# Patient Record
Sex: FEMALE | Race: WHITE | NOT HISPANIC OR LATINO | Employment: OTHER | URBAN - NONMETROPOLITAN AREA
[De-identification: names, ages, dates, MRNs, and addresses within clinical notes are randomized per-mention and may not be internally consistent; named-entity substitution may affect disease eponyms.]

---

## 2017-03-21 ENCOUNTER — OFFICE VISIT (OUTPATIENT)
Dept: CARDIOLOGY | Facility: CLINIC | Age: 62
End: 2017-03-21

## 2017-03-21 VITALS
BODY MASS INDEX: 33.97 KG/M2 | HEART RATE: 94 BPM | OXYGEN SATURATION: 97 % | HEIGHT: 62 IN | SYSTOLIC BLOOD PRESSURE: 142 MMHG | WEIGHT: 184.6 LBS | DIASTOLIC BLOOD PRESSURE: 91 MMHG

## 2017-03-21 DIAGNOSIS — R09.89 BILATERAL CAROTID BRUITS: ICD-10-CM

## 2017-03-21 DIAGNOSIS — I10 ESSENTIAL HYPERTENSION: ICD-10-CM

## 2017-03-21 DIAGNOSIS — R00.2 PALPITATIONS: ICD-10-CM

## 2017-03-21 DIAGNOSIS — I25.10 CAD IN NATIVE ARTERY: ICD-10-CM

## 2017-03-21 DIAGNOSIS — I95.1 ORTHOSTATIC HYPOTENSION: ICD-10-CM

## 2017-03-21 DIAGNOSIS — J44.9 CHRONIC OBSTRUCTIVE PULMONARY DISEASE, UNSPECIFIED COPD TYPE (HCC): ICD-10-CM

## 2017-03-21 DIAGNOSIS — I73.9 PERIPHERAL VASCULAR DISEASE (HCC): ICD-10-CM

## 2017-03-21 DIAGNOSIS — E78.5 DYSLIPIDEMIA: Primary | ICD-10-CM

## 2017-03-21 DIAGNOSIS — E78.00 HYPERCHOLESTEROLEMIA: ICD-10-CM

## 2017-03-21 DIAGNOSIS — Z00.00 HEALTHCARE MAINTENANCE: ICD-10-CM

## 2017-03-21 DIAGNOSIS — I73.9 CLAUDICATION (HCC): ICD-10-CM

## 2017-03-21 DIAGNOSIS — K21.9 GASTROESOPHAGEAL REFLUX DISEASE, ESOPHAGITIS PRESENCE NOT SPECIFIED: ICD-10-CM

## 2017-03-21 PROCEDURE — 99213 OFFICE O/P EST LOW 20 MIN: CPT | Performed by: NURSE PRACTITIONER

## 2017-03-21 RX ORDER — ROSUVASTATIN CALCIUM 40 MG/1
40 TABLET, COATED ORAL DAILY
Qty: 90 TABLET | Refills: 3 | Status: SHIPPED | OUTPATIENT
Start: 2017-03-21 | End: 2017-06-19 | Stop reason: SDUPTHER

## 2017-03-21 NOTE — PATIENT INSTRUCTIONS
Sinus Tachycardia  Sinus tachycardia is a kind of fast heartbeat. In sinus tachycardia, the heart beats more than 100 times a minute. Sinus tachycardia starts in a part of the heart called the sinus node.  Sinus tachycardia may be harmless, or it may be a sign of a serious condition.  CAUSES  This condition may be caused by:  · Exercise or exertion.  · A fever.  · Pain.  · An injury.  · Loss of body fluids (dehydration).  · Severe bleeding (hemorrhage).  · Anxiety and stress.  · Certain substances, including:    Alcohol.    Caffeine.    Tobacco products.    Diet pills.    Illegal drugs.  · Medical conditions including:    Heart disease.    An infection.    An overactive thyroid (hyperthyroidism).    A lack of red blood cells (anemia).  SYMPTOMS  Symptoms of this condition include:  · A feeling that the heart is beating quickly (palpitation).  · Suddenly noticing your heartbeat (cardiac awareness).  · Dizziness.  · Tiredness (fatigue).  · Shortness of breath.  · Chest pain.  · Nausea.  · Fainting.  DIAGNOSIS  This condition is diagnosed with a physical exam and tests, such as:  · Blood tests.  · An electrocardiogram (ECG). This test measures the electrical activity of the heart.  · Holter monitoring. For this test, you wear a device that records your heartbeat for one or more days.  You may be referred to a heart specialist (cardiologist).  TREATMENT  Treatment for this condition depends on the cause or underlying condition. Treatment may involve:  · Treating the underlying condition.  · Taking new medicines or changing your current medicines as told by your health care provider.  · Making changes to your diet or lifestyle.  · Practicing relaxation methods.  HOME CARE INSTRUCTIONS  · Rest.  · Drink enough fluids to keep your urine clear or pale yellow.  · Do not use any tobacco products, such as cigarettes, chewing tobacco, and e-cigarettes. If you need help quitting, ask your health care provider.  · Do not use  illegal drugs, such as cocaine.  · Avoid:    Caffeine.    Alcohol.    Stimulants such as over-the-counter diet pills or pills that help you to stay awake.    Situations that cause anxiety or stress.  · Take over-the-counter and prescription medicines only as told by your health care provider.  · Keep all follow-up visits as told by your health care provider. This is important.  · Learn relaxation methods, like deep breathing, to help you when you get stressed or anxious.  SEEK MEDICAL CARE IF:  · You have a fever.  · You have vomiting or diarrhea that keeps happening (is persistent).  SEEK IMMEDIATE MEDICAL CARE IF:  · You have pain in your chest, upper arms, jaw, or neck.  · You become weak or dizzy.  · You feel faint.  · You have palpitations that do not go away.     This information is not intended to replace advice given to you by your health care provider. Make sure you discuss any questions you have with your health care provider.     Document Released: 01/25/2006 Document Revised: 01/13/2017 Document Reviewed: 07/01/2016  [employer] Interactive Patient Education ©2016 [employer]  Peripheral Vascular Disease  Peripheral vascular disease (PVD) is a disease of the blood vessels that are not part of your heart and brain. A simple term for PVD is poor circulation. In most cases, PVD narrows the blood vessels that carry blood from your heart to the rest of your body. This can result in a decreased supply of blood to your arms, legs, and internal organs, like your stomach or kidneys. However, it most often affects a person's lower legs and feet.  There are two types of PVD.  · Organic PVD. This is the more common type. It is caused by damage to the structure of blood vessels.  · Functional PVD. This is caused by conditions that make blood vessels contract and tighten (spasm).  Without treatment, PVD tends to get worse over time.  PVD can also lead to acute ischemic limb. This is when an arm or limb suddenly has  trouble getting enough blood. This is a medical emergency.  CAUSES  Each type of PVD has many different causes. The most common cause of PVD is buildup of a fatty material (plaque) inside of your arteries (atherosclerosis). Small amounts of plaque can break off from the walls of the blood vessels and become lodged in a smaller artery. This blocks blood flow and can cause acute ischemic limb.  Other common causes of PVD include:  · Blood clots that form inside of blood vessels.  · Injuries to blood vessels.  · Diseases that cause inflammation of blood vessels or cause blood vessel spasms.  · Health behaviors and health history that increase your risk of developing PVD.  RISK FACTORS   You may have a greater risk of PVD if you:  · Have a family history of PVD.  · Have certain medical conditions, including:    High cholesterol.    Diabetes.    High blood pressure (hypertension).    Coronary heart disease.    Past problems with blood clots.    Past injury, such as burns or a broken bone. These may have damaged blood vessels in your limbs.    Buerger disease. This is caused by inflamed blood vessels in your hands and feet.    Some forms of arthritis.    Rare birth defects that affect the arteries in your legs.  · Use tobacco.  · Do not get enough exercise.  · Are obese.  · Are age 50 or older.  SIGNS AND SYMPTOMS   PVD may cause many different symptoms. Your symptoms depend on what part of your body is not getting enough blood. Some common signs and symptoms include:  · Cramps in your lower legs. This may be a symptom of poor leg circulation (claudication).  · Pain and weakness in your legs while you are physically active that goes away when you rest (intermittent claudication).  · Leg pain when at rest.  · Leg numbness, tingling, or weakness.  · Coldness in a leg or foot, especially when compared with the other leg.  · Skin or hair changes. These can include:    Hair loss.    Shiny skin.    Pale or bluish skin.    Thick  toenails.  · Inability to get or maintain an erection (erectile dysfunction).  People with PVD are more prone to developing ulcers and sores on their toes, feet, or legs. These may take longer than normal to heal.  DIAGNOSIS  Your health care provider may diagnose PVD from your signs and symptoms. The health care provider will also do a physical exam. You may have tests to find out what is causing your PVD and determine its severity. Tests may include:  · Blood pressure recordings from your arms and legs and measurements of the strength of your pulses (pulse volume recordings).  · Imaging studies using sound waves to take pictures of the blood flow through your blood vessels (Doppler ultrasound).  · Injecting a dye into your blood vessels before having imaging studies using:    X-rays (angiogram or arteriogram).    Computer-generated X-rays (CT angiogram).    A powerful electromagnetic field and a computer (magnetic resonance angiogram or MRA).  TREATMENT  Treatment for PVD depends on the cause of your condition and the severity of your symptoms. It also depends on your age. Underlying causes need to be treated and controlled. These include long-lasting (chronic) conditions, such as diabetes, high cholesterol, and high blood pressure. You may need to first try making lifestyle changes and taking medicines. Surgery may be needed if these do not work.  Lifestyle changes may include:  · Quitting smoking.  · Exercising regularly.  · Following a low-fat, low-cholesterol diet.  Medicines may include:  · Blood thinners to prevent blood clots.  · Medicines to improve blood flow.  · Medicines to improve your blood cholesterol levels.  Surgical procedures may include:  · A procedure that uses an inflated balloon to open a blocked artery and improve blood flow (angioplasty).  · A procedure to put in a tube (stent) to keep a blocked artery open (stent implant).  · Surgery to reroute blood flow around a blocked artery  (peripheral bypass surgery).  · Surgery to remove dead tissue from an infected wound on the affected limb.  · Amputation. This is surgical removal of the affected limb. This may be necessary in cases of acute ischemic limb that are not improved through medical or surgical treatments.  HOME CARE INSTRUCTIONS  · Take medicines only as directed by your health care provider.  · Do not use any tobacco products, including cigarettes, chewing tobacco, or electronic cigarettes.  If you need help quitting, ask your health care provider.  · Lose weight if you are overweight, and maintain a healthy weight as directed by your health care provider.  · Eat a diet that is low in fat and cholesterol. If you need help, ask your health care provider.  · Exercise regularly. Ask your health care provider to suggest some good activities for you.  · Use compression stockings or other mechanical devices as directed by your health care provider.  · Take good care of your feet.    Wear comfortable shoes that fit well.    Check your feet often for any cuts or sores.  SEEK MEDICAL CARE IF:  · You have cramps in your legs while walking.  · You have leg pain when you are at rest.  · You have coldness in a leg or foot.  · Your skin changes.  · You have erectile dysfunction.  · You have cuts or sores on your feet that are not healing.  SEEK IMMEDIATE MEDICAL CARE IF:  · Your arm or leg turns cold and blue.  · Your arms or legs become red, warm, swollen, painful, or numb.  · You have chest pain or trouble breathing.  · You suddenly have weakness in your face, arm, or leg.  · You become very confused or lose the ability to speak.  · You suddenly have a very bad headache or lose your vision.     This information is not intended to replace advice given to you by your health care provider. Make sure you discuss any questions you have with your health care provider.     Document Released: 01/25/2006 Document Revised: 01/08/2016 Document Reviewed:  05/28/2015  [employer] Interactive Patient Education ©2016 [employer]

## 2017-03-21 NOTE — PROGRESS NOTES
Subjective   Barby Sue is a 61 y.o. female     Chief Complaint   Patient presents with   • Follow-up     presents as a follow up       HPI    Problem List:  1. Coronary artery disease  1.1 Nonobstructive per catheterization, March 2012.  1.2 Follow up stress August 2015 demonstrated chest wall attenuation but no sign of ischemia, preserved ER  1.3 Stress Test 12/14/16 - no ischemia; low risk  2. PVD with history of aortobifemoral bypass 2014  2.1 BLE Arterial U/S 12/14/16 - < or equal to 50% right common femoral artery; high-grade stenosis left posterior tibial artery 50-75% stenosis  3. Chronic hypotension   4. Hyperlipidemia   5. Carotid Artery Disease   5.1 Carotid Artery U/S 12/14/16 - 50-75% right external carotid; 16-49% KENNEDY and LICA; antegrade flow both vertebral arteries   6. Echo 12/14/16 - mild LVH; EF > 65%; DD I; trace MR; physiological TR; mild AK    Patient is a 61-year-old female who presents today for a follow-up.  She denies any chest pain or pressure.  She notices her heart racing and palpitations a lot. She had some dizziness when she had a sinus infection that went into bronchitis.  She denies any presyncope, syncope, orthopnea or PND.  She will get edema in the evening as she will notice sock marks.  She says she has pain in her legs when she is on them.  She says after having her bypass it got better, but then it has come back.  She quit smoking at that time and has not picked it back up.  She use to smoke 1 1/2 - 2 PPD for 40 yrs. She has to stop and rest with any activity that she does.  She is currently using a nebulizer due to her recent bronchitis.     We went over stress, echo, carotid and BLE arterial U/S.     Current Outpatient Prescriptions   Medication Sig Dispense Refill   • aspirin 81 MG EC tablet Take 1 tablet by mouth daily.     • Biotin 1000 MCG tablet Take 1 tablet by mouth daily.     • BLACK COHOSH PO Take 1,000 mg by mouth daily.     • budesonide-formoterol (SYMBICORT)  160-4.5 MCG/ACT inhaler Inhale 2 puffs 2 (two) times a day. Rinse mouth after use.     • CloNIDine (CATAPRES) 0.1 MG tablet Take 1 tablet by mouth 2 (Two) Times a Day. 180 tablet 3   • cyclobenzaprine (FLEXERIL) 10 MG tablet Take 1 tablet by mouth 3 (three) times a day.     • dextromethorphan-guaifenesin (ROBITUSSIN-DM)  MG/5ML syrup Take 5 mL by mouth. Take every 4-6 hours as needed.     • fludrocortisone 0.1 MG tablet Take 1 tablet by mouth 3 (Three) Times a Week. Take on Monday, Wednesday, and Friday. 90 tablet 3   • furosemide (LASIX) 20 MG tablet Take 1 tablet by mouth Daily. 90 tablet 3   • gabapentin (NEURONTIN) 600 MG tablet Take 300 mg by mouth 3 (three) times a day.     • isosorbide mononitrate (IMDUR) 30 MG 24 hr tablet Take 1 tablet by mouth Daily. 90 tablet 3   • lisinopril (PRINIVIL,ZESTRIL) 40 MG tablet Take 1 tablet by mouth Daily. 90 tablet 3   • metoprolol tartrate (LOPRESSOR) 25 MG tablet Take 0.5 tablets by mouth 2 (Two) Times a Day. 90 tablet 3   • midodrine (PROAMATINE) 2.5 MG tablet Take 1 tablet by mouth 3 (Three) Times a Day. 270 tablet 3   • nitroglycerin (NITROSTAT) 0.4 MG SL tablet Place 1 tablet under the tongue Every 5 (Five) Minutes As Needed for chest pain. Only take up to 3 tablets. Call 911 if pain persists. 25 tablet 5   • oxyCODONE-acetaminophen (PERCOCET)  MG per tablet Take 1 tablet by mouth every 8 (eight) hours as needed for moderate pain (4-6).     • pantoprazole (PROTONIX) 40 MG EC tablet Take 40 mg by mouth daily.     • potassium chloride (MICRO-K) 10 MEQ CR capsule Take 1 capsule by mouth Daily. 90 capsule 3   • rosuvastatin (CRESTOR) 40 MG tablet Take 1 tablet by mouth Daily. 90 tablet 3     No current facility-administered medications for this visit.        ALLERGIES    Adhesive tape; Ibuprofen; and Latex    Past Medical History   Diagnosis Date   • Anxiety    • Back pain    • Carotid bruit    • Chest pain    • COPD (chronic obstructive pulmonary disease)     • Dyslipidemia    • Fainting    • Functional murmur    • GERD (gastroesophageal reflux disease)    • Hypercholesterolemia    • Hyperlipidemia    • Hypertension    • Hypokalemia    • Orthostatic hypotension    • Orthostatic hypotension    • Peripheral vascular disease    • Peripheral vascular disease      PVD with history of aortobifemoral bypass   • Shortness of breath    • Sleep apnea        Social History     Social History   • Marital status:      Spouse name: N/A   • Number of children: N/A   • Years of education: N/A     Occupational History   • Not on file.     Social History Main Topics   • Smoking status: Former Smoker   • Smokeless tobacco: Never Used   • Alcohol use No   • Drug use: No   • Sexual activity: Not on file     Other Topics Concern   • Not on file     Social History Narrative       Family History   Problem Relation Age of Onset   • Heart failure Mother      Congestive   • Hypertension Mother    • Heart failure Sister      Congestive   • Coronary artery disease Sister    • Heart attack Sister      Acute   • Hypertension Sister    • Sudden death Sister    • Coronary artery disease Brother    • Seizures Brother    • Hypertension Brother    • Heart failure Maternal Grandmother      Congestive       Review of Systems   Constitutional: Positive for fatigue. Negative for diaphoresis.   HENT: Positive for congestion, ear pain, postnasal drip and sinus pressure.         Sinus infection then bronchitis; PCP wanted to admit her but patient didn't want to go, put her on nebs instead    Eyes: Positive for visual disturbance (wears glasses PRN ).   Respiratory: Positive for shortness of breath (she has to stop and rest with activity, she is wore out has to rest ). Negative for chest tightness.    Cardiovascular: Positive for palpitations (notices it a lot) and leg swelling (every day when she takes shoes and socks off ). Negative for chest pain.   Gastrointestinal: Positive for constipation,  "diarrhea, nausea (since she has been sick ) and vomiting (since she has been sick ).   Endocrine: Negative.    Genitourinary: Positive for difficulty urinating (hard started to g o) and frequency.   Musculoskeletal: Positive for arthralgias, back pain and neck pain.   Skin: Negative.    Allergic/Immunologic: Positive for environmental allergies.   Neurological: Positive for dizziness (some dizziness while she has been sick ). Negative for syncope and light-headedness.   Hematological: Negative.    Psychiatric/Behavioral: The patient is nervous/anxious.        Objective   Visit Vitals   • /91 (BP Location: Left arm, Patient Position: Sitting)   • Pulse 94   • Ht 61.6\" (156.5 cm)   • Wt 184 lb 9.6 oz (83.7 kg)   • SpO2 97%   • BMI 34.2 kg/m2     Lab Results (most recent)     None        Physical Exam   Constitutional: She is oriented to person, place, and time. Vital signs are normal. She appears well-developed and well-nourished. She is active and cooperative.   HENT:   Head: Normocephalic.   Mouth/Throat: She has dentures (upper and lower ).   Eyes: Lids are normal.   Glasses PRN    Neck: Normal carotid pulses, no hepatojugular reflux and no JVD present. Carotid bruit is present (right ).   Cardiovascular: Normal rate, regular rhythm and normal heart sounds.    Pulses:       Radial pulses are 2+ on the right side, and 2+ on the left side.        Dorsalis pedis pulses are 2+ on the right side, and 2+ on the left side.        Posterior tibial pulses are 2+ on the right side, and 2+ on the left side.   1+ edema in RLE; no edema LLE.    Pulmonary/Chest: Effort normal and breath sounds normal.   Abdominal: Normal appearance.   Neurological: She is alert and oriented to person, place, and time.   Skin: Skin is warm, dry and intact. Bruising noted.   Psychiatric: She has a normal mood and affect. Her speech is normal and behavior is normal. Judgment and thought content normal. Cognition and memory are normal. "       Procedure   Procedures         Assessment/Plan      Diagnosis Plan   1. Dyslipidemia  rosuvastatin (CRESTOR) 40 MG tablet    Comprehensive Metabolic Panel    Comprehensive Metabolic Panel   2. Peripheral vascular disease  Lipid Panel    CT angio abdominal aorta bilat iliofem runoff w wo contrast    Lipid Panel    CT angio abdominal aorta bilat iliofem runoff w wo contrast   3. Orthostatic hypotension     4. Hypercholesterolemia  Lipid Panel    Lipid Panel   5. Bilateral carotid bruits     6. CAD in native artery  Comprehensive Metabolic Panel    Lipid Panel    Comprehensive Metabolic Panel    Lipid Panel   7. Essential hypertension     8. Chronic obstructive pulmonary disease, unspecified COPD type     9. Gastroesophageal reflux disease, esophagitis presence not specified     10. Healthcare maintenance  Comprehensive Metabolic Panel    TSH    Lipid Panel    Comprehensive Metabolic Panel    TSH    Lipid Panel   11. Palpitations  TSH    Cardiac Event Monitor    TSH    Cardiac Event Monitor   12. Claudication  CT angio abdominal aorta bilat iliofem runoff w wo contrast    CT angio abdominal aorta bilat iliofem runoff w wo contrast       Return in about 6 weeks (around 5/2/2017).    Patient will get a CTA with run off.  She will wear an event monitor for 2 weeks.  She will get lipids, CMP and TSH.  She will continue her medication regimen.  She will follow-up in 6 weeks or sooner if any changes.

## 2017-04-11 ENCOUNTER — OUTSIDE FACILITY SERVICE (OUTPATIENT)
Dept: CARDIOLOGY | Facility: CLINIC | Age: 62
End: 2017-04-11

## 2017-04-11 PROCEDURE — 93228 REMOTE 30 DAY ECG REV/REPORT: CPT | Performed by: INTERNAL MEDICINE

## 2017-05-25 ENCOUNTER — TELEPHONE (OUTPATIENT)
Dept: CARDIOLOGY | Facility: CLINIC | Age: 62
End: 2017-05-25

## 2017-06-19 ENCOUNTER — OFFICE VISIT (OUTPATIENT)
Dept: CARDIOLOGY | Facility: CLINIC | Age: 62
End: 2017-06-19

## 2017-06-19 VITALS
WEIGHT: 182.2 LBS | HEIGHT: 61 IN | HEART RATE: 98 BPM | OXYGEN SATURATION: 95 % | BODY MASS INDEX: 34.4 KG/M2 | SYSTOLIC BLOOD PRESSURE: 161 MMHG | DIASTOLIC BLOOD PRESSURE: 97 MMHG

## 2017-06-19 DIAGNOSIS — E78.5 DYSLIPIDEMIA: ICD-10-CM

## 2017-06-19 DIAGNOSIS — I25.10 CAD IN NATIVE ARTERY: ICD-10-CM

## 2017-06-19 DIAGNOSIS — I95.1 ORTHOSTATIC HYPOTENSION: ICD-10-CM

## 2017-06-19 DIAGNOSIS — E78.00 ELEVATED CHOLESTEROL: ICD-10-CM

## 2017-06-19 DIAGNOSIS — R06.02 SOB (SHORTNESS OF BREATH) ON EXERTION: ICD-10-CM

## 2017-06-19 DIAGNOSIS — R00.2 PALPITATIONS: Primary | ICD-10-CM

## 2017-06-19 DIAGNOSIS — I73.9 PERIPHERAL VASCULAR DISEASE (HCC): ICD-10-CM

## 2017-06-19 DIAGNOSIS — I10 ESSENTIAL HYPERTENSION: ICD-10-CM

## 2017-06-19 PROCEDURE — 93000 ELECTROCARDIOGRAM COMPLETE: CPT | Performed by: NURSE PRACTITIONER

## 2017-06-19 PROCEDURE — 99214 OFFICE O/P EST MOD 30 MIN: CPT | Performed by: NURSE PRACTITIONER

## 2017-06-19 RX ORDER — ROSUVASTATIN CALCIUM 40 MG/1
40 TABLET, COATED ORAL DAILY
Qty: 90 TABLET | Refills: 3 | Status: SHIPPED | OUTPATIENT
Start: 2017-06-19 | End: 2017-12-19 | Stop reason: SDUPTHER

## 2017-06-19 RX ORDER — ERGOCALCIFEROL 1.25 MG/1
CAPSULE ORAL WEEKLY
COMMUNITY
Start: 2017-06-12 | End: 2022-06-13

## 2017-06-19 NOTE — PATIENT INSTRUCTIONS
Palpitations  A palpitation is the feeling that your heartbeat is irregular or is faster than normal. It may feel like your heart is fluttering or skipping a beat. Palpitations are usually not a serious problem. They may be caused by many things, including smoking, caffeine, alcohol, stress, and certain medicines. Although most causes of palpitations are not serious, palpitations can be a sign of a serious medical problem. In some cases, you may need further medical evaluation.  HOME CARE INSTRUCTIONS  Pay attention to any changes in your symptoms. Take these actions to help with your condition:  · Avoid the following:    Caffeinated coffee, tea, soft drinks, diet pills, and energy drinks.    Chocolate.    Alcohol.  · Do not use any tobacco products, such as cigarettes, chewing tobacco, and e-cigarettes. If you need help quitting, ask your health care provider.  · Try to reduce your stress and anxiety. Things that can help you relax include:    Yoga.    Meditation.    Physical activity, such as swimming, jogging, or walking.    Biofeedback. This is a method that helps you learn to use your mind to control things in your body, such as your heartbeats.  · Get plenty of rest and sleep.  · Take over-the-counter and prescription medicines only as told by your health care provider.  · Keep all follow-up visits as told by your health care provider. This is important.  SEEK MEDICAL CARE IF:  · You continue to have a fast or irregular heartbeat after 24 hours.  · Your palpitations occur more often.  SEEK IMMEDIATE MEDICAL CARE IF:  · You have chest pain or shortness of breath.  · You have a severe headache.  · You feel dizzy or you faint.     This information is not intended to replace advice given to you by your health care provider. Make sure you discuss any questions you have with your health care provider.     Document Released: 12/15/2001 Document Revised: 04/10/2017 Document Reviewed: 09/01/2016  [employer]  Patient Education ©2017 [employer]  Coronary Artery Disease, Female  Coronary artery disease (CAD) is a process in which the blood vessels of the heart (coronary arteries) become narrow or blocked. The narrowing or blockage can lead to decreased blood flow to the heart muscle (angina). Symptoms known as angina can develop if the blood flow is reduced to the heart for a short period of time. Prolonged reduced blood flow can cause a heart attack (myocardial infarction, MI).  CAD is a leading cause of death for women. More women die from CAD than from cancer, lung disease, and accidents combined. It is important for women to understand the risks, symptoms, and treatment options for CAD.  CAUSES  Atherosclerosis is the cause of CAD. Atherosclerosis is the buildup of fat and cholesterol (plaque) on the inside of the arteries. Over time, the plaque may narrow or block the artery, and this will lessen blood flow to the heart. Plaque can also become weak and break off within a coronary artery to form a clot and cause a sudden blockage.  RISK FACTORS  Many risk factors increase your chances of getting CAD, including:  · High cholesterol levels.  · High blood pressure (hypertension).  · Tobacco use.  · Diabetes.  · Age. Women over age 55 are at a greater risk of CAD.  · Menopause.    All postmenopausal women are at greater risk of CAD.    Women who have experienced menopause between the ages of 40-45 (early menopause) are at a higher risk of CAD.    Women who have experienced menopause before age 40 (premature menopause) are at an extremely high risk of CAD.  · Family history of CAD.  · Obesity.  · Lack of exercise.  · A diet high in saturated fats.  SYMPTOMS   Many people do not experience any symptoms during the early stages of CAD. As the condition progresses, symptoms may include:  · Chest pain.    The pain can be described as crushing or squeezing, or a tightness, pressure, fullness, or heaviness in the chest.    The  pain can last more than a few minutes or can stop and recur.  · Pain in the arms, neck, jaw, or back.  · Unexplained heartburn or indigestion.  · Shortness of breath.  · Nausea.  · Sudden cold sweats.  · Sudden light-headedness.  Many women have chest discomfort and the other symptoms. However, women often have different (atypical) symptoms, such as:  · Fatigue.  · Unexplained feelings of nervousness or anxiety.  · Unexplained weakness.  · Dizziness or fainting.  Sometimes, women may not have any symptoms of CAD.  DIAGNOSIS   Tests to diagnose CAD may include:  · ECG (electrocardiogram).  · Exercise stress test. This looks for signs of blockage when the heart is being exercised.  · Pharmacologic stress test. This test looks for signs of blockage when the heart is being stressed with a medicine.  · Blood tests.  · Coronary angiogram. This is a procedure to look at the coronary arteries to see if there is any blockage.  TREATMENT  The treatment of CAD may include the following:  · Healthy behavioral changes to reduce or control risk factors.  · Medicine.  · Coronary stenting. A stent helps to keep an artery open.  · Coronary angioplasty. This procedure widens a narrowed or blocked artery.  · Coronary artery bypass surgery. This will allow your blood to pass the blockage (bypass) to reach your heart.  HOME CARE INSTRUCTIONS  · Take medicines only as directed by your health care provider.  · Do not take the following medicines unless your health care provider approves:    Nonsteroidal anti-inflammatory drugs (NSAIDs), such as ibuprofen, naproxen, or celecoxib.    Vitamin supplements that contain vitamin A, vitamin E, or both.    Hormone replacement therapy that contains estrogen with or without progestin.  · Manage other health conditions such as hypertension and diabetes as directed by your health care provider.  · Follow a heart-healthy diet. A dietitian can help to educate you about healthy food options and  changes.  · Use healthy cooking methods such as roasting, grilling, broiling, baking, poaching, steaming, or stir-frying. Talk to a dietitian to learn more about healthy cooking methods.  · Follow an exercise program approved by your health care provider.  · Maintain a healthy weight. Lose weight as approved by your health care provider.  · Plan rest periods when fatigued.  · Learn to manage stress.  · Do not use any tobacco products, including cigarettes, chewing tobacco, or electronic cigarettes. If you need help quitting, ask your health care provider.  · If you drink alcohol, and your health care provider approves, limit your alcohol intake to no more than 1 drink per day. One drink equals 12 ounces of beer, 5 ounces of wine, or 1½ ounces of hard liquor.  · Stop illegal drug use.  · Your health care provider may ask you to monitor your blood pressure. A blood pressure reading consists of a higher number over a lower number, such as 110 over 72, which is written as 110/72. Ideally, your blood pressure should be:    Below 140/90 if you have no other medical conditions.    Below 130/80 if you have diabetes or kidney disease.  · Keep all follow-up visits as directed by your health care provider. This is important.  SEEK IMMEDIATE MEDICAL CARE IF:  · You have pain in your chest, neck, arm, jaw, stomach, or back that lasts more than a few minutes, is recurring, or is unrelieved by taking medicine under your tongue (sublingual nitroglycerin).  · You have profuse sweating without cause.  · You have unexplained:    Heartburn or indigestion.    Shortness of breath or difficulty breathing.    Nausea or vomiting.    Fatigue.    Feelings of nervousness or anxiety.    Weakness.    Diarrhea.  · You have sudden light-headedness or dizziness.  · You faint.  These symptoms may represent a serious problem that is an emergency. Do not wait to see if the symptoms will go away. Get medical help right away. Call your local emergency  services (911 in the [place] Do not drive yourself to the hospital.     This information is not intended to replace advice given to you by your health care provider. Make sure you discuss any questions you have with your health care provider.     Document Released: 03/11/2013 Document Revised: 01/08/2016 Document Reviewed: 04/21/2015  [employer] Interactive Patient Education ©2017 [employer] Inc.

## 2017-06-19 NOTE — PROGRESS NOTES
Subjective   Barby Sue is a 62 y.o. female     Chief Complaint   Patient presents with   • Follow-up     patient appears in office today for follow up with CTA results       HPI    Problem List:  1. Coronary artery disease  1.1 Nonobstructive per catheterization, March 2012.  1.2 Follow up stress August 2015 demonstrated chest wall attenuation but no sign of ischemia, preserved ER  1.3 Stress Test 12/14/16 - no ischemia; low risk  2. PVD with history of aortobifemoral bypass 2014  2.1 BLE Arterial U/S 12/14/16 - < or equal to 50% right common femoral artery; high-grade stenosis left posterior tibial artery 50-75% stenosis  2.2 CTA Abd with runoff 5/16/17 - patent aorta bi-iliac graft, mild narrowing seen at prox aspect of the celiac trunk.  No high grade stenosis. Fatty infiltration of the liver.   3. Chronic hypotension   4. Hyperlipidemia   5. Carotid Artery Disease   5.1 Carotid Artery U/S 12/14/16 - 50-75% right external carotid; 16-49% KENNEDY and LICA; antegrade flow both vertebral arteries   6. Echo 12/14/16 - mild LVH; EF > 65%; DD I; trace MR; physiological TR; mild ID  7. COPD  8. Palpitations  8.1 Event Monitor 3/31-4/11/17 - [facility] - ST    Patient is a 62-year-old female who presents today for a follow-up on testing.  She denies any chest pain, pressure, palpitations, fluttering, dizziness, presyncope, syncope, orthopnea or PND.  She will get BLE edema.  She also will get short of breath with increased activity as she was recently treated for bronchitis.      Current Outpatient Prescriptions   Medication Sig Dispense Refill   • aspirin 81 MG EC tablet Take 1 tablet by mouth daily.     • Biotin 1000 MCG tablet Take 1 tablet by mouth daily.     • BLACK COHOSH PO Take 1,000 mg by mouth daily.     • budesonide-formoterol (SYMBICORT) 160-4.5 MCG/ACT inhaler Inhale 2 puffs 2 (two) times a day. Rinse mouth after use.     • CloNIDine (CATAPRES) 0.1 MG tablet Take 1 tablet by mouth 2 (Two) Times a Day. 180 tablet 3    • cyclobenzaprine (FLEXERIL) 10 MG tablet Take 1 tablet by mouth 3 (three) times a day.     • fludrocortisone 0.1 MG tablet Take 1 tablet by mouth 3 (Three) Times a Week. Take on Monday, Wednesday, and Friday. 90 tablet 3   • furosemide (LASIX) 20 MG tablet Take 1 tablet by mouth Daily. 90 tablet 3   • gabapentin (NEURONTIN) 600 MG tablet Take 300 mg by mouth 3 (three) times a day.     • isosorbide mononitrate (IMDUR) 30 MG 24 hr tablet Take 1 tablet by mouth Daily. 90 tablet 3   • lisinopril (PRINIVIL,ZESTRIL) 40 MG tablet Take 1 tablet by mouth Daily. 90 tablet 3   • metoprolol tartrate (LOPRESSOR) 25 MG tablet Take 0.5 tablets by mouth 2 (Two) Times a Day. 90 tablet 3   • midodrine (PROAMATINE) 2.5 MG tablet Take 1 tablet by mouth 3 (Three) Times a Day. 270 tablet 3   • nitroglycerin (NITROSTAT) 0.4 MG SL tablet Place 1 tablet under the tongue Every 5 (Five) Minutes As Needed for chest pain. Only take up to 3 tablets. Call 911 if pain persists. 25 tablet 5   • oxyCODONE-acetaminophen (PERCOCET)  MG per tablet Take 1 tablet by mouth every 8 (eight) hours as needed for moderate pain (4-6).     • pantoprazole (PROTONIX) 40 MG EC tablet Take 40 mg by mouth daily.     • potassium chloride (MICRO-K) 10 MEQ CR capsule Take 1 capsule by mouth Daily. 90 capsule 3   • rosuvastatin (CRESTOR) 40 MG tablet Take 1 tablet by mouth Daily. 90 tablet 3   • vitamin D (ERGOCALCIFEROL) 88514 UNITS capsule capsule 1 (One) Time Per Week.     • dextromethorphan-guaifenesin (ROBITUSSIN-DM)  MG/5ML syrup Take 5 mL by mouth. Take every 4-6 hours as needed.       No current facility-administered medications for this visit.        ALLERGIES    Adhesive tape; Ibuprofen; and Latex    Past Medical History:   Diagnosis Date   • Anxiety    • Back pain    • Carotid bruit    • Chest pain    • COPD (chronic obstructive pulmonary disease)    • Dyslipidemia    • Fainting    • Functional murmur    • GERD (gastroesophageal reflux disease)     • Hypercholesterolemia    • Hyperlipidemia    • Hypertension    • Hypokalemia    • Orthostatic hypotension    • Orthostatic hypotension    • Peripheral vascular disease    • Peripheral vascular disease     PVD with history of aortobifemoral bypass   • Shortness of breath    • Sleep apnea        Social History     Social History   • Marital status:      Spouse name: N/A   • Number of children: N/A   • Years of education: N/A     Occupational History   • Not on file.     Social History Main Topics   • Smoking status: Former Smoker   • Smokeless tobacco: Never Used   • Alcohol use No   • Drug use: No   • Sexual activity: Defer     Other Topics Concern   • Not on file     Social History Narrative       Family History   Problem Relation Age of Onset   • Heart failure Mother      Congestive   • Hypertension Mother    • Heart failure Sister      Congestive   • Coronary artery disease Sister    • Heart attack Sister      Acute   • Hypertension Sister    • Sudden death Sister    • Coronary artery disease Brother    • Seizures Brother    • Hypertension Brother    • Heart failure Maternal Grandmother      Congestive       Review of Systems   Constitutional: Positive for fatigue (increase in fatigue). Negative for diaphoresis.   HENT: Positive for congestion (head/nasal congestion), ear pain (B ear pain ) and sneezing (due to allergies). Negative for rhinorrhea.    Eyes: Positive for visual disturbance (glasses PRN ).   Respiratory: Positive for cough (pt recentyl dx with bronchitis), chest tightness (pt recentyl dx with bronchitis), shortness of breath ( pt recentyl dx with bronchitis) and wheezing ( pt recentyl dx with bronchitis).         Recent dx of bronchitis   Cardiovascular: Positive for leg swelling (BLE feet/legs swellings). Negative for chest pain and palpitations.   Gastrointestinal: Negative for abdominal pain and vomiting.   Endocrine: Negative for cold intolerance, heat intolerance and polyuria.  "  Genitourinary: Positive for difficulty urinating (feels frequency to urinate; but cannot void once she goes to bathroom ) and frequency (increase in urinary frequency). Negative for urgency.   Musculoskeletal: Positive for arthralgias (knees/hands/elbow/back) and back pain (due to arthritis). Negative for joint swelling, myalgias, neck pain and neck stiffness.   Skin: Negative for rash and wound.   Allergic/Immunologic: Positive for environmental allergies. Negative for food allergies.   Neurological: Negative for dizziness, weakness, light-headedness and headaches.   Hematological: Bruises/bleeds easily (pt states she bruises and bleeds easily).   Psychiatric/Behavioral: Positive for agitation (pt states she gets aggiatated easily). Negative for confusion and sleep disturbance. The patient is not nervous/anxious.        Objective   /97 (BP Location: Left arm, Patient Position: Sitting)  Pulse 98  Ht 61\" (154.9 cm)  Wt 182 lb 3.2 oz (82.6 kg)  SpO2 95%  BMI 34.43 kg/m2  Lab Results (most recent)     None        Physical Exam   Constitutional: She is oriented to person, place, and time. Vital signs are normal. She appears well-developed and well-nourished. She is active and cooperative.   HENT:   Head: Normocephalic.   Mouth/Throat: She has dentures (upper and lower ).   Eyes: Lids are normal.   Glasses PRN    Neck: Normal carotid pulses, no hepatojugular reflux and no JVD present. Carotid bruit is present (right ).   Cardiovascular: Normal rate, regular rhythm and normal heart sounds.    Pulses:       Radial pulses are 2+ on the right side, and 2+ on the left side.        Dorsalis pedis pulses are 2+ on the right side, and 2+ on the left side.        Posterior tibial pulses are 2+ on the right side, and 2+ on the left side.   Abdominal: Normal appearance and bowel sounds are normal.   Neurological: She is alert and oriented to person, place, and time.   Skin: Skin is warm, dry and intact. Bruising " noted.   Psychiatric: She has a normal mood and affect. Her speech is normal and behavior is normal. Judgment and thought content normal. Cognition and memory are normal.       Procedure     ECG 12 Lead  Date/Time: 6/19/2017 3:04 PM  Performed by: JOVANNY MOYA  Authorized by: JOVANNY MOYA   Rhythm: sinus rhythm  Rate: normal  BPM: 93  QRS axis: normal  Clinical impression: non-specific ECG  Comments: Palpitations  SOB                 Assessment/Plan      Diagnosis Plan   1. Palpitations  ECG 12 Lead   2. SOB (shortness of breath) on exertion  ECG 12 Lead   3. Peripheral vascular disease     4. Elevated cholesterol     5. CAD in native artery     6. Essential hypertension     7. Orthostatic hypotension     8. Dyslipidemia  rosuvastatin (CRESTOR) 40 MG tablet       Return in about 6 months (around 12/19/2017).    Palpitations - doing well.  Shortness of breath - recently treated for bronchitis.  PVD - doing well, on ASA and statin.  CAD - on ASA, Statin and beta.  Dyslipidemia - on statin.  Orthostatic Hypotension - on Florinef and midodrine, adjusts medication as she needs to based on BP.  Patient is doing very well from a cardiac standpoint.  She will continue her medication regimen.  She will follow-up in 6 mos or sooner if any changes.

## 2017-12-19 ENCOUNTER — OFFICE VISIT (OUTPATIENT)
Dept: CARDIOLOGY | Facility: CLINIC | Age: 62
End: 2017-12-19

## 2017-12-19 VITALS
DIASTOLIC BLOOD PRESSURE: 97 MMHG | SYSTOLIC BLOOD PRESSURE: 150 MMHG | OXYGEN SATURATION: 94 % | HEART RATE: 98 BPM | WEIGHT: 182.8 LBS | BODY MASS INDEX: 34.51 KG/M2 | HEIGHT: 61 IN

## 2017-12-19 DIAGNOSIS — E78.5 DYSLIPIDEMIA: ICD-10-CM

## 2017-12-19 DIAGNOSIS — I10 ESSENTIAL HYPERTENSION: Primary | ICD-10-CM

## 2017-12-19 DIAGNOSIS — J44.9 CHRONIC OBSTRUCTIVE PULMONARY DISEASE, UNSPECIFIED COPD TYPE (HCC): ICD-10-CM

## 2017-12-19 DIAGNOSIS — I25.10 CAD IN NATIVE ARTERY: ICD-10-CM

## 2017-12-19 DIAGNOSIS — E78.00 HYPERCHOLESTEROLEMIA: ICD-10-CM

## 2017-12-19 DIAGNOSIS — I73.9 PERIPHERAL VASCULAR DISEASE (HCC): ICD-10-CM

## 2017-12-19 DIAGNOSIS — R09.89 BILATERAL CAROTID BRUITS: ICD-10-CM

## 2017-12-19 DIAGNOSIS — R60.9 PERIPHERAL EDEMA: ICD-10-CM

## 2017-12-19 DIAGNOSIS — I95.1 HYPOTENSION, POSTURAL: ICD-10-CM

## 2017-12-19 PROCEDURE — 99213 OFFICE O/P EST LOW 20 MIN: CPT | Performed by: NURSE PRACTITIONER

## 2017-12-19 RX ORDER — MIDODRINE HYDROCHLORIDE 2.5 MG/1
2.5 TABLET ORAL 3 TIMES DAILY
Qty: 270 TABLET | Refills: 3 | Status: SHIPPED | OUTPATIENT
Start: 2017-12-19 | End: 2018-04-05 | Stop reason: SDUPTHER

## 2017-12-19 RX ORDER — POTASSIUM CHLORIDE 750 MG/1
10 CAPSULE, EXTENDED RELEASE ORAL DAILY
Qty: 90 CAPSULE | Refills: 3 | Status: SHIPPED | OUTPATIENT
Start: 2017-12-19 | End: 2019-04-29 | Stop reason: SDUPTHER

## 2017-12-19 RX ORDER — FLUDROCORTISONE ACETATE 0.1 MG/1
0.1 TABLET ORAL 3 TIMES WEEKLY
Qty: 90 TABLET | Refills: 3 | Status: SHIPPED | OUTPATIENT
Start: 2017-12-20 | End: 2019-04-29 | Stop reason: SDUPTHER

## 2017-12-19 RX ORDER — BUDESONIDE AND FORMOTEROL FUMARATE DIHYDRATE 160; 4.5 UG/1; UG/1
2 AEROSOL RESPIRATORY (INHALATION)
Qty: 10.2 G | Refills: 11 | Status: SHIPPED | OUTPATIENT
Start: 2017-12-19 | End: 2018-04-05 | Stop reason: SDUPTHER

## 2017-12-19 RX ORDER — CLONIDINE HYDROCHLORIDE 0.1 MG/1
0.1 TABLET ORAL EVERY 12 HOURS SCHEDULED
Qty: 180 TABLET | Refills: 3 | Status: SHIPPED | OUTPATIENT
Start: 2017-12-19 | End: 2018-04-05 | Stop reason: SDUPTHER

## 2017-12-19 RX ORDER — ROSUVASTATIN CALCIUM 40 MG/1
40 TABLET, COATED ORAL DAILY
Qty: 90 TABLET | Refills: 3 | Status: SHIPPED | OUTPATIENT
Start: 2017-12-19 | End: 2019-03-26 | Stop reason: SDUPTHER

## 2017-12-19 RX ORDER — LISINOPRIL 40 MG/1
40 TABLET ORAL DAILY
Qty: 90 TABLET | Refills: 3 | Status: SHIPPED | OUTPATIENT
Start: 2017-12-19 | End: 2018-04-16 | Stop reason: SDUPTHER

## 2017-12-19 RX ORDER — FUROSEMIDE 20 MG/1
20 TABLET ORAL DAILY
Qty: 90 TABLET | Refills: 3 | Status: SHIPPED | OUTPATIENT
Start: 2017-12-19 | End: 2019-04-29 | Stop reason: SDUPTHER

## 2017-12-19 RX ORDER — ISOSORBIDE MONONITRATE 30 MG/1
30 TABLET, EXTENDED RELEASE ORAL DAILY
Qty: 90 TABLET | Refills: 3 | Status: SHIPPED | OUTPATIENT
Start: 2017-12-19 | End: 2019-04-29 | Stop reason: SDUPTHER

## 2017-12-19 NOTE — PATIENT INSTRUCTIONS
Palpitations  A palpitation is the feeling that your heartbeat is irregular or is faster than normal. It may feel like your heart is fluttering or skipping a beat. Palpitations are usually not a serious problem. They may be caused by many things, including smoking, caffeine, alcohol, stress, and certain medicines. Although most causes of palpitations are not serious, palpitations can be a sign of a serious medical problem. In some cases, you may need further medical evaluation.  HOME CARE INSTRUCTIONS  Pay attention to any changes in your symptoms. Take these actions to help with your condition:  · Avoid the following:    Caffeinated coffee, tea, soft drinks, diet pills, and energy drinks.    Chocolate.    Alcohol.  · Do not use any tobacco products, such as cigarettes, chewing tobacco, and e-cigarettes. If you need help quitting, ask your health care provider.  · Try to reduce your stress and anxiety. Things that can help you relax include:    Yoga.    Meditation.    Physical activity, such as swimming, jogging, or walking.    Biofeedback. This is a method that helps you learn to use your mind to control things in your body, such as your heartbeats.  · Get plenty of rest and sleep.  · Take over-the-counter and prescription medicines only as told by your health care provider.  · Keep all follow-up visits as told by your health care provider. This is important.  SEEK MEDICAL CARE IF:  · You continue to have a fast or irregular heartbeat after 24 hours.  · Your palpitations occur more often.  SEEK IMMEDIATE MEDICAL CARE IF:  · You have chest pain or shortness of breath.  · You have a severe headache.  · You feel dizzy or you faint.     This information is not intended to replace advice given to you by your health care provider. Make sure you discuss any questions you have with your health care provider.     Document Released: 12/15/2001 Document Revised: 04/10/2017 Document Reviewed: 09/01/2016  [employer]  Patient Education ©2017 [employer].  Edema  Edema is an abnormal buildup of fluids in your body tissues. Edema is somewhat dependent on gravity to pull the fluid to the lowest place in your body. That makes the condition more common in the legs and thighs (lower extremities). Painless swelling of the feet and ankles is common and becomes more likely as you get older. It is also common in looser tissues, like around your eyes.   When the affected area is squeezed, the fluid may move out of that spot and leave a dent for a few moments. This dent is called pitting.   CAUSES   There are many possible causes of edema. Eating too much salt and being on your feet or sitting for a long time can cause edema in your legs and ankles. Hot weather may make edema worse. Common medical causes of edema include:  · Heart failure.  · Liver disease.  · Kidney disease.  · Weak blood vessels in your legs.  · Cancer.  · An injury.  · Pregnancy.  · Some medications.  · Obesity.   SYMPTOMS   Edema is usually painless. Your skin may look swollen or shiny.   DIAGNOSIS   Your health care provider may be able to diagnose edema by asking about your medical history and doing a physical exam. You may need to have tests such as X-rays, an electrocardiogram, or blood tests to check for medical conditions that may cause edema.   TREATMENT   Edema treatment depends on the cause. If you have heart, liver, or kidney disease, you need the treatment appropriate for these conditions. General treatment may include:  · Elevation of the affected body part above the level of your heart.  · Compression of the affected body part. Pressure from elastic bandages or support stockings squeezes the tissues and forces fluid back into the blood vessels. This keeps fluid from entering the tissues.  · Restriction of fluid and salt intake.  · Use of a water pill (diuretic). These medications are appropriate only for some types of edema. They pull fluid out of your body  and make you urinate more often. This gets rid of fluid and reduces swelling, but diuretics can have side effects. Only use diuretics as directed by your health care provider.  HOME CARE INSTRUCTIONS   · Keep the affected body part above the level of your heart when you are lying down.    · Do not sit still or stand for prolonged periods.    · Do not put anything directly under your knees when lying down.  · Do not wear constricting clothing or garters on your upper legs.    · Exercise your legs to work the fluid back into your blood vessels. This may help the swelling go down.    · Wear elastic bandages or support stockings to reduce ankle swelling as directed by your health care provider.    · Eat a low-salt diet to reduce fluid if your health care provider recommends it.    · Only take medicines as directed by your health care provider.   SEEK MEDICAL CARE IF:   · Your edema is not responding to treatment.  · You have heart, liver, or kidney disease and notice symptoms of edema.  · You have edema in your legs that does not improve after elevating them.    · You have sudden and unexplained weight gain.  SEEK IMMEDIATE MEDICAL CARE IF:   · You develop shortness of breath or chest pain.    · You cannot breathe when you lie down.  · You develop pain, redness, or warmth in the swollen areas.    · You have heart, liver, or kidney disease and suddenly get edema.  · You have a fever and your symptoms suddenly get worse.  MAKE SURE YOU:   · Understand these instructions.  · Will watch your condition.  · Will get help right away if you are not doing well or get worse.     This information is not intended to replace advice given to you by your health care provider. Make sure you discuss any questions you have with your health care provider.     Document Released: 12/18/2006 Document Revised: 04/10/2017 Document Reviewed: 10/10/2014  [employer] Interactive Patient Education ©2017 [employer]

## 2017-12-19 NOTE — PROGRESS NOTES
Subjective   Barby Sue is a 62 y.o. female     Chief Complaint   Patient presents with   • Coronary Artery Disease     Here for 6 mo. f/u   • Hypertension   • Palpitations   • Hyperlipidemia   • Peripheral Vascular Disease   • COPD   • Heartburn   • Sleep Apnea       HPI    Problem List:    1. Coronary artery disease  1.1 Nonobstructive per catheterization, 2012.  1.2 Follow up stress 2015 demonstrated chest wall attenuation but no sign of ischemia, preserved ER  1.3 Stress Test 16 - no ischemia; low risk  2. PVD with history of aortobifemoral bypass   2.1 BLE Arterial U/S 16 - < or equal to 50% right common femoral artery; high-grade stenosis left posterior tibial artery 50-75% stenosis  2.2 CTA Abd with runoff 17 - patent aorta bi-iliac graft, mild narrowing seen at prox aspect of the celiac trunk.  No high grade stenosis. Fatty infiltration of the liver.   3. Chronic hypotension   4. Hyperlipidemia   5. Carotid Artery Disease   5.1 Carotid Artery U/S 16 - 50-75% right external carotid; 16-49% KENNEDY and LICA; antegrade flow both vertebral arteries   6. Echo 16 - mild LVH; EF > 65%; DD I; trace MR; physiological TR; mild DE  7. COPD  8. Palpitations  8.1 Event Monitor 3/31-17 - NSR - ST    Patient is a 62-year-old female who presents today for a follow-up.  She denies any chest pain or pressure.  She only has palpitations when she uses her nebulizer.  She denies any dizziness, presyncope, syncope, orthopnea or PND.  She says she only notices shortness of breath when her HR goes up, which is when she uses her nebulizer.  PCP monitors her cholesterol.  Patient has been out of some of her meds for a couple of days.  She also says her baby brother  on Pancreatic CA in Sept, her grandfather also  from Pancreatic CA in the past.      Current Outpatient Prescriptions   Medication Sig Dispense Refill   • aspirin 81 MG EC tablet Take 1 tablet by mouth daily.     •  Biotin 1000 MCG tablet Take 1 tablet by mouth daily.     • BLACK COHOSH PO Take 1,000 mg by mouth daily.     • cyclobenzaprine (FLEXERIL) 10 MG tablet Take 1 tablet by mouth 3 (three) times a day.     • gabapentin (NEURONTIN) 600 MG tablet Take 300 mg by mouth 3 (three) times a day.     • oxyCODONE-acetaminophen (PERCOCET)  MG per tablet Take 1 tablet by mouth every 8 (eight) hours as needed for moderate pain (4-6).     • budesonide-formoterol (SYMBICORT) 160-4.5 MCG/ACT inhaler Inhale 2 puffs 2 (Two) Times a Day. Rinse mouth after use. 10.2 g 11   • CloNIDine (CATAPRES) 0.1 MG tablet Take 1 tablet by mouth Every 12 (Twelve) Hours. 180 tablet 3   • dextromethorphan-guaifenesin (ROBITUSSIN-DM)  MG/5ML syrup Take 5 mL by mouth. Take every 4-6 hours as needed.     • fludrocortisone 0.1 MG tablet Take 1 tablet by mouth 3 (Three) Times a Week. Take on Monday, Wednesday, and Friday. 90 tablet 3   • furosemide (LASIX) 20 MG tablet Take 1 tablet by mouth Daily. 90 tablet 3   • isosorbide mononitrate (IMDUR) 30 MG 24 hr tablet Take 1 tablet by mouth Daily. 90 tablet 3   • lisinopril (PRINIVIL,ZESTRIL) 40 MG tablet Take 1 tablet by mouth Daily. 90 tablet 3   • metoprolol tartrate (LOPRESSOR) 25 MG tablet Take 0.5 tablets by mouth Every 12 (Twelve) Hours. 90 tablet 3   • midodrine (PROAMATINE) 2.5 MG tablet Take 1 tablet by mouth 3 (Three) Times a Day. 270 tablet 3   • nitroglycerin (NITROSTAT) 0.4 MG SL tablet Place 1 tablet under the tongue Every 5 (Five) Minutes As Needed for chest pain. Only take up to 3 tablets. Call 911 if pain persists. 25 tablet 5   • pantoprazole (PROTONIX) 40 MG EC tablet Take 40 mg by mouth daily.     • potassium chloride (MICRO-K) 10 MEQ CR capsule Take 1 capsule by mouth Daily. 90 capsule 3   • rosuvastatin (CRESTOR) 40 MG tablet Take 1 tablet by mouth Daily. 90 tablet 3   • vitamin D (ERGOCALCIFEROL) 81664 UNITS capsule capsule 1 (One) Time Per Week.       No current  facility-administered medications for this visit.        ALLERGIES    Adhesive tape; Ibuprofen; and Latex    Past Medical History:   Diagnosis Date   • Anxiety    • Back pain    • Carotid bruit    • Chest pain    • COPD (chronic obstructive pulmonary disease)    • Dyslipidemia    • Fainting    • Functional murmur    • GERD (gastroesophageal reflux disease)    • Hypercholesterolemia    • Hyperlipidemia    • Hypertension    • Hypokalemia    • Orthostatic hypotension    • Orthostatic hypotension    • Peripheral vascular disease    • Peripheral vascular disease     PVD with history of aortobifemoral bypass   • Shortness of breath    • Sleep apnea        Social History     Social History   • Marital status:      Spouse name: N/A   • Number of children: N/A   • Years of education: N/A     Occupational History   • Not on file.     Social History Main Topics   • Smoking status: Former Smoker   • Smokeless tobacco: Never Used   • Alcohol use No   • Drug use: No   • Sexual activity: Defer     Other Topics Concern   • Not on file     Social History Narrative       Family History   Problem Relation Age of Onset   • Heart failure Mother      Congestive   • Hypertension Mother    • Heart failure Sister      Congestive   • Coronary artery disease Sister    • Heart attack Sister      Acute   • Hypertension Sister    • Sudden death Sister    • Coronary artery disease Brother    • Seizures Brother    • Hypertension Brother    • Heart failure Maternal Grandmother      Congestive       Review of Systems   Constitutional: Positive for fatigue. Negative for diaphoresis.   HENT: Positive for congestion, ear pain (rt.), rhinorrhea and sneezing. Negative for postnasal drip.    Eyes: Positive for visual disturbance (wears glasses).   Respiratory: Positive for cough (thinks she hsa bronchitis ) and shortness of breath (thinks when her HR goes up when she uses her neb treatment ). Negative for chest tightness.    Cardiovascular:  "Positive for palpitations (when she uses her nebulizer ) and leg swelling (had not had medication for 2 weeks ). Negative for chest pain.   Gastrointestinal: Negative for nausea and vomiting.   Endocrine: Negative.    Genitourinary: Positive for difficulty urinating (feels like she needs to go, but just does a few drops and then go back a few minutes later will do fine ).   Musculoskeletal: Positive for arthralgias, back pain, myalgias and neck pain.   Skin: Negative.    Allergic/Immunologic: Positive for environmental allergies.   Neurological: Negative for dizziness, syncope and light-headedness.   Hematological: Bruises/bleeds easily (bruise).   Psychiatric/Behavioral: Positive for sleep disturbance.       Objective   /97 (BP Location: Left arm, Patient Position: Sitting)  Pulse 98  Ht 154.9 cm (61\")  Wt 82.9 kg (182 lb 12.8 oz)  SpO2 94%  BMI 34.54 kg/m2  Vitals:    12/19/17 1531   BP: 150/97   BP Location: Left arm   Patient Position: Sitting   Pulse: 98   SpO2: 94%   Weight: 82.9 kg (182 lb 12.8 oz)   Height: 154.9 cm (61\")      Lab Results (most recent)     None        Physical Exam   Constitutional: She is oriented to person, place, and time. Vital signs are normal. She appears well-developed and well-nourished. She is active and cooperative.   HENT:   Head: Normocephalic.   Mouth/Throat: She has dentures (upper and lower ).   Eyes: Lids are normal.   Wears glasses    Neck: Normal carotid pulses, no hepatojugular reflux and no JVD present. Carotid bruit is present (right ).   Cardiovascular: Normal rate, regular rhythm and normal heart sounds.    Pulses:       Radial pulses are 2+ on the right side, and 2+ on the left side.        Dorsalis pedis pulses are 2+ on the right side, and 2+ on the left side.        Posterior tibial pulses are 2+ on the right side, and 2+ on the left side.   Trace edema RLE; 1+ edema LLE    Pulmonary/Chest: Effort normal and breath sounds normal.   Abdominal: Normal " appearance and bowel sounds are normal.   Neurological: She is alert and oriented to person, place, and time.   Skin: Skin is warm, dry and intact.   Psychiatric: She has a normal mood and affect. Her speech is normal and behavior is normal. Judgment and thought content normal. Cognition and memory are normal.       Procedure   Procedures         Assessment/Plan      Diagnosis Plan   1. Essential hypertension  CloNIDine (CATAPRES) 0.1 MG tablet    lisinopril (PRINIVIL,ZESTRIL) 40 MG tablet    metoprolol tartrate (LOPRESSOR) 25 MG tablet   2. CAD in native artery  isosorbide mononitrate (IMDUR) 30 MG 24 hr tablet    metoprolol tartrate (LOPRESSOR) 25 MG tablet   3. Bilateral carotid bruits     4. Hypercholesterolemia     5. Dyslipidemia  rosuvastatin (CRESTOR) 40 MG tablet   6. Hypotension, postural  fludrocortisone 0.1 MG tablet    midodrine (PROAMATINE) 2.5 MG tablet   7. Peripheral vascular disease     8. Peripheral edema  furosemide (LASIX) 20 MG tablet    potassium chloride (MICRO-K) 10 MEQ CR capsule   9. Chronic obstructive pulmonary disease, unspecified COPD type  budesonide-formoterol (SYMBICORT) 160-4.5 MCG/ACT inhaler       Return in about 6 months (around 6/19/2018).       HTN - doing well.  CAD - on ASA, beta and statin.  Carotid Artery Disease - on ASA and statin.  Dyslipidemia - on Crestor and monitored by PCP.  PVD - on ASA and statin.  Peripheral edema - uses lasix as needed.  COPD - refilled patient's inhaler.  She will continue her medication regimen.  She will follow-up in 6 mos or sooner if any changes.     Electronically signed by:

## 2018-04-05 DIAGNOSIS — I25.10 CAD IN NATIVE ARTERY: ICD-10-CM

## 2018-04-05 DIAGNOSIS — J44.9 CHRONIC OBSTRUCTIVE PULMONARY DISEASE, UNSPECIFIED COPD TYPE (HCC): ICD-10-CM

## 2018-04-05 DIAGNOSIS — R60.9 PERIPHERAL EDEMA: ICD-10-CM

## 2018-04-05 DIAGNOSIS — I10 ESSENTIAL HYPERTENSION: ICD-10-CM

## 2018-04-05 DIAGNOSIS — I95.1 HYPOTENSION, POSTURAL: ICD-10-CM

## 2018-04-05 RX ORDER — BUDESONIDE AND FORMOTEROL FUMARATE DIHYDRATE 160; 4.5 UG/1; UG/1
2 AEROSOL RESPIRATORY (INHALATION)
Qty: 10.2 G | Refills: 11 | Status: SHIPPED | OUTPATIENT
Start: 2018-04-05 | End: 2018-04-16 | Stop reason: SDUPTHER

## 2018-04-05 RX ORDER — MIDODRINE HYDROCHLORIDE 2.5 MG/1
2.5 TABLET ORAL 3 TIMES DAILY
Qty: 270 TABLET | Refills: 3 | Status: SHIPPED | OUTPATIENT
Start: 2018-04-05 | End: 2019-04-29 | Stop reason: SDUPTHER

## 2018-04-05 RX ORDER — CLONIDINE HYDROCHLORIDE 0.1 MG/1
0.1 TABLET ORAL EVERY 12 HOURS SCHEDULED
Qty: 180 TABLET | Refills: 3 | Status: SHIPPED | OUTPATIENT
Start: 2018-04-05 | End: 2022-12-13 | Stop reason: SDUPTHER

## 2018-04-16 DIAGNOSIS — J44.9 CHRONIC OBSTRUCTIVE PULMONARY DISEASE, UNSPECIFIED COPD TYPE (HCC): ICD-10-CM

## 2018-04-16 DIAGNOSIS — I10 ESSENTIAL HYPERTENSION: ICD-10-CM

## 2018-04-16 RX ORDER — LISINOPRIL 40 MG/1
40 TABLET ORAL DAILY
Qty: 90 TABLET | Refills: 3 | Status: SHIPPED | OUTPATIENT
Start: 2018-04-16 | End: 2018-06-19 | Stop reason: SINTOL

## 2018-04-16 RX ORDER — BUDESONIDE AND FORMOTEROL FUMARATE DIHYDRATE 160; 4.5 UG/1; UG/1
2 AEROSOL RESPIRATORY (INHALATION)
Qty: 10.2 G | Refills: 5 | Status: SHIPPED | OUTPATIENT
Start: 2018-04-16 | End: 2018-06-19

## 2018-06-19 ENCOUNTER — OFFICE VISIT (OUTPATIENT)
Dept: CARDIOLOGY | Facility: CLINIC | Age: 63
End: 2018-06-19

## 2018-06-19 VITALS
BODY MASS INDEX: 35.38 KG/M2 | SYSTOLIC BLOOD PRESSURE: 149 MMHG | HEIGHT: 61 IN | OXYGEN SATURATION: 94 % | WEIGHT: 187.4 LBS | HEART RATE: 88 BPM | DIASTOLIC BLOOD PRESSURE: 95 MMHG

## 2018-06-19 DIAGNOSIS — I25.10 CAD IN NATIVE ARTERY: Primary | ICD-10-CM

## 2018-06-19 DIAGNOSIS — E78.5 DYSLIPIDEMIA: ICD-10-CM

## 2018-06-19 DIAGNOSIS — I10 ESSENTIAL HYPERTENSION: ICD-10-CM

## 2018-06-19 DIAGNOSIS — K21.9 GASTROESOPHAGEAL REFLUX DISEASE, ESOPHAGITIS PRESENCE NOT SPECIFIED: ICD-10-CM

## 2018-06-19 DIAGNOSIS — J44.9 CHRONIC OBSTRUCTIVE PULMONARY DISEASE, UNSPECIFIED COPD TYPE (HCC): ICD-10-CM

## 2018-06-19 DIAGNOSIS — R06.02 SHORTNESS OF BREATH: ICD-10-CM

## 2018-06-19 DIAGNOSIS — E78.00 HYPERCHOLESTEROLEMIA: ICD-10-CM

## 2018-06-19 PROCEDURE — 99213 OFFICE O/P EST LOW 20 MIN: CPT | Performed by: NURSE PRACTITIONER

## 2018-06-19 PROCEDURE — 93000 ELECTROCARDIOGRAM COMPLETE: CPT | Performed by: NURSE PRACTITIONER

## 2018-06-19 RX ORDER — LOSARTAN POTASSIUM 100 MG/1
100 TABLET ORAL DAILY
Qty: 90 TABLET | Refills: 3 | Status: SHIPPED | OUTPATIENT
Start: 2018-06-19 | End: 2019-04-29 | Stop reason: SDUPTHER

## 2018-06-19 RX ORDER — PANTOPRAZOLE SODIUM 40 MG/1
40 TABLET, DELAYED RELEASE ORAL DAILY
Qty: 90 TABLET | Refills: 3 | Status: SHIPPED | OUTPATIENT
Start: 2018-06-19 | End: 2020-06-25 | Stop reason: SDUPTHER

## 2018-06-19 NOTE — PATIENT INSTRUCTIONS
MyPlate from [employer]  The general, healthful diet is based on the 2010 Dietary Guidelines for Americans. The amount of food you need to eat from each food group depends on your age, sex, and level of physical activity and can be individualized by a dietitian. Go to ChooseMyPlate.gov for more information.  What do I need to know about the MyPlate plan?  · Enjoy your food, but eat less.  · Avoid oversized portions.  ? ½ of your plate should include fruits and vegetables.  ? ¼ of your plate should be grains.  ? ¼ of your plate should be protein.  Grains  · Make at least half of your grains whole grains.  · For a 2,000 calorie daily food plan, eat 6 oz every day.  · 1 oz is about 1 slice bread, 1 cup cereal, or ½ cup cooked rice, cereal, or pasta.  Vegetables  · Make half your plate fruits and vegetables.  · For a 2,000 calorie daily food plan, eat 2½ cups every day.  · 1 cup is about 1 cup raw or cooked vegetables or vegetable juice or 2 cups raw leafy greens.  Fruits  · Make half your plate fruits and vegetables.  · For a 2,000 calorie daily food plan, eat 2 cups every day.  · 1 cup is about 1 cup fruit or 100% fruit juice or ½ cup dried fruit.  Protein  · For a 2,000 calorie daily food plan, eat 5½ oz every day.  · 1 oz is about 1 oz meat, poultry, or fish, ¼ cup cooked beans, 1 egg, 1 Tbsp peanut butter, or ½ oz nuts or seeds.  Dairy  · Switch to fat-free or low-fat (1%) milk.  · For a 2,000 calorie daily food plan, eat 3 cups every day.  · 1 cup is about 1 cup milk or yogurt or soy milk (soy beverage), 1½ oz natural cheese, or 2 oz processed cheese.  Fats, Oils, and Empty Calories  · Only small amounts of oils are recommended.  · Empty calories are calories from solid fats or added sugars.  · Compare sodium in foods like soup, bread, and frozen meals. Choose the foods with lower numbers.  · Drink water instead of sugary drinks.  What foods can I eat?  Grains  Whole grains such as whole wheat, quinoa, millet, and  bulgur. Bread, rolls, and pasta made from whole grains. Brown or wild rice. Hot or cold cereals made from whole grains and without added sugar.  Vegetables  All fresh vegetables, especially fresh red, dark green, or orange vegetables. Peas and beans. Low-sodium frozen or canned vegetables prepared without added salt. Low-sodium vegetable juices.  Fruits  All fresh, frozen, and dried fruits. Canned fruit packed in water or fruit juice without added sugar. Fruit juices without added sugar.  Meats and Other Protein Sources  Boiled, baked, or grilled lean meat trimmed of fat. Skinless poultry. Fresh seafood and shellfish. Canned seafood packed in water. Unsalted nuts and unsalted nut butters. Tofu. Dried beans and pea. Eggs.  Dairy  Low-fat or fat-free milk, yogurt, and cheeses.  Sweets and Desserts  Frozen desserts made from low-fat milk.  Fats and Oils  Olive, peanut, and canola oils and margarine. Salad dressing and mayonnaise made from these oils.  Other  Soups and casseroles made from allowed ingredients and without added fat or salt.  The items listed above may not be a complete list of recommended foods or beverages. Contact your dietitian for more options.  What foods are not recommended?  Grains  Sweetened, low-fiber cereals. Packaged baked goods. Snack crackers and chips. Cheese crackers, butter crackers, and biscuits. Frozen waffles, sweet breads, doughnuts, pastries, packaged baking mixes, pancakes, cakes, and cookies.  Vegetables  Regular canned or frozen vegetables or vegetables prepared with salt. Canned tomatoes. Canned tomato sauce. Fried vegetables. Vegetables in cream sauce or cheese sauce.  Fruits  Fruits packed in syrup or made with added sugar.  Meats and Other Protein Sources  Marbled or fatty meats such as ribs. Poultry with skin. Fried meats, poultry, eggs, or fish. Sausages, hot dogs, and deli meats such as pastrami, bologna, or salami.  Dairy  Whole milk, cream, cheeses made from whole milk,  sour cream. Ice cream or yogurt made from whole milk or with added sugar.  Beverages  For adults, no more than one alcoholic drink per day. Regular soft drinks or other sugary beverages. Juice drinks.  Sweets and Desserts  Sugary or fatty desserts, candy, and other sweets.  Fats and Oils  Solid shortening or partially hydrogenated oils. Solid margarine. Margarine that contains trans fats. Butter.  The items listed above may not be a complete list of foods and beverages to avoid. Contact your dietitian for more information.  This information is not intended to replace advice given to you by your health care provider. Make sure you discuss any questions you have with your health care provider.  Document Released: 01/06/2009 Document Revised: 05/25/2017 Document Reviewed: 11/26/2014  Elsevier Interactive Patient Education © 2018 [employer]

## 2018-06-19 NOTE — PROGRESS NOTES
Subjective   Barby Sue is a 63 y.o. female     Chief Complaint   Patient presents with   • Hypertension     presents as a follow up   • Palpitations       HPI    Problem List:    1. Coronary artery disease  1.1 Nonobstructive per catheterization, March 2012.  1.2 Follow up stress August 2015 demonstrated chest wall attenuation but no sign of ischemia, preserved ER  1.3 Stress Test 12/14/16 - no ischemia; low risk  2. PVD with history of aortobifemoral bypass 2014  2.1 BLE Arterial U/S 12/14/16 - < or equal to 50% right common femoral artery; high-grade stenosis left posterior tibial artery 50-75% stenosis  2.2 CTA Abd with runoff 5/16/17 - patent aorta bi-iliac graft, mild narrowing seen at prox aspect of the celiac trunk.  No high grade stenosis. Fatty infiltration of the liver.   3. Chronic hypotension   4. Hyperlipidemia   5. Carotid Artery Disease   5.1 Carotid Artery U/S 12/14/16 - 50-75% right external carotid; 16-49% KENNEDY and LICA; antegrade flow both vertebral arteries   6. Echo 12/14/16 - mild LVH; EF > 65%; DD I; trace MR; physiological TR; mild ND  7. COPD  8. Palpitations  8.1 Event Monitor 3/31-4/11/17 - NSR - [facility]    Patient is a 63-year-old female who presents today for follow-up.  She denies any chest pain, pressure, palpations, fluttering, dizziness, presyncope, syncope, orthopnea or PND.  She will get a little bit of swelling but it just really depends.  She says that she uses her Lasix to completely resolved.  She denies any shortness of breath with activity.  PCP monitors her cholesterol.  She complains of a cough with her lisinopril.    Current Outpatient Prescriptions   Medication Sig Dispense Refill   • aspirin 81 MG EC tablet Take 1 tablet by mouth daily.     • Biotin 1000 MCG tablet Take 1 tablet by mouth daily.     • BLACK COHOSH PO Take 1,000 mg by mouth daily.     • CloNIDine (CATAPRES) 0.1 MG tablet Take 1 tablet by mouth Every 12 (Twelve) Hours. 180 tablet 3   • cyclobenzaprine  (FLEXERIL) 10 MG tablet Take 1 tablet by mouth 3 (three) times a day.     • dextromethorphan-guaifenesin (ROBITUSSIN-DM)  MG/5ML syrup Take 5 mL by mouth. Take every 4-6 hours as needed.     • fludrocortisone 0.1 MG tablet Take 1 tablet by mouth 3 (Three) Times a Week. Take on Monday, Wednesday, and Friday. 90 tablet 3   • furosemide (LASIX) 20 MG tablet Take 1 tablet by mouth Daily. 90 tablet 3   • gabapentin (NEURONTIN) 600 MG tablet Take 300 mg by mouth 3 (three) times a day.     • isosorbide mononitrate (IMDUR) 30 MG 24 hr tablet Take 1 tablet by mouth Daily. 90 tablet 3   • metoprolol tartrate (LOPRESSOR) 25 MG tablet Take 0.5 tablets by mouth Every 12 (Twelve) Hours. 90 tablet 3   • midodrine (PROAMATINE) 2.5 MG tablet Take 1 tablet by mouth 3 (Three) Times a Day. 270 tablet 3   • nitroglycerin (NITROSTAT) 0.4 MG SL tablet Place 1 tablet under the tongue Every 5 (Five) Minutes As Needed for chest pain. Only take up to 3 tablets. Call 911 if pain persists. 25 tablet 5   • oxyCODONE-acetaminophen (PERCOCET)  MG per tablet Take 1 tablet by mouth every 8 (eight) hours as needed for moderate pain (4-6).     • pantoprazole (PROTONIX) 40 MG EC tablet Take 1 tablet by mouth Daily. 90 tablet 3   • potassium chloride (MICRO-K) 10 MEQ CR capsule Take 1 capsule by mouth Daily. 90 capsule 3   • rosuvastatin (CRESTOR) 40 MG tablet Take 1 tablet by mouth Daily. 90 tablet 3   • vitamin D (ERGOCALCIFEROL) 08918 UNITS capsule capsule 1 (One) Time Per Week.     • losartan (COZAAR) 100 MG tablet Take 1 tablet by mouth Daily. 90 tablet 3   • tiotropium (SPIRIVA) 18 MCG per inhalation capsule Place 1 capsule into inhaler and inhale Daily. 90 capsule 3     No current facility-administered medications for this visit.        ALLERGIES    Lisinopril; Adhesive tape; Ibuprofen; and Latex    Past Medical History:   Diagnosis Date   • Anxiety    • Back pain    • Carotid bruit    • Chest pain    • COPD (chronic obstructive  pulmonary disease)    • Dyslipidemia    • Fainting    • Functional murmur    • GERD (gastroesophageal reflux disease)    • Hypercholesterolemia    • Hyperlipidemia    • Hypertension    • Hypokalemia    • Orthostatic hypotension    • Orthostatic hypotension    • Peripheral vascular disease    • Peripheral vascular disease     PVD with history of aortobifemoral bypass   • Shortness of breath    • Sleep apnea        Social History     Social History   • Marital status:      Spouse name: N/A   • Number of children: N/A   • Years of education: N/A     Occupational History   • Not on file.     Social History Main Topics   • Smoking status: Former Smoker   • Smokeless tobacco: Never Used   • Alcohol use No   • Drug use: No   • Sexual activity: Defer     Other Topics Concern   • Not on file     Social History Narrative   • No narrative on file       Family History   Problem Relation Age of Onset   • Heart failure Mother         Congestive   • Hypertension Mother    • Heart failure Sister         Congestive   • Coronary artery disease Sister    • Heart attack Sister         Acute   • Hypertension Sister    • Sudden death Sister    • Coronary artery disease Brother    • Seizures Brother    • Hypertension Brother    • Heart failure Maternal Grandmother         Congestive       Review of Systems   Constitutional: Positive for fatigue. Negative for diaphoresis.   HENT: Positive for sneezing. Negative for rhinorrhea.    Eyes: Positive for visual disturbance ( wears glasses ).   Respiratory: Positive for cough (dry). Negative for apnea and shortness of breath.    Cardiovascular: Positive for leg swelling (just depends). Negative for chest pain and palpitations.   Gastrointestinal: Positive for constipation and diarrhea. Negative for nausea and vomiting.   Endocrine: Negative.    Genitourinary: Positive for frequency. Negative for difficulty urinating.   Musculoskeletal: Positive for arthralgias, back pain (fell off a foot  "stool 7-8 yrs ago) and myalgias ( feet pain). Negative for neck pain.   Skin: Negative.    Allergic/Immunologic: Negative for environmental allergies and food allergies.   Neurological: Negative for dizziness, syncope and headaches.   Hematological: Bruises/bleeds easily ( bruises easily).   Psychiatric/Behavioral: The patient is nervous/anxious.        Objective   /95 (BP Location: Left arm, Patient Position: Sitting)   Pulse 88   Ht 154.9 cm (61\")   Wt 85 kg (187 lb 6.4 oz)   SpO2 94%   BMI 35.41 kg/m²   Vitals:    06/19/18 1549   BP: 149/95   BP Location: Left arm   Patient Position: Sitting   Pulse: 88   SpO2: 94%   Weight: 85 kg (187 lb 6.4 oz)   Height: 154.9 cm (61\")      Lab Results (most recent)     None        Physical Exam   Constitutional: She is oriented to person, place, and time. Vital signs are normal. She appears well-developed and well-nourished. She is active and cooperative.   HENT:   Head: Normocephalic.   Mouth/Throat: She has dentures (upper and lower ).   Eyes: Lids are normal.   Wears glasses    Neck: Normal carotid pulses, no hepatojugular reflux and no JVD present. Carotid bruit is present (right bruit ).   Cardiovascular: Normal rate, regular rhythm and normal heart sounds.    Pulses:       Radial pulses are 2+ on the right side, and 2+ on the left side.        Dorsalis pedis pulses are 2+ on the right side, and 2+ on the left side.        Posterior tibial pulses are 2+ on the right side, and 2+ on the left side.   No edema BLE.   Pulmonary/Chest: Effort normal and breath sounds normal.   Abdominal: Normal appearance and bowel sounds are normal.   Neurological: She is alert and oriented to person, place, and time.   Skin: Skin is warm, dry and intact.   Psychiatric: She has a normal mood and affect. Her speech is normal and behavior is normal. Judgment and thought content normal. Cognition and memory are normal.       Procedure     ECG 12 Lead  Date/Time: 6/19/2018 4:15 " PM  Performed by: JOVANNY MOYA  Authorized by: JOVANNY MOYA   Comparison: compared with previous ECG from 6/19/2017  Similar to previous ECG  Rhythm: sinus rhythm  Rate: normal  BPM: 85  T wave flattening noted on lead: nonspecific changes   QRS axis: normal  Clinical impression: non-specific ECG                 Assessment/Plan      Diagnosis Plan   1. CAD in native artery     2. Essential hypertension  losartan (COZAAR) 100 MG tablet   3. Hypercholesterolemia     4. Gastroesophageal reflux disease, esophagitis presence not specified  pantoprazole (PROTONIX) 40 MG EC tablet   5. Chronic obstructive pulmonary disease, unspecified COPD type  tiotropium (SPIRIVA) 18 MCG per inhalation capsule   6. Shortness of breath  tiotropium (SPIRIVA) 18 MCG per inhalation capsule   7. Dyslipidemia         Return in about 8 weeks (around 8/14/2018).    CAD-patient is on aspirin, beta and statin.  Hypertension-patient doing well however we will stop lisinopril start losartan due to cough.  High cholesterol-patient is on Crestor and monitor by PCP.  Acid reflux-refill patient's protonix.  COPD-patient's insurance will not pay for her Symbicort so I sent in Spiriva.  Shortness of breath-resolved.  She will continue her medication regimen otherwise.  She will follow-up in 8 weeks or sooner if any changes.     Received fax from pharmacy patient has to fail kompany prior to Spiriva, sent in [employer].       Patient's Body mass index is 35.41 kg/m². BMI is above normal parameters. Recommendations include: educational material.      Electronically signed by:

## 2018-08-15 ENCOUNTER — OFFICE VISIT (OUTPATIENT)
Dept: CARDIOLOGY | Facility: CLINIC | Age: 63
End: 2018-08-15

## 2018-08-15 VITALS
HEART RATE: 87 BPM | SYSTOLIC BLOOD PRESSURE: 134 MMHG | DIASTOLIC BLOOD PRESSURE: 86 MMHG | OXYGEN SATURATION: 96 % | WEIGHT: 188.6 LBS | BODY MASS INDEX: 35.61 KG/M2 | HEIGHT: 61 IN

## 2018-08-15 DIAGNOSIS — I25.10 CAD IN NATIVE ARTERY: Primary | ICD-10-CM

## 2018-08-15 DIAGNOSIS — E78.5 DYSLIPIDEMIA: ICD-10-CM

## 2018-08-15 DIAGNOSIS — E78.00 ELEVATED CHOLESTEROL: ICD-10-CM

## 2018-08-15 DIAGNOSIS — I10 ESSENTIAL HYPERTENSION: ICD-10-CM

## 2018-08-15 PROCEDURE — 99213 OFFICE O/P EST LOW 20 MIN: CPT | Performed by: NURSE PRACTITIONER

## 2018-08-15 NOTE — PROGRESS NOTES
Subjective   Barby Sue is a 63 y.o. female     Chief Complaint   Patient presents with   • Coronary Artery Disease     presents as a follow up   • Hypertension   • Palpitations       HPI    Problem List:    1. Coronary artery disease  1.1 Nonobstructive per catheterization, March 2012.  1.2 Follow up stress August 2015 demonstrated chest wall attenuation but no sign of ischemia, preserved ER  1.3 Stress Test 12/14/16 - no ischemia; low risk  2. PVD with history of aortobifemoral bypass 2014  2.1 BLE Arterial U/S 12/14/16 - < or equal to 50% right common femoral artery; high-grade stenosis left posterior tibial artery 50-75% stenosis  2.2 CTA Abd with runoff 5/16/17 - patent aorta bi-iliac graft, mild narrowing seen at prox aspect of the celiac trunk.  No high grade stenosis. Fatty infiltration of the liver.   3. Chronic hypotension   4. Hyperlipidemia   5. Carotid Artery Disease   5.1 Carotid Artery U/S 12/14/16 - 50-75% right external carotid; 16-49% KENNEDY and LICA; antegrade flow both vertebral arteries   6. Echo 12/14/16 - mild LVH; EF > 65%; DD I; trace MR; physiological TR; mild OR  7. COPD  8. Palpitations  8.1 Event Monitor 3/31-4/11/17 - NSR - ST    Patient is a 63-year-old female who presents today for a follow-up with her granddaughter her side.  She denies any chest pain, pressure, dizziness, presyncope, syncope, orthopnea or PND.  She says that she has palpitations more at night and then when she lays down and rested results.  She says she's been this way for very long time.  She does get some edema which usually notices in the afternoon.  She'll take her Lasix as needed and this will resolve.  She says she does get short of breath a lot but when she does more than normal.  PCP monitors her cholesterol.    Current Outpatient Prescriptions   Medication Sig Dispense Refill   • aspirin 81 MG EC tablet Take 1 tablet by mouth daily.     • Biotin 1000 MCG tablet Take 1 tablet by mouth daily.     • BLACK  COHOSH PO Take 1,000 mg by mouth daily.     • CloNIDine (CATAPRES) 0.1 MG tablet Take 1 tablet by mouth Every 12 (Twelve) Hours. 180 tablet 3   • cyclobenzaprine (FLEXERIL) 10 MG tablet Take 1 tablet by mouth 3 (three) times a day.     • fludrocortisone 0.1 MG tablet Take 1 tablet by mouth 3 (Three) Times a Week. Take on Monday, Wednesday, and Friday. 90 tablet 3   • furosemide (LASIX) 20 MG tablet Take 1 tablet by mouth Daily. 90 tablet 3   • gabapentin (NEURONTIN) 400 MG capsule Take 400 mg by mouth 3 (Three) Times a Day.     • isosorbide mononitrate (IMDUR) 30 MG 24 hr tablet Take 1 tablet by mouth Daily. 90 tablet 3   • losartan (COZAAR) 100 MG tablet Take 1 tablet by mouth Daily. 90 tablet 3   • metoprolol tartrate (LOPRESSOR) 25 MG tablet Take 0.5 tablets by mouth Every 12 (Twelve) Hours. 90 tablet 3   • midodrine (PROAMATINE) 2.5 MG tablet Take 1 tablet by mouth 3 (Three) Times a Day. 270 tablet 3   • nitroglycerin (NITROSTAT) 0.4 MG SL tablet Place 1 tablet under the tongue Every 5 (Five) Minutes As Needed for chest pain. Only take up to 3 tablets. Call 911 if pain persists. 25 tablet 5   • oxyCODONE-acetaminophen (PERCOCET)  MG per tablet Take 1 tablet by mouth every 8 (eight) hours as needed for moderate pain (4-6).     • pantoprazole (PROTONIX) 40 MG EC tablet Take 1 tablet by mouth Daily. 90 tablet 3   • potassium chloride (MICRO-K) 10 MEQ CR capsule Take 1 capsule by mouth Daily. 90 capsule 3   • rosuvastatin (CRESTOR) 40 MG tablet Take 1 tablet by mouth Daily. 90 tablet 3   • Umeclidinium Bromide 62.5 MCG/INH aerosol powder  Inhale 1 puff Daily. 90 each 3   • vitamin D (ERGOCALCIFEROL) 47187 UNITS capsule capsule 1 (One) Time Per Week.       No current facility-administered medications for this visit.        ALLERGIES    Lisinopril; Adhesive tape; Ibuprofen; and Latex    Past Medical History:   Diagnosis Date   • Anxiety    • Back pain    • Carotid bruit    • Chest pain    • COPD (chronic  obstructive pulmonary disease) (CMS/[facility])    • Dyslipidemia    • Fainting    • Functional murmur    • GERD (gastroesophageal reflux disease)    • Hypercholesterolemia    • Hyperlipidemia    • Hypertension    • Hypokalemia    • Orthostatic hypotension    • Orthostatic hypotension    • Peripheral vascular disease (CMS/[facility])    • Peripheral vascular disease (CMS/[facility])     PVD with history of aortobifemoral bypass   • Shortness of breath    • Sleep apnea        Social History     Social History   • Marital status:      Spouse name: N/A   • Number of children: N/A   • Years of education: N/A     Occupational History   • Not on file.     Social History Main Topics   • Smoking status: Former Smoker   • Smokeless tobacco: Never Used   • Alcohol use No   • Drug use: No   • Sexual activity: Defer     Other Topics Concern   • Not on file     Social History Narrative   • No narrative on file       Family History   Problem Relation Age of Onset   • Heart failure Mother         Congestive   • Hypertension Mother    • Heart failure Sister         Congestive   • Coronary artery disease Sister    • Heart attack Sister         Acute   • Hypertension Sister    • Sudden death Sister    • Coronary artery disease Brother    • Seizures Brother    • Hypertension Brother    • Heart failure Maternal Grandmother         Congestive       Review of Systems   Constitutional: Positive for fatigue. Negative for diaphoresis.   HENT: Positive for rhinorrhea and sneezing.    Eyes: Positive for visual disturbance ( wears glasses).   Respiratory: Positive for shortness of breath (yes a lot when do more than normal ). Negative for chest tightness.    Cardiovascular: Positive for palpitations (more at night; when she lays down her heart starts racing;just settle in and resolves. ) and leg swelling (using lasix as needed, notices it late in the evening when she has been on them all day ). Negative for chest pain.   Gastrointestinal: Positive for  "constipation. Negative for diarrhea, nausea and vomiting.   Endocrine: Negative.    Genitourinary: Negative for difficulty urinating.   Musculoskeletal: Positive for arthralgias, back pain (low back ), myalgias and neck pain.   Skin: Negative.    Allergic/Immunologic: Positive for environmental allergies. Negative for food allergies.   Neurological: Negative for dizziness, syncope and light-headedness.   Hematological: Bruises/bleeds easily.   Psychiatric/Behavioral: The patient is nervous/anxious.        Objective   /86 (BP Location: Left arm, Patient Position: Sitting)   Pulse 87   Ht 154.9 cm (61\")   Wt 85.5 kg (188 lb 9.6 oz)   SpO2 96%   BMI 35.64 kg/m²   Vitals:    08/15/18 1523   BP: 134/86   BP Location: Left arm   Patient Position: Sitting   Pulse: 87   SpO2: 96%   Weight: 85.5 kg (188 lb 9.6 oz)   Height: 154.9 cm (61\")      Lab Results (most recent)     None        Physical Exam   Constitutional: She is oriented to person, place, and time. Vital signs are normal. She appears well-developed and well-nourished. She is active and cooperative.   HENT:   Head: Normocephalic.   Mouth/Throat: She has dentures (upper and lower ).   Eyes: Lids are normal.   Wears glasses    Neck: Normal carotid pulses, no hepatojugular reflux and no JVD present. Carotid bruit is present (right ).   Cardiovascular: Normal rate, regular rhythm and normal heart sounds.    Pulses:       Radial pulses are 2+ on the right side, and 2+ on the left side.        Dorsalis pedis pulses are 2+ on the right side, and 2+ on the left side.        Posterior tibial pulses are 2+ on the right side, and 2+ on the left side.   Trace edema BLE.    Pulmonary/Chest: Effort normal and breath sounds normal.   Abdominal: Normal appearance and bowel sounds are normal.   Neurological: She is alert and oriented to person, place, and time.   Skin: Skin is warm, dry and intact.   Psychiatric: She has a normal mood and affect. Her speech is normal and " behavior is normal. Judgment and thought content normal. Cognition and memory are normal.       Procedure   Procedures         Assessment/Plan      Diagnosis Plan   1. CAD in native artery     2. Elevated cholesterol     3. Dyslipidemia     4. Essential hypertension         Return in about 6 months (around 2/15/2019).       CAD-patient is on aspirin, beta and statin.  Hypercholesteremia-patient is on Crestor and monitor by PCP.  Hypertension-patient doing well.  She will continue her medication regimen.  She'll follow-up in 6 months or sooner if any changes.        Patient's Body mass index is 35.64 kg/m². BMI is above normal parameters. Recommendations include: educational material.      Electronically signed by:

## 2018-08-15 NOTE — PATIENT INSTRUCTIONS
Obesity, Adult  Obesity is the condition of having too much total body fat. Being overweight or obese means that your weight is greater than what is considered healthy for your body size. Obesity is determined by a measurement called BMI. BMI is an estimate of body fat and is calculated from height and weight. For adults, a BMI of 30 or higher is considered obese.  Obesity can eventually lead to other health concerns and major illnesses, including:  · Stroke.  · Coronary artery disease (CAD).  · Type 2 diabetes.  · Some types of cancer, including cancers of the colon, breast, uterus, and gallbladder.  · Osteoarthritis.  · High blood pressure (hypertension).  · High cholesterol.  · Sleep apnea.  · Gallbladder stones.  · Infertility problems.    What are the causes?  The main cause of obesity is taking in (consuming) more calories than your body uses for energy. Other factors that contribute to this condition may include:  · Being born with genes that make you more likely to become obese.  · Having a medical condition that causes obesity. These conditions include:  ? Hypothyroidism.  ? Polycystic ovarian syndrome (PCOS).  ? Binge-eating disorder.  ? Cushing syndrome.  · Taking certain medicines, such as steroids, antidepressants, and seizure medicines.  · Not being physically active (sedentary lifestyle).  · Living where there are limited places to exercise safely or buy healthy foods.  · Not getting enough sleep.    What increases the risk?  The following factors may increase your risk of this condition:  · Having a family history of obesity.  · Being a woman of -American descent.  · Being a man of  descent.    What are the signs or symptoms?  Having excessive body fat is the main symptom of this condition.  How is this diagnosed?  This condition may be diagnosed based on:  · Your symptoms.  · Your medical history.  · A physical exam. Your health care provider may measure:  ? Your BMI. If you are an  adult with a BMI between 25 and less than 30, you are considered overweight. If you are an adult with a BMI of 30 or higher, you are considered obese.  ? The distances around your hips and your waist (circumferences). These may be compared to each other to help diagnose your condition.  ? Your skinfold thickness. Your health care provider may gently pinch a fold of your skin and measure it.    How is this treated?  Treatment for this condition often includes changing your lifestyle. Treatment may include some or all of the following:  · Dietary changes. Work with your health care provider and a dietitian to set a weight-loss goal that is healthy and reasonable for you. Dietary changes may include eating:  ? Smaller portions. A portion size is the amount of a particular food that is healthy for you to eat at one time. This varies from person to person.  ? Low-calorie or low-fat options.  ? More whole grains, fruits, and vegetables.  · Regular physical activity. This may include aerobic activity (cardio) and strength training.  · Medicine to help you lose weight. Your health care provider may prescribe medicine if you are unable to lose 1 pound a week after 6 weeks of eating more healthily and doing more physical activity.  · Surgery. Surgical options may include gastric banding and gastric bypass. Surgery may be done if:  ? Other treatments have not helped to improve your condition.  ? You have a BMI of 40 or higher.  ? You have life-threatening health problems related to obesity.    Follow these instructions at home:    Eating and drinking    · Follow recommendations from your health care provider about what you eat and drink. Your health care provider may advise you to:  ? Limit fast foods, sweets, and processed snack foods.  ? Choose low-fat options, such as low-fat milk instead of whole milk.  ? Eat 5 or more servings of fruits or vegetables every day.  ? Eat at home more often. This gives you more control over  what you eat.  ? Choose healthy foods when you eat out.  ? Learn what a healthy portion size is.  ? Keep low-fat snacks on hand.  ? Avoid sugary drinks, such as soda, fruit juice, iced tea sweetened with sugar, and flavored milk.  ? Eat a healthy breakfast.  · Drink enough water to keep your urine clear or pale yellow.  · Do not go without eating for long periods of time (do not fast) or follow a fad diet. Fasting and fad diets can be unhealthy and even dangerous.  Physical Activity  · Exercise regularly, as told by your health care provider. Ask your health care provider what types of exercise are safe for you and how often you should exercise.  · Warm up and stretch before being active.  · Cool down and stretch after being active.  · Rest between periods of activity.  Lifestyle  · Limit the time that you spend in front of your TV, computer, or video game system.  · Find ways to reward yourself that do not involve food.  · Limit alcohol intake to no more than 1 drink a day for nonpregnant women and 2 drinks a day for men. One drink equals 12 oz of beer, 5 oz of wine, or 1½ oz of hard liquor.  General instructions  · Keep a weight loss journal to keep track of the food you eat and how much you exercise you get.  · Take over-the-counter and prescription medicines only as told by your health care provider.  · Take vitamins and supplements only as told by your health care provider.  · Consider joining a support group. Your health care provider may be able to recommend a support group.  · Keep all follow-up visits as told by your health care provider. This is important.  Contact a health care provider if:  · You are unable to meet your weight loss goal after 6 weeks of dietary and lifestyle changes.  This information is not intended to replace advice given to you by your health care provider. Make sure you discuss any questions you have with your health care provider.  Document Released: 01/25/2006 Document Revised:  05/22/2017 Document Reviewed: 10/05/2016  [employer] Interactive Patient Education © 2018 [employer]  MyPlate from [employer]  The general, healthful diet is based on the 2010 Dietary Guidelines for Americans. The amount of food you need to eat from each food group depends on your age, sex, and level of physical activity and can be individualized by a dietitian. Go to ChooseMyPlate.gov for more information.  What do I need to know about the MyPlate plan?  · Enjoy your food, but eat less.  · Avoid oversized portions.  ? ½ of your plate should include fruits and vegetables.  ? ¼ of your plate should be grains.  ? ¼ of your plate should be protein.  Grains  · Make at least half of your grains whole grains.  · For a 2,000 calorie daily food plan, eat 6 oz every day.  · 1 oz is about 1 slice bread, 1 cup cereal, or ½ cup cooked rice, cereal, or pasta.  Vegetables  · Make half your plate fruits and vegetables.  · For a 2,000 calorie daily food plan, eat 2½ cups every day.  · 1 cup is about 1 cup raw or cooked vegetables or vegetable juice or 2 cups raw leafy greens.  Fruits  · Make half your plate fruits and vegetables.  · For a 2,000 calorie daily food plan, eat 2 cups every day.  · 1 cup is about 1 cup fruit or 100% fruit juice or ½ cup dried fruit.  Protein  · For a 2,000 calorie daily food plan, eat 5½ oz every day.  · 1 oz is about 1 oz meat, poultry, or fish, ¼ cup cooked beans, 1 egg, 1 Tbsp peanut butter, or ½ oz nuts or seeds.  Dairy  · Switch to fat-free or low-fat (1%) milk.  · For a 2,000 calorie daily food plan, eat 3 cups every day.  · 1 cup is about 1 cup milk or yogurt or soy milk (soy beverage), 1½ oz natural cheese, or 2 oz processed cheese.  Fats, Oils, and Empty Calories  · Only small amounts of oils are recommended.  · Empty calories are calories from solid fats or added sugars.  · Compare sodium in foods like soup, bread, and frozen meals. Choose the foods with lower numbers.  · Drink water instead of  sugary drinks.  What foods can I eat?  Grains  Whole grains such as whole wheat, quinoa, millet, and bulgur. Bread, rolls, and pasta made from whole grains. Brown or wild rice. Hot or cold cereals made from whole grains and without added sugar.  Vegetables  All fresh vegetables, especially fresh red, dark green, or orange vegetables. Peas and beans. Low-sodium frozen or canned vegetables prepared without added salt. Low-sodium vegetable juices.  Fruits  All fresh, frozen, and dried fruits. Canned fruit packed in water or fruit juice without added sugar. Fruit juices without added sugar.  Meats and Other Protein Sources  Boiled, baked, or grilled lean meat trimmed of fat. Skinless poultry. Fresh seafood and shellfish. Canned seafood packed in water. Unsalted nuts and unsalted nut butters. Tofu. Dried beans and pea. Eggs.  Dairy  Low-fat or fat-free milk, yogurt, and cheeses.  Sweets and Desserts  Frozen desserts made from low-fat milk.  Fats and Oils  Olive, peanut, and canola oils and margarine. Salad dressing and mayonnaise made from these oils.  Other  Soups and casseroles made from allowed ingredients and without added fat or salt.  The items listed above may not be a complete list of recommended foods or beverages. Contact your dietitian for more options.  What foods are not recommended?  Grains  Sweetened, low-fiber cereals. Packaged baked goods. Snack crackers and chips. Cheese crackers, butter crackers, and biscuits. Frozen waffles, sweet breads, doughnuts, pastries, packaged baking mixes, pancakes, cakes, and cookies.  Vegetables  Regular canned or frozen vegetables or vegetables prepared with salt. Canned tomatoes. Canned tomato sauce. Fried vegetables. Vegetables in cream sauce or cheese sauce.  Fruits  Fruits packed in syrup or made with added sugar.  Meats and Other Protein Sources  Marbled or fatty meats such as ribs. Poultry with skin. Fried meats, poultry, eggs, or fish. Sausages, hot dogs, and deli  meats such as pastrami, bologna, or salami.  Dairy  Whole milk, cream, cheeses made from whole milk, sour cream. Ice cream or yogurt made from whole milk or with added sugar.  Beverages  For adults, no more than one alcoholic drink per day. Regular soft drinks or other sugary beverages. Juice drinks.  Sweets and Desserts  Sugary or fatty desserts, candy, and other sweets.  Fats and Oils  Solid shortening or partially hydrogenated oils. Solid margarine. Margarine that contains trans fats. Butter.  The items listed above may not be a complete list of foods and beverages to avoid. Contact your dietitian for more information.  This information is not intended to replace advice given to you by your health care provider. Make sure you discuss any questions you have with your health care provider.  Document Released: 01/06/2009 Document Revised: 05/25/2017 Document Reviewed: 11/26/2014  [employer] Interactive Patient Education © 2018 [employer]    Edema  Edema is an abnormal buildup of fluids in your body tissues. Edema is somewhat dependent on gravity to pull the fluid to the lowest place in your body. That makes the condition more common in the legs and thighs (lower extremities). Painless swelling of the feet and ankles is common and becomes more likely as you get older. It is also common in looser tissues, like around your eyes.  When the affected area is squeezed, the fluid may move out of that spot and leave a dent for a few moments. This dent is called pitting.  What are the causes?  There are many possible causes of edema. Eating too much salt and being on your feet or sitting for a long time can cause edema in your legs and ankles. Hot weather may make edema worse. Common medical causes of edema include:  · Heart failure.  · Liver disease.  · Kidney disease.  · Weak blood vessels in your legs.  · Cancer.  · An injury.  · Pregnancy.  · Some medications.  · Obesity.    What are the signs or symptoms?  Edema is  usually painless. Your skin may look swollen or shiny.  How is this diagnosed?  Your health care provider may be able to diagnose edema by asking about your medical history and doing a physical exam. You may need to have tests such as X-rays, an electrocardiogram, or blood tests to check for medical conditions that may cause edema.  How is this treated?  Edema treatment depends on the cause. If you have heart, liver, or kidney disease, you need the treatment appropriate for these conditions. General treatment may include:  · Elevation of the affected body part above the level of your heart.  · Compression of the affected body part. Pressure from elastic bandages or support stockings squeezes the tissues and forces fluid back into the blood vessels. This keeps fluid from entering the tissues.  · Restriction of fluid and salt intake.  · Use of a water pill (diuretic). These medications are appropriate only for some types of edema. They pull fluid out of your body and make you urinate more often. This gets rid of fluid and reduces swelling, but diuretics can have side effects. Only use diuretics as directed by your health care provider.    Follow these instructions at home:  · Keep the affected body part above the level of your heart when you are lying down.  · Do not sit still or stand for prolonged periods.  · Do not put anything directly under your knees when lying down.  · Do not wear constricting clothing or garters on your upper legs.  · Exercise your legs to work the fluid back into your blood vessels. This may help the swelling go down.  · Wear elastic bandages or support stockings to reduce ankle swelling as directed by your health care provider.  · Eat a low-salt diet to reduce fluid if your health care provider recommends it.  · Only take medicines as directed by your health care provider.  Contact a health care provider if:  · Your edema is not responding to treatment.  · You have heart, liver, or kidney  "disease and notice symptoms of edema.  · You have edema in your legs that does not improve after elevating them.  · You have sudden and unexplained weight gain.  Get help right away if:  · You develop shortness of breath or chest pain.  · You cannot breathe when you lie down.  · You develop pain, redness, or warmth in the swollen areas.  · You have heart, liver, or kidney disease and suddenly get edema.  · You have a fever and your symptoms suddenly get worse.  This information is not intended to replace advice given to you by your health care provider. Make sure you discuss any questions you have with your health care provider.  Document Released: 12/18/2006 Document Revised: 05/25/2017 Document Reviewed: 10/10/2014  [employer] Interactive Patient Education © 2017 [employer]    Hypertension  Hypertension, commonly called high blood pressure, is when the force of blood pumping through the arteries is too strong. The arteries are the blood vessels that carry blood from the heart throughout the body. Hypertension forces the heart to work harder to pump blood and may cause arteries to become narrow or stiff. Having untreated or uncontrolled hypertension can cause heart attacks, strokes, kidney disease, and other problems.  A blood pressure reading consists of a higher number over a lower number. Ideally, your blood pressure should be below 120/80. The first (\"top\") number is called the systolic pressure. It is a measure of the pressure in your arteries as your heart beats. The second (\"bottom\") number is called the diastolic pressure. It is a measure of the pressure in your arteries as the heart relaxes.  What are the causes?  The cause of this condition is not known.  What increases the risk?  Some risk factors for high blood pressure are under your control. Others are not.  Factors you can change  · Smoking.  · Having type 2 diabetes mellitus, high cholesterol, or both.  · Not getting enough exercise or physical " activity.  · Being overweight.  · Having too much fat, sugar, calories, or salt (sodium) in your diet.  · Drinking too much alcohol.  Factors that are difficult or impossible to change  · Having chronic kidney disease.  · Having a family history of high blood pressure.  · Age. Risk increases with age.  · Race. You may be at higher risk if you are -American.  · Gender. Men are at higher risk than women before age 45. After age 65, women are at higher risk than men.  · Having obstructive sleep apnea.  · Stress.  What are the signs or symptoms?  Extremely high blood pressure (hypertensive crisis) may cause:  · Headache.  · Anxiety.  · Shortness of breath.  · Nosebleed.  · Nausea and vomiting.  · Severe chest pain.  · Jerky movements you cannot control (seizures).    How is this diagnosed?  This condition is diagnosed by measuring your blood pressure while you are seated, with your arm resting on a surface. The cuff of the blood pressure monitor will be placed directly against the skin of your upper arm at the level of your heart. It should be measured at least twice using the same arm. Certain conditions can cause a difference in blood pressure between your right and left arms.  Certain factors can cause blood pressure readings to be lower or higher than normal (elevated) for a short period of time:  · When your blood pressure is higher when you are in a health care provider's office than when you are at home, this is called white coat hypertension. Most people with this condition do not need medicines.  · When your blood pressure is higher at home than when you are in a health care provider's office, this is called masked hypertension. Most people with this condition may need medicines to control blood pressure.    If you have a high blood pressure reading during one visit or you have normal blood pressure with other risk factors:  · You may be asked to return on a different day to have your blood pressure  checked again.  · You may be asked to monitor your blood pressure at home for 1 week or longer.    If you are diagnosed with hypertension, you may have other blood or imaging tests to help your health care provider understand your overall risk for other conditions.  How is this treated?  This condition is treated by making healthy lifestyle changes, such as eating healthy foods, exercising more, and reducing your alcohol intake. Your health care provider may prescribe medicine if lifestyle changes are not enough to get your blood pressure under control, and if:  · Your systolic blood pressure is above 130.  · Your diastolic blood pressure is above 80.    Your personal target blood pressure may vary depending on your medical conditions, your age, and other factors.  Follow these instructions at home:  Eating and drinking  · Eat a diet that is high in fiber and potassium, and low in sodium, added sugar, and fat. An example eating plan is called the DASH (Dietary Approaches to Stop Hypertension) diet. To eat this way:  ? Eat plenty of fresh fruits and vegetables. Try to fill half of your plate at each meal with fruits and vegetables.  ? Eat whole grains, such as whole wheat pasta, brown rice, or whole grain bread. Fill about one quarter of your plate with whole grains.  ? Eat or drink low-fat dairy products, such as skim milk or low-fat yogurt.  ? Avoid fatty cuts of meat, processed or cured meats, and poultry with skin. Fill about one quarter of your plate with lean proteins, such as fish, chicken without skin, beans, eggs, and tofu.  ? Avoid premade and processed foods. These tend to be higher in sodium, added sugar, and fat.  · Reduce your daily sodium intake. Most people with hypertension should eat less than 1,500 mg of sodium a day.  · Limit alcohol intake to no more than 1 drink a day for nonpregnant women and 2 drinks a day for men. One drink equals 12 oz of beer, 5 oz of wine, or 1½ oz of hard  liquor.  Lifestyle  · Work with your health care provider to maintain a healthy body weight or to lose weight. Ask what an ideal weight is for you.  · Get at least 30 minutes of exercise that causes your heart to beat faster (aerobic exercise) most days of the week. Activities may include walking, swimming, or biking.  · Include exercise to strengthen your muscles (resistance exercise), such as pilates or lifting weights, as part of your weekly exercise routine. Try to do these types of exercises for 30 minutes at least 3 days a week.  · Do not use any products that contain nicotine or tobacco, such as cigarettes and e-cigarettes. If you need help quitting, ask your health care provider.  · Monitor your blood pressure at home as told by your health care provider.  · Keep all follow-up visits as told by your health care provider. This is important.  Medicines  · Take over-the-counter and prescription medicines only as told by your health care provider. Follow directions carefully. Blood pressure medicines must be taken as prescribed.  · Do not skip doses of blood pressure medicine. Doing this puts you at risk for problems and can make the medicine less effective.  · Ask your health care provider about side effects or reactions to medicines that you should watch for.  Contact a health care provider if:  · You think you are having a reaction to a medicine you are taking.  · You have headaches that keep coming back (recurring).  · You feel dizzy.  · You have swelling in your ankles.  · You have trouble with your vision.  Get help right away if:  · You develop a severe headache or confusion.  · You have unusual weakness or numbness.  · You feel faint.  · You have severe pain in your chest or abdomen.  · You vomit repeatedly.  · You have trouble breathing.  Summary  · Hypertension is when the force of blood pumping through your arteries is too strong. If this condition is not controlled, it may put you at risk for serious  complications.  · Your personal target blood pressure may vary depending on your medical conditions, your age, and other factors. For most people, a normal blood pressure is less than 120/80.  · Hypertension is treated with lifestyle changes, medicines, or a combination of both. Lifestyle changes include weight loss, eating a healthy, low-sodium diet, exercising more, and limiting alcohol.  This information is not intended to replace advice given to you by your health care provider. Make sure you discuss any questions you have with your health care provider.  Document Released: 12/18/2006 Document Revised: 11/15/2017 Document Reviewed: 11/15/2017  [employer] Interactive Patient Education © 2018 [employer].

## 2019-02-12 ENCOUNTER — TELEPHONE (OUTPATIENT)
Dept: CARDIOLOGY | Facility: CLINIC | Age: 64
End: 2019-02-12

## 2019-02-12 NOTE — TELEPHONE ENCOUNTER
Per chart review, pt was last seen on 8/15/18. Scheduled to follow up on 2/20/19.         First attempt to reach pt. Left a voicemail for pt to return my call at [phone], asked if okay to leave a detailed message.

## 2019-02-12 NOTE — TELEPHONE ENCOUNTER
----- Message from Hallie Burnett MA sent at 2/12/2019  2:29 PM EST -----  Pt called req nurse to call back, left no further information. -[facility]

## 2019-02-13 ENCOUNTER — TELEPHONE (OUTPATIENT)
Dept: CARDIOLOGY | Facility: CLINIC | Age: 64
End: 2019-02-13

## 2019-02-13 DIAGNOSIS — R09.89 BILATERAL CAROTID BRUITS: ICD-10-CM

## 2019-02-13 DIAGNOSIS — I65.23 BILATERAL CAROTID ARTERY STENOSIS: Primary | ICD-10-CM

## 2019-02-13 NOTE — TELEPHONE ENCOUNTER
Patients daughter called wanting us to know her mother, Barby saw Optho yesterday, they immediatly sent her to Dr. Chambers. Dr. Chambers then sent her to [facility] for carotid u/s. Patients daughter wanted us to be aware so we could look at it and advise if anything emergent was needed.   Carotid u/s results were retrieved, will discuss with OSCAR Rankin.   Per verbal order OSCAR Rankin pt will need to have a Carotid CTA and BMP prior to scan. We do not have patient allergic to Contrast, pt is not on Metformin.   Called patient and notified her of new verbal orders. Patient denies any allergy to contrast, denied being on Metformin. Patient verbalized understanding and had no further questions at this time. -TOMMY;CHRIST

## 2019-02-13 NOTE — TELEPHONE ENCOUNTER
Orders were placed within chart for CTA carotids. When BRE Eaton called [facility] to schedule it, they notified her that Dr. Chambers had already called and scheduled her for 02/20/2019 @ 0730 AM. Notified patient as well as provider OSCAR Rankin. -[facility]

## 2019-02-20 ENCOUNTER — OFFICE VISIT (OUTPATIENT)
Dept: CARDIOLOGY | Facility: CLINIC | Age: 64
End: 2019-02-20

## 2019-02-20 VITALS
WEIGHT: 194.6 LBS | HEART RATE: 97 BPM | SYSTOLIC BLOOD PRESSURE: 145 MMHG | DIASTOLIC BLOOD PRESSURE: 88 MMHG | BODY MASS INDEX: 36.74 KG/M2 | HEIGHT: 61 IN | OXYGEN SATURATION: 94 %

## 2019-02-20 DIAGNOSIS — I95.1 ORTHOSTATIC HYPOTENSION: ICD-10-CM

## 2019-02-20 DIAGNOSIS — E78.00 HYPERCHOLESTEROLEMIA: ICD-10-CM

## 2019-02-20 DIAGNOSIS — F17.200 SMOKING: ICD-10-CM

## 2019-02-20 DIAGNOSIS — I73.9 SUBCLAVIAN ARTERY DISEASE (HCC): ICD-10-CM

## 2019-02-20 DIAGNOSIS — R09.89 BILATERAL CAROTID BRUITS: ICD-10-CM

## 2019-02-20 DIAGNOSIS — Z00.00 HEALTHCARE MAINTENANCE: ICD-10-CM

## 2019-02-20 DIAGNOSIS — I49.3 PVC (PREMATURE VENTRICULAR CONTRACTION): ICD-10-CM

## 2019-02-20 DIAGNOSIS — J44.9 CHRONIC OBSTRUCTIVE PULMONARY DISEASE, UNSPECIFIED COPD TYPE (HCC): ICD-10-CM

## 2019-02-20 DIAGNOSIS — I73.9 PERIPHERAL VASCULAR DISEASE (HCC): ICD-10-CM

## 2019-02-20 DIAGNOSIS — I65.02 OCCLUSION OF LEFT VERTEBRAL ARTERY: ICD-10-CM

## 2019-02-20 DIAGNOSIS — R06.02 SHORTNESS OF BREATH: ICD-10-CM

## 2019-02-20 DIAGNOSIS — R07.2 PRECORDIAL PAIN: Primary | ICD-10-CM

## 2019-02-20 DIAGNOSIS — R00.2 PALPITATIONS: ICD-10-CM

## 2019-02-20 DIAGNOSIS — E78.5 DYSLIPIDEMIA: ICD-10-CM

## 2019-02-20 DIAGNOSIS — I10 ESSENTIAL HYPERTENSION: ICD-10-CM

## 2019-02-20 DIAGNOSIS — I25.10 CAD IN NATIVE ARTERY: ICD-10-CM

## 2019-02-20 PROCEDURE — 99214 OFFICE O/P EST MOD 30 MIN: CPT | Performed by: NURSE PRACTITIONER

## 2019-02-20 PROCEDURE — 93000 ELECTROCARDIOGRAM COMPLETE: CPT | Performed by: NURSE PRACTITIONER

## 2019-02-20 RX ORDER — NITROGLYCERIN 0.4 MG/1
0.4 TABLET SUBLINGUAL
Qty: 25 TABLET | Refills: 5 | Status: SHIPPED | OUTPATIENT
Start: 2019-02-20 | End: 2020-08-11

## 2019-02-20 RX ORDER — BUPROPION HYDROCHLORIDE 150 MG/1
150 TABLET ORAL DAILY
Qty: 90 TABLET | Refills: 3 | Status: SHIPPED | OUTPATIENT
Start: 2019-02-20 | End: 2020-06-25

## 2019-02-20 NOTE — PROGRESS NOTES
Subjective   Barby Sue is a 63 y.o. female     Chief Complaint   Patient presents with   • Follow-up     Pt in office for 6 month follow up    • Hypertension       HPI    Problem List:    1. Coronary artery disease  1.1 Nonobstructive per catheterization, March 2012.  1.2 Follow up stress August 2015 demonstrated chest wall attenuation but no sign of ischemia, preserved ER  1.3 Stress Test 12/14/16 - no ischemia; low risk  2. PVD with history of aortobifemoral bypass 2014  2.1 BLE Arterial U/S 12/14/16 - < or equal to 50% right common femoral artery; high-grade stenosis left posterior tibial artery 50-75% stenosis  2.2 CTA Abd with runoff 5/16/17 - patent aorta bi-iliac graft, mild narrowing seen at prox aspect of the celiac trunk.  No high grade stenosis. Fatty infiltration of the liver.   3. Chronic hypotension   4. Hyperlipidemia   5. Carotid Artery Disease   5.1 Carotid Artery U/S 12/14/16 - 50-75% right external carotid; 16-49% KENNEDY and LICA; antegrade flow both vertebral arteries   5.2 Carotid Artery US 2/13/19 - LICA 50-69%; less than 50% on the right   5.3 CTA head 2/15/19 - left external carotid artery is occluded, no sig stenosis KENNEDY; left vertebral artery at arch is near occluded; enlarged precarinal node 1.4 x 1.5 and right of the midline measuring 1.1 x 1.6, emphysema   6. Echo 12/14/16 - mild LVH; EF > 65%; DD I; trace MR; physiological TR; mild UT  7. COPD/Emphysema   8. Palpitations  8.1 Event Monitor 3/31-4/11/17 - NSR - ST    Patient is a 63-year-old female who presents today for follow-up with her daughter at her side.  She says about 2-3 months ago she was having a bunch of hurting midsternum that felt like squeezing.  She says it mostly occurs at night.  She said she had it for about 3-4 nights in a row and had to take 3 nitroglycerin with each episode.  She said that she did get short of breath and diaphoretic when they occurred.  She says she has palpitations when she lays down every night  feels sometimes like racing and then it feels like it stopped and then starts.  She did have PVCs today sign increasing her beta blocker.  She denies any dizziness, presyncope, syncope or PND.  She says she does have occasional lightheadedness in the evening and at random.  She says she does wake up short of breath at times.  She says she has swelling in her feet almost every day since she takes her water pill on a daily basis.  She says she is always short of breath regardless that she is active or resting.  She says her fatigue has been much worse since her episodes of chest pain.    We went over the CTA of the neck that was ordered by her eye doctor.  She is going to see PCP tomorrow regarding enlarged lymph nodes.    Current Outpatient Medications   Medication Sig Dispense Refill   • aspirin 81 MG EC tablet Take 1 tablet by mouth daily.     • Biotin 1000 MCG tablet Take 1 tablet by mouth daily.     • BLACK COHOSH PO Take 1,000 mg by mouth daily.     • CloNIDine (CATAPRES) 0.1 MG tablet Take 1 tablet by mouth Every 12 (Twelve) Hours. 180 tablet 3   • cyclobenzaprine (FLEXERIL) 10 MG tablet Take 1 tablet by mouth 3 (three) times a day.     • fludrocortisone 0.1 MG tablet Take 1 tablet by mouth 3 (Three) Times a Week. Take on Monday, Wednesday, and Friday. 90 tablet 3   • furosemide (LASIX) 20 MG tablet Take 1 tablet by mouth Daily. 90 tablet 3   • gabapentin (NEURONTIN) 400 MG capsule Take 400 mg by mouth 3 (Three) Times a Day.     • isosorbide mononitrate (IMDUR) 30 MG 24 hr tablet Take 1 tablet by mouth Daily. 90 tablet 3   • losartan (COZAAR) 100 MG tablet Take 1 tablet by mouth Daily. 90 tablet 3   • metoprolol tartrate (LOPRESSOR) 25 MG tablet Take 1 tablet by mouth Every 12 (Twelve) Hours. 180 tablet 3   • midodrine (PROAMATINE) 2.5 MG tablet Take 1 tablet by mouth 3 (Three) Times a Day. 270 tablet 3   • nitroglycerin (NITROSTAT) 0.4 MG SL tablet Place 1 tablet under the tongue Every 5 (Five) Minutes As  Needed for Chest Pain. Only take up to 3 tablets. Call 911 if pain persists. 25 tablet 5   • oxyCODONE-acetaminophen (PERCOCET)  MG per tablet Take 1 tablet by mouth every 8 (eight) hours as needed for moderate pain (4-6).     • pantoprazole (PROTONIX) 40 MG EC tablet Take 1 tablet by mouth Daily. 90 tablet 3   • potassium chloride (MICRO-K) 10 MEQ CR capsule Take 1 capsule by mouth Daily. 90 capsule 3   • rosuvastatin (CRESTOR) 40 MG tablet Take 1 tablet by mouth Daily. 90 tablet 3   • Umeclidinium Bromide 62.5 MCG/INH aerosol powder  Inhale 1 puff Daily. 90 each 3   • vitamin D (ERGOCALCIFEROL) 85294 UNITS capsule capsule 1 (One) Time Per Week.     • buPROPion XL (WELLBUTRIN XL) 150 MG 24 hr tablet Take 1 tablet by mouth Daily. 90 tablet 3     No current facility-administered medications for this visit.        ALLERGIES    Lisinopril; Adhesive tape; Ibuprofen; and Latex    Past Medical History:   Diagnosis Date   • Anxiety    • Back pain    • Carotid bruit    • Chest pain    • COPD (chronic obstructive pulmonary disease) (CMS/HCC)    • Dyslipidemia    • Fainting    • Functional murmur    • GERD (gastroesophageal reflux disease)    • Hypercholesterolemia    • Hyperlipidemia    • Hypertension    • Hypokalemia    • Orthostatic hypotension    • Orthostatic hypotension    • Peripheral vascular disease (CMS/HCC)    • Peripheral vascular disease (CMS/HCC)     PVD with history of aortobifemoral bypass   • Shortness of breath    • Sleep apnea        Social History     Socioeconomic History   • Marital status:      Spouse name: Not on file   • Number of children: Not on file   • Years of education: Not on file   • Highest education level: Not on file   Social Needs   • Financial resource strain: Not on file   • Food insecurity - worry: Not on file   • Food insecurity - inability: Not on file   • Transportation needs - medical: Not on file   • Transportation needs - non-medical: Not on file   Occupational  History   • Not on file   Tobacco Use   • Smoking status: Former Smoker   • Smokeless tobacco: Never Used   Substance and Sexual Activity   • Alcohol use: No   • Drug use: No   • Sexual activity: Defer   Other Topics Concern   • Not on file   Social History Narrative   • Not on file       Family History   Problem Relation Age of Onset   • Heart failure Mother         Congestive   • Hypertension Mother    • Heart failure Sister         Congestive   • Coronary artery disease Sister    • Heart attack Sister         Acute   • Hypertension Sister    • Sudden death Sister    • Coronary artery disease Brother    • Seizures Brother    • Hypertension Brother    • Heart failure Maternal Grandmother         Congestive       Review of Systems   Constitutional: Positive for diaphoresis (with CP ) and fatigue (worse after she had her episodes of chest pain ).   HENT: Positive for congestion (States she's been congested for a long time ) and rhinorrhea (clear). Negative for sore throat.    Eyes: Positive for visual disturbance (glasses daily ).   Respiratory: Positive for shortness of breath (always regardless of being active or resting; with CP ). Negative for chest tightness.    Cardiovascular: Positive for chest pain (about 2-3 mos hurting midsternum squeezing; had them at night and took 3 nitro to resolve; 3-4 nights ), palpitations (when she lays down every night; heart races; stops for a min and starts back ) and leg swelling (BLE edema in feet; water pill takes it off ).   Gastrointestinal: Negative for abdominal pain, blood in stool, constipation, diarrhea, nausea and vomiting.   Endocrine: Positive for cold intolerance (back and forth ) and heat intolerance (back and forth ).   Genitourinary: Negative for difficulty urinating, dysuria, frequency, hematuria and urgency.   Musculoskeletal: Positive for arthralgias, back pain and neck pain.   Skin: Negative for rash and wound.   Allergic/Immunologic: Negative for  "environmental allergies and food allergies.   Neurological: Positive for light-headedness (randomly happens in the evening ) and headaches (when having chest pain, but they are better ). Negative for dizziness, syncope, weakness and numbness.   Hematological: Bruises/bleeds easily.   Psychiatric/Behavioral: Positive for sleep disturbance (Pt states she has SoA at HS ).       Objective   /88   Pulse 97   Ht 154.9 cm (61\")   Wt 88.3 kg (194 lb 9.6 oz)   SpO2 94%   BMI 36.77 kg/m²   Vitals:    02/20/19 1530   BP: 145/88   Pulse: 97   SpO2: 94%   Weight: 88.3 kg (194 lb 9.6 oz)   Height: 154.9 cm (61\")      Lab Results (most recent)     None        Physical Exam   Constitutional: She is oriented to person, place, and time. Vital signs are normal. She appears well-developed and well-nourished. She is active and cooperative.   HENT:   Head: Normocephalic.   Mouth/Throat: She has dentures (upper and lower ).   Eyes: Lids are normal.   Wears glasses    Cardiovascular: Normal rate, regular rhythm and normal heart sounds.   Pulses:       Radial pulses are 2+ on the right side, and 2+ on the left side.        Dorsalis pedis pulses are 2+ on the right side, and 2+ on the left side.        Posterior tibial pulses are 2+ on the right side, and 2+ on the left side.   Trace - 1+ edema BLE.    Pulmonary/Chest: Effort normal. She has decreased breath sounds in the right lower field and the left lower field.   Abdominal: Normal appearance and bowel sounds are normal.   Neurological: She is alert and oriented to person, place, and time.   Skin: Skin is warm, dry and intact.   Psychiatric: She has a normal mood and affect. Her speech is normal and behavior is normal. Judgment and thought content normal. Cognition and memory are normal.       Procedure     ECG 12 Lead  Date/Time: 2/20/2019 5:24 PM  Performed by: Felicita Robledo APRN  Authorized by: Felicita Robledo APRN   Comparison: compared with previous ECG from " 6/19/2018  Rhythm: sinus rhythm  Ectopy: infrequent PVCs  Rate: normal  BPM: 91  QRS axis: normal  Other findings: T wave abnormality    Clinical impression: non-specific ECG                 Assessment/Plan      Diagnosis Plan   1. Precordial pain  nitroglycerin (NITROSTAT) 0.4 MG SL tablet    Stress Test With Myocardial Perfusion One Day    Adult Transthoracic Echo Complete W/ Cont if Necessary Per Protocol   2. CAD in native artery  nitroglycerin (NITROSTAT) 0.4 MG SL tablet    Stress Test With Myocardial Perfusion One Day    Adult Transthoracic Echo Complete W/ Cont if Necessary Per Protocol    metoprolol tartrate (LOPRESSOR) 25 MG tablet   3. Essential hypertension  Comprehensive Metabolic Panel    Stress Test With Myocardial Perfusion One Day    Adult Transthoracic Echo Complete W/ Cont if Necessary Per Protocol    metoprolol tartrate (LOPRESSOR) 25 MG tablet   4. Bilateral carotid bruits  Stress Test With Myocardial Perfusion One Day    Adult Transthoracic Echo Complete W/ Cont if Necessary Per Protocol   5. Hypercholesterolemia  Stress Test With Myocardial Perfusion One Day    Adult Transthoracic Echo Complete W/ Cont if Necessary Per Protocol    Magnesium   6. Orthostatic hypotension  Stress Test With Myocardial Perfusion One Day    Adult Transthoracic Echo Complete W/ Cont if Necessary Per Protocol   7. Peripheral vascular disease (CMS/HCC)  Stress Test With Myocardial Perfusion One Day    Adult Transthoracic Echo Complete W/ Cont if Necessary Per Protocol   8. Shortness of breath  Umeclidinium Bromide 62.5 MCG/INH aerosol powder     Stress Test With Myocardial Perfusion One Day    Adult Transthoracic Echo Complete W/ Cont if Necessary Per Protocol   9. Dyslipidemia  Lipid Panel    Stress Test With Myocardial Perfusion One Day    Adult Transthoracic Echo Complete W/ Cont if Necessary Per Protocol   10. Subclavian artery disease (CMS/HCC)  Stress Test With Myocardial Perfusion One Day    Adult Transthoracic  Echo Complete W/ Cont if Necessary Per Protocol   11. Smoking  buPROPion XL (WELLBUTRIN XL) 150 MG 24 hr tablet    Stress Test With Myocardial Perfusion One Day    Adult Transthoracic Echo Complete W/ Cont if Necessary Per Protocol   12. Chronic obstructive pulmonary disease, unspecified COPD type (CMS/HCC)  Umeclidinium Bromide 62.5 MCG/INH aerosol powder     Stress Test With Myocardial Perfusion One Day    Adult Transthoracic Echo Complete W/ Cont if Necessary Per Protocol   13. Healthcare maintenance  Lipid Panel    Comprehensive Metabolic Panel    TSH    Magnesium   14. Palpitations  TSH   15. PVC (premature ventricular contraction)  TSH   16. Occlusion of left vertebral artery  Ambulatory Referral to Cardiac Electrophysiology       Return in about 9 weeks (around 4/24/2019).    Pain/CAD/hypertension/hyperlipidemia/shortness of breath-patient will have ischemia workup, stress and echo.  Palpitations/PVCs-patient will increase her metoprolol.  She will use nitroglycerin when necessary for chest pain no resolution she will go to the ER. Vertebral artery disease-patient will be referred to neuroradiology at .  Will get a lipid, CMP, TSH and magnesium.  She will continue her medication regimen.  She will follow-up in 9 weeks or sooner if any changes.  Smoking- will start Wellbutrin.       Patient's Body mass index is 36.77 kg/m². BMI is above normal parameters. Recommendations include: educational material.    I advised Barby of the risks of continuing to use tobacco, and I provided her with tobacco cessation educational materials in the After Visit Summary.     During this visit, I spent <3 minutes counseling the patient regarding tobacco cessation.      Electronically signed by:

## 2019-02-20 NOTE — PATIENT INSTRUCTIONS
"Fat and Cholesterol Restricted Diet  Getting too much fat and cholesterol in your diet may cause health problems. Following this diet helps keep your fat and cholesterol at normal levels. This can keep you from getting sick.  What types of fat should I choose?  · Choose monosaturated and polyunsaturated fats. These are found in foods such as olive oil, canola oil, flaxseeds, walnuts, almonds, and seeds.  · Eat more omega-3 fats. Good choices include salmon, mackerel, sardines, tuna, flaxseed oil, and ground flaxseeds.  · Limit saturated fats. These are in animal products such as meats, butter, and cream. They can also be in plant products such as palm oil, palm kernel oil, and coconut oil.  · Avoid foods with partially hydrogenated oils in them. These contain trans fats. Examples of foods that have trans fats are stick margarine, some tub margarines, cookies, crackers, and other baked goods.  What general guidelines do I need to follow?  · Check food labels. Look for the words \"trans fat\" and \"saturated fat.\"  · When preparing a meal:  ? Fill half of your plate with vegetables and green salads.  ? Fill one fourth of your plate with whole grains. Look for the word \"whole\" as the first word in the ingredient list.  ? Fill one fourth of your plate with lean protein foods.  · Eat more foods that have fiber, like apples, carrots, beans, peas, and barley.  · Eat more home-cooked foods. Eat less at restaurants and buffets.  · Limit or avoid alcohol.  · Limit foods high in starch and sugar.  · Limit fried foods.  · Cook foods without frying them. Baking, boiling, grilling, and broiling are all great options.  · Lose weight if you are overweight. Losing even a small amount of weight can help your overall health. It can also help prevent diseases such as diabetes and heart disease.  What foods can I eat?  Grains  Whole grains, such as whole wheat or whole grain breads, crackers, cereals, and pasta. Unsweetened oatmeal, " bulgur, barley, quinoa, or brown rice. Corn or whole wheat flour tortillas.  Vegetables  Fresh or frozen vegetables (raw, steamed, roasted, or grilled). Green salads.  Fruits  All fresh, canned (in natural juice), or frozen fruits.  Meat and Other Protein Products  Ground beef (85% or leaner), grass-fed beef, or beef trimmed of fat. Skinless chicken or turkey. Ground chicken or turkey. Pork trimmed of fat. All fish and seafood. Eggs. Dried beans, peas, or lentils. Unsalted nuts or seeds. Unsalted canned or dry beans.  Dairy  Low-fat dairy products, such as skim or 1% milk, 2% or reduced-fat cheeses, low-fat ricotta or cottage cheese, or plain low-fat yogurt.  Fats and Oils  Tub margarines without trans fats. Light or reduced-fat mayonnaise and salad dressings. Avocado. Olive, canola, sesame, or safflower oils. Natural peanut or almond butter (choose ones without added sugar and oil).  The items listed above may not be a complete list of recommended foods or beverages. Contact your dietitian for more options.  What foods are not recommended?  Grains  White bread. White pasta. White rice. Cornbread. Bagels, pastries, and croissants. Crackers that contain trans fat.  Vegetables  White potatoes. Corn. Creamed or fried vegetables. Vegetables in a cheese sauce.  Fruits  Dried fruits. Canned fruit in light or heavy syrup. Fruit juice.  Meat and Other Protein Products  Fatty cuts of meat. Ribs, chicken wings, saenz, sausage, bologna, salami, chitterlings, fatback, hot dogs, bratwurst, and packaged luncheon meats. Liver and organ meats.  Dairy  Whole or 2% milk, cream, half-and-half, and cream cheese. Whole milk cheeses. Whole-fat or sweetened yogurt. Full-fat cheeses. Nondairy creamers and whipped toppings. Processed cheese, cheese spreads, or cheese curds.  Sweets and Desserts  Corn syrup, sugars, honey, and molasses. Candy. Jam and jelly. Syrup. Sweetened cereals. Cookies, pies, cakes, donuts, muffins, and ice  cream.  Fats and Oils  Butter, stick margarine, lard, shortening, ghee, or saenz fat. Coconut, palm kernel, or palm oils.  Beverages  Alcohol. Sweetened drinks (such as sodas, lemonade, and fruit drinks or punches).  The items listed above may not be a complete list of foods and beverages to avoid. Contact your dietitian for more information.  This information is not intended to replace advice given to you by your health care provider. Make sure you discuss any questions you have with your health care provider.  Document Released: 06/18/2013 Document Revised: 08/24/2017 Document Reviewed: 03/19/2015  [employer] Interactive Patient Education © 2018 [employer]  BMI for Adults  Body mass index (BMI) is a number that is calculated from a person's weight and height. In most adults, the number is used to find how much of an adult's weight is made up of fat. BMI is not as accurate as a direct measure of body fat.  How is BMI calculated?  BMI is calculated by dividing weight in kilograms by height in meters squared. It can also be calculated by dividing weight in pounds by height in inches squared, then multiplying the resulting number by 703. Charts are available to help you find your BMI quickly and easily without doing this calculation.  How is BMI interpreted?  Health care professionals use BMI charts to identify whether an adult is underweight, at a normal weight, or overweight based on the following guidelines:  · Underweight: BMI less than 18.5.  · Normal weight: BMI between 18.5 and 24.9.  · Overweight: BMI between 25 and 29.9.  · Obese: BMI of 30 and above.    BMI is usually interpreted the same for males and females.  Weight includes both fat and muscle, so someone with a muscular build, such as an athlete, may have a BMI that is higher than 24.9. In cases like these, BMI may not accurately depict body fat. To determine if excess body fat is the cause of a BMI of 25 or higher, further assessments may need to be  done by a health care provider.  Why is BMI a useful tool?  BMI is used to identify a possible weight problem that may be related to a medical problem or may increase the risk for medical problems. BMI can also be used to promote changes to reach a healthy weight.  This information is not intended to replace advice given to you by your health care provider. Make sure you discuss any questions you have with your health care provider.  Document Released: 08/29/2005 Document Revised: 04/27/2017 Document Reviewed: 05/15/2015  [employer] Interactive Patient Education © 2018 [employer]    Premature Ventricular Contraction  A premature ventricular contraction (PVC) is a common irregularity in the normal heart rhythm. These contractions are extra heartbeats that start in the heart ventricles and occur too early in the normal sequence. During the PVC, the heart's normal electrical pathway is not used, so the beat is shorter and less effective. In most cases, these contractions come and go and do not require treatment.  What are the causes?  In many cases, the cause may not be known. Common causes of the condition include:  · Smoking.  · Drinking alcohol.  · Caffeine.  · Certain medicines.  · Some illegal drugs.  · Stress.    Certain medical conditions can also cause PVCs:  · Changes in minerals in the blood (electrolytes).  · Heart failure.  · Heart valve problems.  · Low blood oxygen levels or high carbon dioxide levels.  · Heart attack, or coronary artery disease.    What are the signs or symptoms?  The main symptom of this condition is a fast or skipped heartbeat (palpitations). Other symptoms include:  · Chest pain.  · Shortness of breath.  · Feeling tired.  · Dizziness.    In some cases, there are no symptoms.  How is this diagnosed?  This condition may be diagnosed based on:  · Your medical history.  · A physical exam. During the exam, the health care provider will check for irregular heartbeats.  · Tests, such  as:  ? An ECG (electrocardiogram) to monitor the electrical activity of your heart.  ? Holter monitor testing. This involves wearing a device that clips to your clothing and monitors the electrical activity of your heart over longer periods of time.  ? Stress tests to see how exercise affects your heart rhythm and blood supply.  ? Echocardiogram. This test uses sound waves (ultrasound) to produce an image of your heart.  ? Electrophysiology study. This test checks the electric pathways in your heart.    How is this treated?  Treatment depends on any underlying conditions, the type of PVCs that you are having, and how much the symptoms are interfering with your daily life.  Possible treatments include:  · Avoiding things that can trigger the premature contractions, such as caffeine or alcohol.  · Medicines. These may be given if symptoms are severe or if the extra heartbeats are frequent.  · Treatment for any underlying condition that is found to be the cause of the contractions.  · Catheter ablation. This procedure destroys the heart tissues that send abnormal signals.    In some cases, no treatment is required.  Follow these instructions at home:  Lifestyle  Follow these instructions as told by your health care provider:  · Do not use any products that contain nicotine or tobacco, such as cigarettes and e-cigarettes. If you need help quitting, ask your health care provider.  · If caffeine triggers episodes of PVC, do not eat, drink, or use anything with caffeine in it.  · If caffeine does not seem to trigger episodes, consume caffeine in moderation.  · If alcohol triggers episodes of PVC, do not drink alcohol.  · If alcohol does not seem to trigger episodes, limit alcohol intake to no more than 1 drink a day for nonpregnant women and 2 drinks a day for men. One drink equals 12 oz of beer, 5 oz of wine, or 1½ oz of hard liquor.  · Exercise regularly. Ask your health care provider what type of exercise is safe for  you.  · Find healthy ways to manage stress. Avoid stressful situations when possible.  · Try to get at least 7-9 hours of sleep each night, or as much as recommended by your health care provider.  · Do not use illegal drugs.    General instructions  · Take over-the-counter and prescription medicines only as told by your health care provider.  · Keep all follow-up visits as told by your health care provider. This is important.  Get help right away if:  · You feel palpitations that are frequent or continual.  · You have chest pain.  · You have shortness of breath.  · You have sweating for no reason.  · You have nausea and vomiting.  · You become light-headed or you faint.  This information is not intended to replace advice given to you by your health care provider. Make sure you discuss any questions you have with your health care provider.  Document Released: 08/04/2005 Document Revised: 08/11/2017 Document Reviewed: 05/24/2017  [employer] Interactive Patient Education © 2018 [employer]    Vertebrobasilar Disease  Vertebrobasilar disease is a condition in which too little blood reaches the back of the brain. Vertebrobasilar disease can cause symptoms that interfere with day-to-day life, such as double vision. It also increases the risk of stroke and transient ischemic attack (TIA). Vertebrobasilar disease is also called vertebrobasilar insufficiency.  What are the causes?  This condition may be caused by:  · A blockage in the blood vessels that carry blood to the back of the brain (vertebrobasilar arteries).  · A tear in a vertebrobasilar artery.  · A condition that causes less blood to flow to the base of the brain, such as high blood pressure or diabetes.    The condition can be triggered by:  · Dehydration.  · Sudden changes in blood pressure.  · Sudden changes in position.  · An injury.  · Turning your head to an extreme position.  · Overextending your neck.    What increases the risk?  This condition is more  likely to develop in people who:  · Are elderly.  · Are male.  · Are smokers.  · Have high blood pressure, high cholesterol, obesity, or diabetes.  · Have a family history of this condition.    What are the signs or symptoms?  Symptoms of this condition include:  · Numbness or weakness in the arms or legs.  · Sudden, severe dizziness (vertigo).  · Suddenly falling down (drop attack).  · Loss of coordination.  · Vision problems, such as double vision or vision loss.  · Nausea and vomiting.  · Slurred speech.  · Trouble swallowing.  · Headaches that affect the back of the head.    How is this diagnosed?  This condition is diagnosed with a physical exam and tests. Tests may include:  · An ultrasound.  · A CT scan.  · MRI.    How is this treated?  Treatment for this condition depends on the cause. It may involve taking blood-thinning medicine, such as aspirin, to reduce the risk of stroke. It may also involve lifestyle changes, such as:  · Exercising.  · Controlling your blood pressure, cholesterol, or diabetes.  · Quitting smoking.    Treatment is usually started at a hospital.  Follow these instructions at home:  · Take over-the-counter and prescription medicines only as told by your health care provider.  · Do not use any tobacco products, such as cigarettes, chewing tobacco, and e-cigarettes. If you need help quitting, ask your health care provider.  · Keep all follow-up visits as told by your health care provider. This is important.  Get help right away if:  · Your symptoms return.  · You have new symptoms.  Symptoms of this condition may represent a serious problem that is an emergency. Do not wait to see if the symptoms will go away. Get medical help right away. Call your local emergency services (911 in the [place] Do not drive yourself to the hospital.  This information is not intended to replace advice given to you by your health care provider. Make sure you discuss any questions you have with your health care  provider.  Document Released: 01/25/2006 Document Revised: 05/25/2017 Document Reviewed: 02/11/2016  [employer] Interactive Patient Education © 2018 [employer]

## 2019-03-20 ENCOUNTER — LAB (OUTPATIENT)
Dept: LAB | Facility: HOSPITAL | Age: 64
End: 2019-03-20

## 2019-03-20 ENCOUNTER — HOSPITAL ENCOUNTER (OUTPATIENT)
Dept: CARDIOLOGY | Facility: HOSPITAL | Age: 64
Discharge: HOME | End: 2019-03-20

## 2019-03-20 ENCOUNTER — TELEPHONE (OUTPATIENT)
Dept: CARDIOLOGY | Facility: CLINIC | Age: 64
End: 2019-03-20

## 2019-03-20 DIAGNOSIS — R06.02 SHORTNESS OF BREATH: ICD-10-CM

## 2019-03-20 DIAGNOSIS — E78.00 HYPERCHOLESTEROLEMIA: ICD-10-CM

## 2019-03-20 DIAGNOSIS — R07.2 PRECORDIAL PAIN: ICD-10-CM

## 2019-03-20 DIAGNOSIS — I65.23 BILATERAL CAROTID ARTERY STENOSIS: ICD-10-CM

## 2019-03-20 DIAGNOSIS — Z00.00 HEALTHCARE MAINTENANCE: ICD-10-CM

## 2019-03-20 DIAGNOSIS — E78.5 DYSLIPIDEMIA: ICD-10-CM

## 2019-03-20 DIAGNOSIS — I73.9 SUBCLAVIAN ARTERY DISEASE (HCC): ICD-10-CM

## 2019-03-20 DIAGNOSIS — I95.1 ORTHOSTATIC HYPOTENSION: ICD-10-CM

## 2019-03-20 DIAGNOSIS — I10 ESSENTIAL HYPERTENSION: ICD-10-CM

## 2019-03-20 DIAGNOSIS — I49.3 PVC (PREMATURE VENTRICULAR CONTRACTION): ICD-10-CM

## 2019-03-20 DIAGNOSIS — R09.89 BILATERAL CAROTID BRUITS: ICD-10-CM

## 2019-03-20 DIAGNOSIS — I25.10 CAD IN NATIVE ARTERY: ICD-10-CM

## 2019-03-20 DIAGNOSIS — J44.9 CHRONIC OBSTRUCTIVE PULMONARY DISEASE, UNSPECIFIED COPD TYPE (HCC): ICD-10-CM

## 2019-03-20 DIAGNOSIS — I73.9 PERIPHERAL VASCULAR DISEASE (HCC): ICD-10-CM

## 2019-03-20 DIAGNOSIS — F17.200 SMOKING: ICD-10-CM

## 2019-03-20 DIAGNOSIS — R00.2 PALPITATIONS: ICD-10-CM

## 2019-03-20 LAB
ALBUMIN SERPL-MCNC: 4.43 G/DL (ref 3.5–5.2)
ALBUMIN/GLOB SERPL: 1.4 G/DL
ALP SERPL-CCNC: 142 U/L (ref 39–117)
ALT SERPL W P-5'-P-CCNC: 26 U/L (ref 1–33)
ANION GAP SERPL CALCULATED.3IONS-SCNC: 12.4 MMOL/L
AST SERPL-CCNC: 23 U/L (ref 1–32)
BILIRUB SERPL-MCNC: 0.2 MG/DL (ref 0.2–1.2)
BUN BLD-MCNC: 20 MG/DL (ref 8–23)
BUN/CREAT SERPL: 23 (ref 7–25)
CALCIUM SPEC-SCNC: 9.8 MG/DL (ref 8.6–10.5)
CHLORIDE SERPL-SCNC: 101 MMOL/L (ref 98–107)
CHOLEST SERPL-MCNC: 165 MG/DL (ref 0–200)
CO2 SERPL-SCNC: 24.6 MMOL/L (ref 22–29)
CREAT BLD-MCNC: 0.87 MG/DL (ref 0.57–1)
GFR SERPL CREATININE-BSD FRML MDRD: 66 ML/MIN/1.73
GLOBULIN UR ELPH-MCNC: 3.3 GM/DL
GLUCOSE BLD-MCNC: 102 MG/DL (ref 65–99)
HDLC SERPL-MCNC: 35 MG/DL (ref 40–60)
LDLC SERPL CALC-MCNC: 96 MG/DL (ref 0–100)
LDLC/HDLC SERPL: 2.75 {RATIO}
MAGNESIUM SERPL-MCNC: 2.2 MG/DL (ref 1.6–2.4)
POTASSIUM BLD-SCNC: 3.9 MMOL/L (ref 3.5–5.2)
PROT SERPL-MCNC: 7.7 G/DL (ref 6–8.5)
SODIUM BLD-SCNC: 138 MMOL/L (ref 136–145)
TRIGL SERPL-MCNC: 168 MG/DL (ref 0–150)
TSH SERPL DL<=0.05 MIU/L-ACNC: 2.33 MIU/ML (ref 0.27–4.2)
VLDLC SERPL-MCNC: 33.6 MG/DL

## 2019-03-20 PROCEDURE — 93018 CV STRESS TEST I&R ONLY: CPT | Performed by: INTERNAL MEDICINE

## 2019-03-20 PROCEDURE — 84443 ASSAY THYROID STIM HORMONE: CPT | Performed by: NURSE PRACTITIONER

## 2019-03-20 PROCEDURE — 93306 TTE W/DOPPLER COMPLETE: CPT | Performed by: INTERNAL MEDICINE

## 2019-03-20 PROCEDURE — 80061 LIPID PANEL: CPT | Performed by: NURSE PRACTITIONER

## 2019-03-20 PROCEDURE — 93306 TTE W/DOPPLER COMPLETE: CPT

## 2019-03-20 PROCEDURE — 25010000002 REGADENOSON 0.4 MG/5ML SOLUTION: Performed by: INTERNAL MEDICINE

## 2019-03-20 PROCEDURE — A9500 TC99M SESTAMIBI: HCPCS | Performed by: INTERNAL MEDICINE

## 2019-03-20 PROCEDURE — 78452 HT MUSCLE IMAGE SPECT MULT: CPT | Performed by: INTERNAL MEDICINE

## 2019-03-20 PROCEDURE — 36415 COLL VENOUS BLD VENIPUNCTURE: CPT

## 2019-03-20 PROCEDURE — 83735 ASSAY OF MAGNESIUM: CPT | Performed by: NURSE PRACTITIONER

## 2019-03-20 PROCEDURE — 78452 HT MUSCLE IMAGE SPECT MULT: CPT

## 2019-03-20 PROCEDURE — 0 TECHNETIUM SESTAMIBI: Performed by: INTERNAL MEDICINE

## 2019-03-20 PROCEDURE — 80053 COMPREHEN METABOLIC PANEL: CPT | Performed by: NURSE PRACTITIONER

## 2019-03-20 PROCEDURE — 93017 CV STRESS TEST TRACING ONLY: CPT

## 2019-03-20 RX ADMIN — REGADENOSON 0.4 MG: 0.08 INJECTION, SOLUTION INTRAVENOUS at 11:00

## 2019-03-20 RX ADMIN — TECHNETIUM TC 99M SESTAMIBI 1 DOSE: 1 INJECTION INTRAVENOUS at 08:56

## 2019-03-20 RX ADMIN — TECHNETIUM TC 99M SESTAMIBI 1 DOSE: 1 INJECTION INTRAVENOUS at 11:00

## 2019-03-20 NOTE — TELEPHONE ENCOUNTER
Patient notified of results. She was fasting when labs were drawn. Will watch diet.     ----- Message from OSCAR Hutson sent at 3/20/2019  5:03 PM EDT -----  Slightly elevated fasting sugar.  Monitor sweets/carbs, which will also help with high trig.

## 2019-03-25 ENCOUNTER — TELEPHONE (OUTPATIENT)
Dept: CARDIOLOGY | Facility: CLINIC | Age: 64
End: 2019-03-25

## 2019-03-25 LAB
BH CV ECHO MEAS - ACS: 2 CM
BH CV ECHO MEAS - AO MEAN PG: 2.2 MMHG
BH CV ECHO MEAS - AO ROOT AREA (BSA CORRECTED): 1.7
BH CV ECHO MEAS - AO ROOT AREA: 8.1 CM^2
BH CV ECHO MEAS - AO ROOT DIAM: 3.2 CM
BH CV ECHO MEAS - AO V2 MEAN: 69.3 CM/SEC
BH CV ECHO MEAS - AO V2 VTI: 20.5 CM
BH CV ECHO MEAS - BSA(HAYCOCK): 2 M^2
BH CV ECHO MEAS - BSA: 1.9 M^2
BH CV ECHO MEAS - BZI_BMI: 36.7 KILOGRAMS/M^2
BH CV ECHO MEAS - BZI_METRIC_HEIGHT: 154.9 CM
BH CV ECHO MEAS - BZI_METRIC_WEIGHT: 88 KG
BH CV ECHO MEAS - EDV(CUBED): 29.2 ML
BH CV ECHO MEAS - EDV(MOD-SP4): 33 ML
BH CV ECHO MEAS - EDV(TEICH): 37.3 ML
BH CV ECHO MEAS - EF(CUBED): 81.9 %
BH CV ECHO MEAS - EF(MOD-SP4): 60.6 %
BH CV ECHO MEAS - EF(TEICH): 76.1 %
BH CV ECHO MEAS - ESV(CUBED): 5.3 ML
BH CV ECHO MEAS - ESV(MOD-SP4): 13 ML
BH CV ECHO MEAS - ESV(TEICH): 8.9 ML
BH CV ECHO MEAS - FS: 43.4 %
BH CV ECHO MEAS - IVS/LVPW: 0.75
BH CV ECHO MEAS - IVSD: 1.2 CM
BH CV ECHO MEAS - LA DIMENSION: 3.7 CM
BH CV ECHO MEAS - LA/AO: 1.2
BH CV ECHO MEAS - LV DIASTOLIC VOL/BSA (35-75): 17.7 ML/M^2
BH CV ECHO MEAS - LV IVRT: 0.09 SEC
BH CV ECHO MEAS - LV MASS(C)D: 136.8 GRAMS
BH CV ECHO MEAS - LV MASS(C)DI: 73.4 GRAMS/M^2
BH CV ECHO MEAS - LV SYSTOLIC VOL/BSA (12-30): 7 ML/M^2
BH CV ECHO MEAS - LVIDD: 3.1 CM
BH CV ECHO MEAS - LVIDS: 1.7 CM
BH CV ECHO MEAS - LVLD AP4: 5.3 CM
BH CV ECHO MEAS - LVLS AP4: 4.8 CM
BH CV ECHO MEAS - LVOT AREA (M): 3.1 CM^2
BH CV ECHO MEAS - LVOT AREA: 3.2 CM^2
BH CV ECHO MEAS - LVOT DIAM: 2 CM
BH CV ECHO MEAS - LVPWD: 1.5 CM
BH CV ECHO MEAS - MV A MAX VEL: 81.4 CM/SEC
BH CV ECHO MEAS - MV DEC SLOPE: 249.2 CM/SEC^2
BH CV ECHO MEAS - MV E MAX VEL: 59.2 CM/SEC
BH CV ECHO MEAS - MV E/A: 0.73
BH CV ECHO MEAS - RVDD: 2.3 CM
BH CV ECHO MEAS - SI(AO): 89 ML/M^2
BH CV ECHO MEAS - SI(CUBED): 12.8 ML/M^2
BH CV ECHO MEAS - SI(MOD-SP4): 10.7 ML/M^2
BH CV ECHO MEAS - SI(TEICH): 15.2 ML/M^2
BH CV ECHO MEAS - SV(AO): 166.1 ML
BH CV ECHO MEAS - SV(CUBED): 23.9 ML
BH CV ECHO MEAS - SV(MOD-SP4): 20 ML
BH CV ECHO MEAS - SV(TEICH): 28.4 ML
BH CV NUCLEAR PRIOR STUDY: 3
BH CV STRESS COMMENTS STAGE 1: NORMAL
BH CV STRESS DOSE REGADENOSON STAGE 1: 0.4
BH CV STRESS DURATION MIN STAGE 1: 0
BH CV STRESS DURATION SEC STAGE 1: 10
BH CV STRESS PROTOCOL 1: NORMAL
BH CV STRESS RECOVERY BP: NORMAL MMHG
BH CV STRESS RECOVERY HR: 104 BPM
BH CV STRESS STAGE 1: 1
MAXIMAL PREDICTED HEART RATE: 159 BPM
MAXIMAL PREDICTED HEART RATE: 159 BPM
PERCENT MAX PREDICTED HR: 68.55 %
STRESS BASELINE BP: NORMAL MMHG
STRESS BASELINE HR: 82 BPM
STRESS PERCENT HR: 81 %
STRESS POST PEAK BP: NORMAL MMHG
STRESS POST PEAK HR: 109 BPM
STRESS TARGET HR: 135 BPM
STRESS TARGET HR: 135 BPM

## 2019-03-25 NOTE — TELEPHONE ENCOUNTER
Pt returned my call:  I informed her of results and the appt tomorrow, she stated that she would be here.       Pt stated that she has a blockage in her neck, I will send a message to Felicita to make sure she's aware.

## 2019-03-25 NOTE — TELEPHONE ENCOUNTER
Stress:  1.  Possible inferobasilar ischemia.     2.  Preserved post stress ejection fraction of 83% with no focal wall motion abnormalities.     3.  Normal lung heart radiopharmaceutical ratios.  Increased transient ischemic dilation ratio of 0.43        Felicita Robledo APRN [place], Emily             Have patient come in tomorrow at 2PM

## 2019-03-26 ENCOUNTER — OFFICE VISIT (OUTPATIENT)
Dept: CARDIOLOGY | Facility: CLINIC | Age: 64
End: 2019-03-26

## 2019-03-26 ENCOUNTER — LAB (OUTPATIENT)
Dept: LAB | Facility: HOSPITAL | Age: 64
End: 2019-03-26

## 2019-03-26 VITALS
OXYGEN SATURATION: 95 % | HEIGHT: 61 IN | WEIGHT: 193 LBS | SYSTOLIC BLOOD PRESSURE: 123 MMHG | HEART RATE: 100 BPM | DIASTOLIC BLOOD PRESSURE: 72 MMHG | BODY MASS INDEX: 36.44 KG/M2

## 2019-03-26 DIAGNOSIS — I10 ESSENTIAL HYPERTENSION: ICD-10-CM

## 2019-03-26 DIAGNOSIS — R94.39 ABNORMAL STRESS TEST: ICD-10-CM

## 2019-03-26 DIAGNOSIS — E78.5 DYSLIPIDEMIA: ICD-10-CM

## 2019-03-26 DIAGNOSIS — R09.89 BILATERAL CAROTID BRUITS: ICD-10-CM

## 2019-03-26 DIAGNOSIS — I25.10 CAD IN NATIVE ARTERY: Primary | ICD-10-CM

## 2019-03-26 DIAGNOSIS — R06.02 BREATH SHORTNESS: ICD-10-CM

## 2019-03-26 DIAGNOSIS — I25.10 CAD IN NATIVE ARTERY: ICD-10-CM

## 2019-03-26 DIAGNOSIS — Z00.00 HEALTHCARE MAINTENANCE: ICD-10-CM

## 2019-03-26 DIAGNOSIS — E78.00 ELEVATED CHOLESTEROL: ICD-10-CM

## 2019-03-26 DIAGNOSIS — I95.1 ORTHOSTATIC HYPOTENSION: ICD-10-CM

## 2019-03-26 DIAGNOSIS — R07.2 PRECORDIAL PAIN: ICD-10-CM

## 2019-03-26 DIAGNOSIS — I73.9 PERIPHERAL VASCULAR DISEASE (HCC): ICD-10-CM

## 2019-03-26 PROCEDURE — 99214 OFFICE O/P EST MOD 30 MIN: CPT | Performed by: NURSE PRACTITIONER

## 2019-03-26 PROCEDURE — 85027 COMPLETE CBC AUTOMATED: CPT | Performed by: NURSE PRACTITIONER

## 2019-03-26 PROCEDURE — 36415 COLL VENOUS BLD VENIPUNCTURE: CPT

## 2019-03-26 PROCEDURE — 80048 BASIC METABOLIC PNL TOTAL CA: CPT | Performed by: NURSE PRACTITIONER

## 2019-03-26 RX ORDER — HYDROCHLOROTHIAZIDE 12.5 MG/1
12.5 TABLET ORAL DAILY
Refills: 6 | COMMUNITY
Start: 2019-03-01 | End: 2019-04-29 | Stop reason: SDUPTHER

## 2019-03-26 RX ORDER — CLOPIDOGREL BISULFATE 75 MG/1
75 TABLET ORAL DAILY
Qty: 30 TABLET | Refills: 11 | Status: SHIPPED | OUTPATIENT
Start: 2019-03-26 | End: 2019-04-29 | Stop reason: SDUPTHER

## 2019-03-26 RX ORDER — FLUTICASONE PROPIONATE 50 MCG
SPRAY, SUSPENSION (ML) NASAL
Refills: 3 | COMMUNITY
Start: 2019-03-14

## 2019-03-26 RX ORDER — ROSUVASTATIN CALCIUM 40 MG/1
40 TABLET, COATED ORAL DAILY
Qty: 90 TABLET | Refills: 3 | Status: SHIPPED | OUTPATIENT
Start: 2019-03-26 | End: 2020-02-04

## 2019-03-26 NOTE — PATIENT INSTRUCTIONS
"Fat and Cholesterol Restricted Eating Plan  Getting too much fat and cholesterol in your diet may cause health problems. Choosing the right foods helps keep your fat and cholesterol at normal levels. This can keep you from getting certain diseases.  Your doctor may recommend an eating plan that includes:  · Total fat: ______% or less of total calories a day.  · Saturated fat: ______% or less of total calories a day.  · Cholesterol: less than _________mg a day.  · Fiber: ______g a day.    What are tips for following this plan?  General tips  · Work with your doctor to lose weight if you need to.  · Avoid:  ? Foods with added sugar.  ? Fried foods.  ? Foods with partially hydrogenated oils.  · Limit alcohol intake to no more than 1 drink a day for nonpregnant women and 2 drinks a day for men. One drink equals 12 oz of beer, 5 oz of wine, or 1½ oz of hard liquor.  Reading food labels  · Check food labels for:  ? Trans fats.  ? Partially hydrogenated oils.  ? Saturated fat (g) in each serving.  ? Cholesterol (mg) in each serving.  ? Fiber (g) in each serving.  · Choose foods with healthy fats, such as:  ? Monounsaturated fats.  ? Polyunsaturated fats.  ? Omega-3 fats.  · Choose grain products that have whole grains. Look for the word \"whole\" as the first word in the ingredient list.  Cooking  · Cook foods using low-fat methods. These include baking, boiling, grilling, and broiling.  · Eat more home-cooked foods. Eat at restaurants and buffets less often.  · Avoid cooking using saturated fats, such as butter, cream, palm oil, palm kernel oil, and coconut oil.  Meal planning  · At meals, divide your plate into four equal parts:  ? Fill one-half of your plate with vegetables and green salads.  ? Fill one-fourth of your plate with whole grains.  ? Fill one-fourth of your plate with low-fat (lean) protein foods.  · Eat fish that is high in omega-3 fats at least two times a week. This includes mackerel, tuna, sardines, and " salmon.  · Eat foods that are high in fiber, such as whole grains, beans, apples, broccoli, carrots, peas, and barley.  Recommended foods  Grains  · Whole grains, such as whole wheat or whole grain breads, crackers, cereals, and pasta. Unsweetened oatmeal, bulgur, barley, quinoa, or brown rice. Corn or whole wheat flour tortillas.  Vegetables  · Fresh or frozen vegetables (raw, steamed, roasted, or grilled). Green salads.  Fruits  · All fresh, canned (in natural juice), or frozen fruits.  Meats and other protein foods  · Ground beef (85% or leaner), grass-fed beef, or beef trimmed of fat. Skinless chicken or turkey. Ground chicken or turkey. Pork trimmed of fat. All fish and seafood. Egg whites. Dried beans, peas, or lentils. Unsalted nuts or seeds. Unsalted canned beans. Nut butters without added sugar or oil.  Dairy  · Low-fat or nonfat dairy products, such as skim or 1% milk, 2% or reduced-fat cheeses, low-fat and fat-free ricotta or cottage cheese, or plain low-fat and nonfat yogurt.  Fats and oils  · Tub margarine without trans fats. Light or reduced-fat mayonnaise and salad dressings. Avocado. Olive, canola, sesame, or safflower oils.  The items listed above may not be a complete list of recommended foods or beverages. Contact your dietitian for more options.  Foods to avoid  Grains  · White bread. White pasta. White rice. Cornbread. Bagels, pastries, and croissants. Crackers and snack foods that contain trans fat and hydrogenated oils.  Vegetables  · Vegetables cooked in cheese, cream, or butter sauce. Fried vegetables.  Fruits  · Canned fruit in heavy syrup. Fruit in cream or butter sauce. Fried fruit.  Meats and other protein foods  · Fatty cuts of meat. Ribs, chicken wings, saenz, sausage, bologna, salami, chitterlings, fatback, hot dogs, bratwurst, and packaged lunch meats. Liver and organ meats. Whole eggs and egg yolks. Chicken and turkey with skin. Fried meat.  Dairy  · Whole or 2% milk, cream,  half-and-half, and cream cheese. Whole milk cheeses. Whole-fat or sweetened yogurt. Full-fat cheeses. Nondairy creamers and whipped toppings. Processed cheese, cheese spreads, and cheese curds.  Beverages  · Alcohol. Sugar-sweetened drinks such as sodas, lemonade, and fruit drinks.  Fats and oils  · Butter, stick margarine, lard, shortening, ghee, or saenz fat. Coconut, palm kernel, and palm oils.  Sweets and desserts  · Corn syrup, sugars, honey, and molasses. Candy. Jam and jelly. Syrup. Sweetened cereals. Cookies, pies, cakes, donuts, muffins, and ice cream.  The items listed above may not be a complete list of foods and beverages to avoid. Contact your dietitian for more information.  Summary  · Choosing the right foods helps keep your fat and cholesterol at normal levels. This can keep you from getting certain diseases.  · At meals, fill one-half of your plate with vegetables and green salads.  · Eat high-fiber foods, like whole grains, beans, apples, carrots, peas, and barley.  · Limit added sugar, saturated fats, alcohol, and fried foods.  This information is not intended to replace advice given to you by your health care provider. Make sure you discuss any questions you have with your health care provider.  Document Released: 06/18/2013 Document Revised: 09/04/2018 Document Reviewed: 09/04/2018  [employer] Interactive Patient Education © 2019 [employer]  BMI for Adults  Body mass index (BMI) is a number that is calculated from a person's weight and height. In most adults, the number is used to find how much of an adult's weight is made up of fat. BMI is not as accurate as a direct measure of body fat.  How is BMI calculated?  BMI is calculated by dividing weight in kilograms by height in meters squared. It can also be calculated by dividing weight in pounds by height in inches squared, then multiplying the resulting number by 703. Charts are available to help you find your BMI quickly and easily without  doing this calculation.  How is BMI interpreted?  Health care professionals use BMI charts to identify whether an adult is underweight, at a normal weight, or overweight based on the following guidelines:  · Underweight: BMI less than 18.5.  · Normal weight: BMI between 18.5 and 24.9.  · Overweight: BMI between 25 and 29.9.  · Obese: BMI of 30 and above.    BMI is usually interpreted the same for males and females.  Weight includes both fat and muscle, so someone with a muscular build, such as an athlete, may have a BMI that is higher than 24.9. In cases like these, BMI may not accurately depict body fat. To determine if excess body fat is the cause of a BMI of 25 or higher, further assessments may need to be done by a health care provider.  Why is BMI a useful tool?  BMI is used to identify a possible weight problem that may be related to a medical problem or may increase the risk for medical problems. BMI can also be used to promote changes to reach a healthy weight.  This information is not intended to replace advice given to you by your health care provider. Make sure you discuss any questions you have with your health care provider.  Document Released: 08/29/2005 Document Revised: 04/27/2017 Document Reviewed: 05/15/2015  [employer] Interactive Patient Education © 2018 [employer]    Coronary Angiogram With Stent  Coronary angiogram with stent placement is a procedure to widen or open a narrow blood vessel of the heart (coronary artery). Arteries may become blocked by cholesterol buildup (plaques) in the lining of the wall. When a coronary artery becomes partially blocked, blood flow to that area decreases. This may lead to chest pain or a heart attack (myocardial infarction).  A stent is a small piece of metal that looks like mesh or a spring. Stent placement may be done as treatment for a heart attack or right after a coronary angiogram in which a blocked artery is found.  Let your health care provider know  about:  · Any allergies you have.  · All medicines you are taking, including vitamins, herbs, eye drops, creams, and over-the-counter medicines.  · Any problems you or family members have had with anesthetic medicines.  · Any blood disorders you have.  · Any surgeries you have had.  · Any medical conditions you have.  · Whether you are pregnant or may be pregnant.  What are the risks?  Generally, this is a safe procedure. However, problems may occur, including:  · Damage to the heart or its blood vessels.  · A return of blockage.  · Bleeding, infection, or bruising at the insertion site.  · A collection of blood under the skin (hematoma) at the insertion site.  · A blood clot in another part of the body.  · Kidney injury.  · Allergic reaction to the dye or contrast that is used.  · Bleeding into the abdomen (retroperitoneal bleeding).    What happens before the procedure?  Staying hydrated  Follow instructions from your health care provider about hydration, which may include:  · Up to 2 hours before the procedure - you may continue to drink clear liquids, such as water, clear fruit juice, black coffee, and plain tea.    Eating and drinking restrictions  Follow instructions from your health care provider about eating and drinking, which may include:  · 8 hours before the procedure - stop eating heavy meals or foods such as meat, fried foods, or fatty foods.  · 6 hours before the procedure - stop eating light meals or foods, such as toast or cereal.  · 2 hours before the procedure - stop drinking clear liquids.    Ask your health care provider about:  · Changing or stopping your regular medicines. This is especially important if you are taking diabetes medicines or blood thinners.  · Taking medicines such as ibuprofen. These medicines can thin your blood. Do not take these medicines before your procedure if your health care provider instructs you not to. Generally, aspirin is recommended before a procedure of passing  a small, thin tube (catheter) through a blood vessel and into the heart (cardiac catheterization).    What happens during the procedure?  · An IV tube will be inserted into one of your veins.  · You will be given one or more of the following:  ? A medicine to help you relax (sedative).  ? A medicine to numb the area where the catheter will be inserted into an artery (local anesthetic).  · To reduce your risk of infection:  ? Your health care team will wash or sanitize their hands.  ? Your skin will be washed with soap.  ? Hair may be removed from the area where the catheter will be inserted.  · Using a guide wire, the catheter will be inserted into an artery. The location may be in your groin, in your wrist, or in the fold of your arm (near your elbow).  · A type of X-ray (fluoroscopy) will be used to help guide the catheter to the opening of the arteries in the heart.  · A dye will be injected into the catheter, and X-rays will be taken. The dye will help to show where any narrowing or blockages are located in the arteries.  · A tiny wire will be guided to the blocked spot, and a balloon will be inflated to make the artery wider.  · The stent will be expanded and will crush the plaques into the wall of the vessel. The stent will hold the area open and improve the blood flow. Most stents have a drug coating to reduce the risk of the stent narrowing over time.  · The artery may be made wider using a drill, laser, or other tools to remove plaques.  · When the blood flow is better, the catheter will be removed. The lining of the artery will grow over the stent, which stays where it was placed.  This procedure may vary among health care providers and hospitals.  What happens after the procedure?  · If the procedure is done through the leg, you will be kept in bed lying flat for about 6 hours. You will be instructed to not bend and not cross your legs.  · The insertion site will be checked frequently.  · The pulse in  your foot or wrist will be checked frequently.  · You may have additional blood tests, X-rays, and a test that records the electrical activity of your heart (electrocardiogram, or ECG).  This information is not intended to replace advice given to you by your health care provider. Make sure you discuss any questions you have with your health care provider.  Document Released: 06/23/2004 Document Revised: 08/17/2017 Document Reviewed: 07/23/2017  [email or web address] Interactive Patient Education © 2019 [employer]

## 2019-03-26 NOTE — PROGRESS NOTES
Subjective   Barby Sue is a 63 y.o. female     Chief Complaint   Patient presents with   • Follow-up     Pt in office to follow up on abn stress. States her daughters want to discuss getting FMLA.    • Coronary Artery Disease       HPI    Problem List:    1. Coronary artery disease  1.1 left heart cath 3/20/1 12-20% right coronary artery, circumflex 50-70% proximal, 20-30% first diagonal, EF 60%  1.2 Follow up stress August 2015 demonstrated chest wall attenuation but no sign of ischemia, preserved ER  1.3 Stress Test 12/14/16 - no ischemia; low risk  1.4 stress test 3/20/19-possible inferior basilar ischemia, preserved post stress EF of 83%, increased transient ischemic dilation ratio of 0.43  2. PVD with history of aortobifemoral bypass 2014  2.1 BLE Arterial U/S 12/14/16 - < or equal to 50% right common femoral artery; high-grade stenosis left posterior tibial artery 50-75% stenosis  2.2 CTA Abd with runoff 5/16/17 - patent aorta bi-iliac graft, mild narrowing seen at prox aspect of the celiac trunk.  No high grade stenosis. Fatty infiltration of the liver.   3. Chronic hypotension   4. Hyperlipidemia   5. Carotid Artery Disease   5.1 Carotid Artery U/S 12/14/16 - 50-75% right external carotid; 16-49% KENNEDY and LICA; antegrade flow both vertebral arteries   5.2 Carotid Artery US 2/13/19 - LICA 50-69%; less than 50% on the right   5.3 CTA head 2/15/19 - left external carotid artery is occluded, no sig stenosis KENNEDY; left vertebral artery at arch is near occluded; enlarged precarinal node 1.4 x 1.5 and right of the midline measuring 1.1 x 1.6, emphysema   6. Echo 12/14/16 - mild LVH; EF > 65%; DD I; trace MR; physiological TR; mild AL  6.1 echo 3/20/19-mild LVH, EF 61-65%, diastolic dysfunction 1  7. COPD/Emphysema   8. Palpitations  8.1 Event Monitor 3/31-4/11/17 - NSR - ST    Patient is a 63-year-old female who presents today for a follow-up on testing with her daughter at her side.  She continues to have left  anterior chest pressure that will go in between her shoulder blades.  However last night she stated it was midsternal pressure.  She says it can occur anytime and she has taken up to 3 nitroglycerin during an episode.  She says that she will get diaphoretic and short of breath when this occurs.  She says her heart will race at times and this is separate from her chest pain.  She denies any dizziness, presyncope, syncope or PND.  She does get swelling in her legs and takes a water pill every day.  She says that she does get short of breath is worse when she lays down.  She says she did go see the neuroradiologist and is supposed to be going back for a repeat CT scan.  She said he wanted to see her back in 2 weeks and it has been 2 weeks since she has not heard from their office.  I will have Uma follow-up with your office to find out the status and inform the patient.  She has quit smoking since then and quit [employer] as well.    We went over stress and echo.    Current Outpatient Medications   Medication Sig Dispense Refill   • aspirin 81 MG EC tablet Take 1 tablet by mouth daily.     • Biotin 1000 MCG tablet Take 1 tablet by mouth daily.     • BLACK COHOSH PO Take 1,000 mg by mouth daily.     • buPROPion XL (WELLBUTRIN XL) 150 MG 24 hr tablet Take 1 tablet by mouth Daily. 90 tablet 3   • CloNIDine (CATAPRES) 0.1 MG tablet Take 1 tablet by mouth Every 12 (Twelve) Hours. 180 tablet 3   • cyclobenzaprine (FLEXERIL) 10 MG tablet Take 1 tablet by mouth 3 (three) times a day.     • fludrocortisone 0.1 MG tablet Take 1 tablet by mouth 3 (Three) Times a Week. Take on Monday, Wednesday, and Friday. 90 tablet 3   • fluticasone (FLONASE) 50 MCG/ACT nasal spray   3   • furosemide (LASIX) 20 MG tablet Take 1 tablet by mouth Daily. 90 tablet 3   • gabapentin (NEURONTIN) 400 MG capsule Take 400 mg by mouth 3 (Three) Times a Day.     • isosorbide mononitrate (IMDUR) 30 MG 24 hr tablet Take 1 tablet by mouth Daily. 90 tablet 3    • losartan (COZAAR) 100 MG tablet Take 1 tablet by mouth Daily. 90 tablet 3   • metoprolol tartrate (LOPRESSOR) 25 MG tablet Take 1 tablet by mouth Every 12 (Twelve) Hours. 180 tablet 3   • midodrine (PROAMATINE) 2.5 MG tablet Take 1 tablet by mouth 3 (Three) Times a Day. 270 tablet 3   • nitroglycerin (NITROSTAT) 0.4 MG SL tablet Place 1 tablet under the tongue Every 5 (Five) Minutes As Needed for Chest Pain. Only take up to 3 tablets. Call 911 if pain persists. 25 tablet 5   • oxyCODONE-acetaminophen (PERCOCET)  MG per tablet Take 1 tablet by mouth every 8 (eight) hours as needed for moderate pain (4-6).     • pantoprazole (PROTONIX) 40 MG EC tablet Take 1 tablet by mouth Daily. 90 tablet 3   • potassium chloride (MICRO-K) 10 MEQ CR capsule Take 1 capsule by mouth Daily. 90 capsule 3   • rosuvastatin (CRESTOR) 40 MG tablet Take 1 tablet by mouth Daily. 90 tablet 3   • Umeclidinium Bromide 62.5 MCG/INH aerosol powder  Inhale 1 puff Daily. 90 each 3   • vitamin D (ERGOCALCIFEROL) 51333 UNITS capsule capsule 1 (One) Time Per Week.     • clopidogrel (PLAVIX) 75 MG tablet Take 1 tablet by mouth Daily. 30 tablet 11   • hydrochlorothiazide (HYDRODIURIL) 12.5 MG tablet Take 12.5 mg by mouth Daily.  6     No current facility-administered medications for this visit.        ALLERGIES    Lisinopril; Adhesive tape; Ibuprofen; and Latex    Past Medical History:   Diagnosis Date   • Anxiety    • Back pain    • Carotid bruit    • Chest pain    • COPD (chronic obstructive pulmonary disease) (CMS/HCC)    • Dyslipidemia    • Fainting    • Functional murmur    • GERD (gastroesophageal reflux disease)    • Hypercholesterolemia    • Hyperlipidemia    • Hypertension    • Hypokalemia    • Orthostatic hypotension    • Orthostatic hypotension    • Peripheral vascular disease (CMS/HCC)    • Peripheral vascular disease (CMS/HCC)     PVD with history of aortobifemoral bypass   • Shortness of breath    • Sleep apnea        Social  History     Socioeconomic History   • Marital status:      Spouse name: Not on file   • Number of children: Not on file   • Years of education: Not on file   • Highest education level: Not on file   Tobacco Use   • Smoking status: Former Smoker     Packs/day: 0.50     Types: Cigarettes   • Smokeless tobacco: Never Used   • Tobacco comment: Pt states she stopped smoking 3/14/19   Substance and Sexual Activity   • Alcohol use: No   • Drug use: No   • Sexual activity: Defer       Family History   Problem Relation Age of Onset   • Heart failure Mother         Congestive   • Hypertension Mother    • Heart failure Sister         Congestive   • Coronary artery disease Sister    • Heart attack Sister         Acute   • Hypertension Sister    • Sudden death Sister    • Coronary artery disease Brother    • Seizures Brother    • Hypertension Brother    • Heart failure Maternal Grandmother         Congestive       Review of Systems   Constitutional: Positive for chills, diaphoresis (with CP ) and fatigue.   HENT: Positive for congestion. Negative for rhinorrhea and sore throat.    Eyes: Positive for visual disturbance (glasses daily ).   Respiratory: Positive for chest tightness (sometimes ) and shortness of breath (Pt states that her SoA gets worse when she lays down; with CP).    Cardiovascular: Positive for chest pain (Pt c/o chest pressure on left side of chest, states she took one nitro and it stopped; yestserday between shoulder blades hurt and then last night midsternum pressure; up to 3 nitro; can occur anytime), palpitations (races, separate from CP; when she lays down at night) and leg swelling (BLE edema; takes water pill everyday).   Gastrointestinal: Negative for abdominal pain, blood in stool, constipation, diarrhea, nausea and vomiting.   Endocrine: Positive for cold intolerance (back and forth ) and heat intolerance (back and forth ).   Genitourinary: Negative for difficulty urinating, dysuria, frequency,  "hematuria and urgency.   Musculoskeletal: Positive for arthralgias, back pain and neck pain.   Skin: Negative for rash and wound.   Allergic/Immunologic: Positive for environmental allergies. Negative for food allergies.        Chronic allergic rhinitis    Neurological: Negative for dizziness, syncope, weakness, light-headedness, numbness and headaches.   Hematological: Bruises/bleeds easily (both ).   Psychiatric/Behavioral: Positive for sleep disturbance (Pt states she has issues falling asleep and staying asleep. Denies SoA at HS ).       Objective   /72   Pulse 100   Ht 154.9 cm (61\")   Wt 87.5 kg (193 lb)   SpO2 95%   BMI 36.47 kg/m²   Vitals:    03/26/19 1407   BP: 123/72   Pulse: 100   SpO2: 95%   Weight: 87.5 kg (193 lb)   Height: 154.9 cm (61\")      Lab Results (most recent)     None        Physical Exam   Constitutional: She is oriented to person, place, and time. Vital signs are normal. She appears well-developed and well-nourished. She is active and cooperative.   HENT:   Head: Normocephalic.   Eyes: Lids are normal.   Wears glasses    Neck: Normal carotid pulses, no hepatojugular reflux and no JVD present. Carotid bruit is present (left ).   Cardiovascular: Normal rate, regular rhythm and normal heart sounds.   Pulses:       Radial pulses are 2+ on the right side, and 2+ on the left side.        Dorsalis pedis pulses are 2+ on the right side, and 2+ on the left side.        Posterior tibial pulses are 2+ on the right side, and 2+ on the left side.   No edema BLE.    Pulmonary/Chest: Effort normal and breath sounds normal.   Abdominal: Normal appearance and bowel sounds are normal.   Neurological: She is alert and oriented to person, place, and time.   Skin: Skin is warm, dry and intact.   Psychiatric: She has a normal mood and affect. Her speech is normal and behavior is normal. Judgment and thought content normal. Cognition and memory are normal.       Procedure   Procedures   "       Assessment/Plan      Diagnosis Plan   1. CAD in native artery  clopidogrel (PLAVIX) 75 MG tablet    [facility]    CBC (No Diff)   2. Essential hypertension  [facility]    Basic Metabolic Panel   3. Bilateral carotid bruits  [facility]   4. Elevated cholesterol  [facility]   5. Orthostatic hypotension  [facility]   6. Peripheral vascular disease (CMS/HCC)  [facility]   7. Breath shortness  [facility]   8. Precordial pain  [facility]   9. Abnormal stress test  [facility]   10. Dyslipidemia  rosuvastatin (CRESTOR) 40 MG tablet    [facility]   11. Healthcare maintenance  [facility]    CBC (No Diff)    Basic Metabolic Panel       Return 1 week [facility] .    CAD/hypertension/high cholesterol/shortness of breath/chest pain/abnormal stress test-patient will have left heart cath.  She will start Plavix.  She will get CBC and BMP.  She will continue her medication regimen otherwise.  She will follow-up 1 week after left heart cath or sooner if any changes.  Patient is already on antianginal.  She will continues nitro as needed for chest pain no resolution she will go to the ER.         Patient's Body mass index is 36.47 kg/m². BMI is above normal parameters. Recommendations include: educational material.      Electronically signed by:

## 2019-03-27 ENCOUNTER — TELEPHONE (OUTPATIENT)
Dept: CARDIOLOGY | Facility: CLINIC | Age: 64
End: 2019-03-27

## 2019-03-27 DIAGNOSIS — R79.89 ELEVATED SERUM CREATININE: Primary | ICD-10-CM

## 2019-03-27 LAB
ANION GAP SERPL CALCULATED.3IONS-SCNC: 12.4 MMOL/L
BUN BLD-MCNC: 28 MG/DL (ref 8–23)
BUN/CREAT SERPL: 26.2 (ref 7–25)
CALCIUM SPEC-SCNC: 9.2 MG/DL (ref 8.6–10.5)
CHLORIDE SERPL-SCNC: 98 MMOL/L (ref 98–107)
CO2 SERPL-SCNC: 23.6 MMOL/L (ref 22–29)
CREAT BLD-MCNC: 1.07 MG/DL (ref 0.57–1)
DEPRECATED RDW RBC AUTO: 45.4 FL (ref 37–54)
ERYTHROCYTE [DISTWIDTH] IN BLOOD BY AUTOMATED COUNT: 13.8 % (ref 12.3–15.4)
GFR SERPL CREATININE-BSD FRML MDRD: 52 ML/MIN/1.73
GLUCOSE BLD-MCNC: 94 MG/DL (ref 65–99)
HCT VFR BLD AUTO: 41.6 % (ref 34–46.6)
HGB BLD-MCNC: 13.8 G/DL (ref 12–15.9)
MCH RBC QN AUTO: 31.2 PG (ref 26.6–33)
MCHC RBC AUTO-ENTMCNC: 33.2 G/DL (ref 31.5–35.7)
MCV RBC AUTO: 94.1 FL (ref 79–97)
PLATELET # BLD AUTO: 271 10*3/MM3 (ref 140–450)
PMV BLD AUTO: 10.5 FL (ref 6–12)
POTASSIUM BLD-SCNC: 3.9 MMOL/L (ref 3.5–5.2)
RBC # BLD AUTO: 4.42 10*6/MM3 (ref 3.77–5.28)
SODIUM BLD-SCNC: 134 MMOL/L (ref 136–145)
WBC NRBC COR # BLD: 6.99 10*3/MM3 (ref 3.4–10.8)

## 2019-03-27 NOTE — TELEPHONE ENCOUNTER
----- Message from OSCAR Hutson sent at 3/27/2019 12:51 PM EDT -----  For [facility].  Have her use her Lasix only PRN.  Her kidney function and BUN are up so she is a little dehydrated.  Thanks!.  Repeat a BMP in 2 weeks.

## 2019-03-27 NOTE — TELEPHONE ENCOUNTER
Ref Range & Units 1d ago 7d ago   Glucose 65 - 99 mg/dL 94  102 Abnormally high     BUN 8 - 23 mg/dL 28 Abnormally high   20    Creatinine 0.57 - 1.00 mg/dL 1.07 Abnormally high   0.87    Sodium 136 - 145 mmol/L 134 Abnormally low   138    Potassium 3.5 - 5.2 mmol/L 3.9  3.9    Chloride 98 - 107 mmol/L 98  101    CO2 22.0 - 29.0 mmol/L 23.6  24.6    Calcium 8.6 - 10.5 mg/dL 9.2  9.8    eGFR Non African Amer >60 mL/min/1.73 52 Abnormally low   66    BUN/Creatinine Ratio 7.0 - 25.0 26.2 Abnormally high   23.0    Anion Gap mmol/L 12.4  12.4

## 2019-03-27 NOTE — TELEPHONE ENCOUNTER
Informed pt of her results and the message from Felicita, she verbalized understanding.     Also informed pt that we reiceved [place] paperwork for her daughter, but she didn't complete her portion. Stated that I need her to come and complete her portion, so that I can scan it into her chart. She verbalized understanding and stated she would have her come in to complete.

## 2019-03-28 ENCOUNTER — TELEPHONE (OUTPATIENT)
Dept: CARDIOLOGY | Facility: CLINIC | Age: 64
End: 2019-03-28

## 2019-03-28 NOTE — TELEPHONE ENCOUNTER
Faxed the [facility] paperwork for pt's daughter back in, as stated on the coversheet.   Daughter didn't complete her portion, but coversheet stated to fax back ASAP.

## 2019-03-29 ENCOUNTER — TELEPHONE (OUTPATIENT)
Dept: CARDIOLOGY | Facility: CLINIC | Age: 64
End: 2019-03-29

## 2019-03-29 NOTE — TELEPHONE ENCOUNTER
"Called the neuroradiology office at [phone], left a message stating pt's daughter is requesting a call to schedule, or if they can call our office back.     Pt's daughter didn't complete her portion of the [place] paperwork. Already faxed the paperwork to the number on the forms, as it stated to fax back ASAP; so daughter can contact them to complete her portion.        Informed pt's daughter of the above, she verbalized understanding.   She stated that pt has seen the specialist and they were told she was supposed to be seen in 2 weeks. Stated that she talked w/ his assistant, but they treated her like she was \"an idiot.\" \"They didn't seem very interested in dealing with me at all.\" Stated she wasn't at appt w/ pt and that pt said she was supposed to hear from someone. She asked if I will keep up w/ this.   I informed her that normally we refer a pt and we receive notes on what's happened, stated that we aren't really involved in their follow up appointments. Reiterated that I did call and leave a message, she verbalized understanding.         "

## 2019-03-29 NOTE — TELEPHONE ENCOUNTER
----- Message from Lucia Clarke sent at 3/29/2019 10:00 AM EDT -----  PTS DAUGHTER SAID PT SEEN A DR IN [place] THAT WE REFERRED HER TO (ELECTROPHYSILOGY) HE WANTED TO SEE HER BACK IN TWO WEEKS. THEY HAVE NOT GOT A CALL FROM THEIR OFFICE, SAID JOVANNY TRIED TO CALL THEIR OFFICE WHILE PT WAS HERE AND DID NOT REACH THEM. PTS DAUGHTER CALLED LEFT MESSAGE AND HAS NOT HEARD ANYTHING BACK. SHE WOULD LIKE FOR JOVANNY TO HANDLE THE CALL SINCE SHE FEELS LIKE SHE IS GETTING NO WEHRE.    ALSO PTS DAUGHTER (VERN) SAID SOMETHING LESE WAS NEEDED FOR THE [facility] PAPERWORK TO BE DONE AND WANTED TO KNOW WHAT THAT -[phone]

## 2019-04-17 ENCOUNTER — TELEPHONE (OUTPATIENT)
Dept: CARDIOLOGY | Facility: CLINIC | Age: 64
End: 2019-04-17

## 2019-04-17 NOTE — TELEPHONE ENCOUNTER
Pt was supposed to get repeat labs 2 weeks after telephone encounter on 3/27 for her [facility]. Still no lab results.       Spoke w/ pt and asked if she ever did her repeat labs for [facility]. Pt states she was never told. I told her that I mentioned repeat labs to her when I told her to adjust the way she takes her Lasix. Pt stated she was never told anything about her Lasix. Informed pt that I called her on 3/27 and told her to take Lasix PRN and get repeat labs in 2 weeks. Pt stated that she forgot and asked when to get repeat labs. I told her I need them done ASAP, she stated she would get them done at our lab tomorrow am.

## 2019-04-18 ENCOUNTER — APPOINTMENT (OUTPATIENT)
Dept: LAB | Facility: HOSPITAL | Age: 64
End: 2019-04-18

## 2019-04-22 ENCOUNTER — OUTSIDE FACILITY SERVICE (OUTPATIENT)
Dept: CARDIOLOGY | Facility: CLINIC | Age: 64
End: 2019-04-22

## 2019-04-22 PROCEDURE — 93458 L HRT ARTERY/VENTRICLE ANGIO: CPT | Performed by: INTERNAL MEDICINE

## 2019-04-22 PROCEDURE — 92928 PRQ TCAT PLMT NTRAC ST 1 LES: CPT | Performed by: INTERNAL MEDICINE

## 2019-04-29 ENCOUNTER — OFFICE VISIT (OUTPATIENT)
Dept: CARDIOLOGY | Facility: CLINIC | Age: 64
End: 2019-04-29

## 2019-04-29 VITALS
OXYGEN SATURATION: 96 % | WEIGHT: 193.4 LBS | BODY MASS INDEX: 36.51 KG/M2 | DIASTOLIC BLOOD PRESSURE: 85 MMHG | HEART RATE: 95 BPM | SYSTOLIC BLOOD PRESSURE: 138 MMHG | HEIGHT: 61 IN

## 2019-04-29 DIAGNOSIS — R60.9 PERIPHERAL EDEMA: ICD-10-CM

## 2019-04-29 DIAGNOSIS — I95.1 ORTHOSTATIC HYPOTENSION: ICD-10-CM

## 2019-04-29 DIAGNOSIS — R06.02 BREATH SHORTNESS: ICD-10-CM

## 2019-04-29 DIAGNOSIS — I95.1 HYPOTENSION, POSTURAL: ICD-10-CM

## 2019-04-29 DIAGNOSIS — I10 ESSENTIAL HYPERTENSION: ICD-10-CM

## 2019-04-29 DIAGNOSIS — I73.9 PERIPHERAL VASCULAR DISEASE (HCC): ICD-10-CM

## 2019-04-29 DIAGNOSIS — E78.00 HYPERCHOLESTEROLEMIA: ICD-10-CM

## 2019-04-29 DIAGNOSIS — I25.10 CAD IN NATIVE ARTERY: Primary | ICD-10-CM

## 2019-04-29 PROCEDURE — 99213 OFFICE O/P EST LOW 20 MIN: CPT | Performed by: NURSE PRACTITIONER

## 2019-04-29 RX ORDER — MIDODRINE HYDROCHLORIDE 2.5 MG/1
2.5 TABLET ORAL 3 TIMES DAILY
Qty: 270 TABLET | Refills: 3 | Status: SHIPPED | OUTPATIENT
Start: 2019-04-29 | End: 2020-02-28

## 2019-04-29 RX ORDER — FUROSEMIDE 20 MG/1
20 TABLET ORAL DAILY
Qty: 90 TABLET | Refills: 3 | Status: SHIPPED | OUTPATIENT
Start: 2019-04-29 | End: 2020-02-04 | Stop reason: SDUPTHER

## 2019-04-29 RX ORDER — FLUDROCORTISONE ACETATE 0.1 MG/1
0.1 TABLET ORAL 3 TIMES WEEKLY
Qty: 90 TABLET | Refills: 3 | Status: SHIPPED | OUTPATIENT
Start: 2019-04-29 | End: 2020-02-04 | Stop reason: SDUPTHER

## 2019-04-29 RX ORDER — CLOPIDOGREL BISULFATE 75 MG/1
75 TABLET ORAL DAILY
Qty: 90 TABLET | Refills: 3 | Status: SHIPPED | OUTPATIENT
Start: 2019-04-29 | End: 2020-06-29

## 2019-04-29 RX ORDER — ISOSORBIDE MONONITRATE 30 MG/1
30 TABLET, EXTENDED RELEASE ORAL DAILY
Qty: 90 TABLET | Refills: 3 | Status: SHIPPED | OUTPATIENT
Start: 2019-04-29 | End: 2020-02-04 | Stop reason: SDUPTHER

## 2019-04-29 RX ORDER — LOSARTAN POTASSIUM 100 MG/1
100 TABLET ORAL DAILY
Qty: 90 TABLET | Refills: 3 | Status: SHIPPED | OUTPATIENT
Start: 2019-04-29 | End: 2020-02-04 | Stop reason: SDUPTHER

## 2019-04-29 RX ORDER — POTASSIUM CHLORIDE 750 MG/1
10 CAPSULE, EXTENDED RELEASE ORAL DAILY
Qty: 90 CAPSULE | Refills: 3 | Status: SHIPPED | OUTPATIENT
Start: 2019-04-29 | End: 2020-02-04 | Stop reason: SDUPTHER

## 2019-04-29 RX ORDER — HYDROCHLOROTHIAZIDE 12.5 MG/1
12.5 TABLET ORAL DAILY
Qty: 90 TABLET | Refills: 3 | Status: SHIPPED | OUTPATIENT
Start: 2019-04-29 | End: 2020-02-04 | Stop reason: SDUPTHER

## 2019-04-29 NOTE — PROGRESS NOTES
Subjective   Barby Sue is a 64 y.o. female     Chief Complaint   Patient presents with   • Follow-up     Pt in office for 1wk cath follow up    • Coronary Artery Disease       HPI    Problem List:    1. Coronary artery disease  1.1 left heart cath 3/20/1 12-20% right coronary artery, circumflex 50-70% proximal, 20-30% first diagonal, EF 60%  1.2 Follow up stress August 2015 demonstrated chest wall attenuation but no sign of ischemia, preserved ER  1.3 Stress Test 12/14/16 - no ischemia; low risk  1.4 stress test 3/20/19-possible inferior basilar ischemia, preserved post stress EF of 83%, increased transient ischemic dilation ratio of 0.43  1.5 LHC 4/22/19 -stent proximal circumflex, EF 55 to 60%, LVEDP 16-18, no aortic valve gradient present on catheter pullback  2. PVD with history of aortobifemoral bypass 2014  2.1 BLE Arterial U/S 12/14/16 - < or equal to 50% right common femoral artery; high-grade stenosis left posterior tibial artery 50-75% stenosis  2.2 CTA Abd with runoff 5/16/17 - patent aorta bi-iliac graft, mild narrowing seen at prox aspect of the celiac trunk.  No high grade stenosis. Fatty infiltration of the liver.   3. Chronic hypotension   4. Hyperlipidemia   5. Carotid Artery Disease   5.1 Carotid Artery U/S 12/14/16 - 50-75% right external carotid; 16-49% KENNEDY and LICA; antegrade flow both vertebral arteries   5.2 Carotid Artery US 2/13/19 - LICA 50-69%; less than 50% on the right   5.3 CTA head 2/15/19 - left external carotid artery is occluded, no sig stenosis KENNEDY; left vertebral artery at arch is near occluded; enlarged precarinal node 1.4 x 1.5 and right of the midline measuring 1.1 x 1.6, emphysema   6. Echo 12/14/16 - mild LVH; EF > 65%; DD I; trace MR; physiological TR; mild MA  6.1 echo 3/20/19-mild LVH, EF 61-65%, diastolic dysfunction 1  7. COPD/Emphysema   8. Palpitations  8.1 Event Monitor 3/31-4/11/17 - NSR - ST    Patient is a 64-year-old female who presents today for follow-up  status post left heart cath with stent placement.  She denies any chest pain, pressure, palpitations, fluttering, dizziness, presyncope, syncope, orthopnea, PND or edema.  She says that since she has had a heart cath her fatigue and shortness of breath has improved.  She says she no longer has swelling in her legs.    We went over left heart cath.  Patient understands she is not to miss a dose of aspirin or Plavix for the next year.    Current Outpatient Medications   Medication Sig Dispense Refill   • aspirin 81 MG EC tablet Take 1 tablet by mouth daily.     • Biotin 1000 MCG tablet Take 1 tablet by mouth daily.     • BLACK COHOSH PO Take 1,000 mg by mouth daily.     • buPROPion XL (WELLBUTRIN XL) 150 MG 24 hr tablet Take 1 tablet by mouth Daily. 90 tablet 3   • CloNIDine (CATAPRES) 0.1 MG tablet Take 1 tablet by mouth Every 12 (Twelve) Hours. 180 tablet 3   • clopidogrel (PLAVIX) 75 MG tablet Take 1 tablet by mouth Daily. 30 tablet 11   • cyclobenzaprine (FLEXERIL) 10 MG tablet Take 1 tablet by mouth 3 (three) times a day.     • fludrocortisone 0.1 MG tablet Take 1 tablet by mouth 3 (Three) Times a Week. Take on Monday, Wednesday, and Friday. 90 tablet 3   • fluticasone (FLONASE) 50 MCG/ACT nasal spray   3   • furosemide (LASIX) 20 MG tablet Take 1 tablet by mouth Daily. 90 tablet 3   • gabapentin (NEURONTIN) 400 MG capsule Take 400 mg by mouth 3 (Three) Times a Day.     • hydrochlorothiazide (HYDRODIURIL) 12.5 MG tablet Take 12.5 mg by mouth Daily.  6   • isosorbide mononitrate (IMDUR) 30 MG 24 hr tablet Take 1 tablet by mouth Daily. 90 tablet 3   • losartan (COZAAR) 100 MG tablet Take 1 tablet by mouth Daily. 90 tablet 3   • metoprolol tartrate (LOPRESSOR) 25 MG tablet Take 1 tablet by mouth Every 12 (Twelve) Hours. 180 tablet 3   • midodrine (PROAMATINE) 2.5 MG tablet Take 1 tablet by mouth 3 (Three) Times a Day. 270 tablet 3   • nitroglycerin (NITROSTAT) 0.4 MG SL tablet Place 1 tablet under the tongue Every 5  (Five) Minutes As Needed for Chest Pain. Only take up to 3 tablets. Call 911 if pain persists. 25 tablet 5   • oxyCODONE-acetaminophen (PERCOCET)  MG per tablet Take 1 tablet by mouth every 8 (eight) hours as needed for moderate pain (4-6).     • pantoprazole (PROTONIX) 40 MG EC tablet Take 1 tablet by mouth Daily. 90 tablet 3   • potassium chloride (MICRO-K) 10 MEQ CR capsule Take 1 capsule by mouth Daily. 90 capsule 3   • rosuvastatin (CRESTOR) 40 MG tablet Take 1 tablet by mouth Daily. 90 tablet 3   • Umeclidinium Bromide 62.5 MCG/INH aerosol powder  Inhale 1 puff Daily. 90 each 3   • vitamin D (ERGOCALCIFEROL) 28468 UNITS capsule capsule 1 (One) Time Per Week.       No current facility-administered medications for this visit.        ALLERGIES    Lisinopril; Adhesive tape; Ibuprofen; and Latex    Past Medical History:   Diagnosis Date   • Anxiety    • Back pain    • Carotid bruit    • Chest pain    • COPD (chronic obstructive pulmonary disease) (CMS/HCC)    • Dyslipidemia    • Fainting    • Functional murmur    • GERD (gastroesophageal reflux disease)    • Hypercholesterolemia    • Hyperlipidemia    • Hypertension    • Hypokalemia    • Orthostatic hypotension    • Orthostatic hypotension    • Peripheral vascular disease (CMS/HCC)    • Peripheral vascular disease (CMS/HCC)     PVD with history of aortobifemoral bypass   • Shortness of breath    • Sleep apnea        Social History     Socioeconomic History   • Marital status:      Spouse name: Not on file   • Number of children: Not on file   • Years of education: Not on file   • Highest education level: Not on file   Tobacco Use   • Smoking status: Former Smoker     Packs/day: 0.50     Types: Cigarettes   • Smokeless tobacco: Never Used   • Tobacco comment: Pt states she stopped smoking 3/14/19   Substance and Sexual Activity   • Alcohol use: No   • Drug use: No   • Sexual activity: Defer       Family History   Problem Relation Age of Onset   • Heart  "failure Mother         Congestive   • Hypertension Mother    • Heart failure Sister         Congestive   • Coronary artery disease Sister    • Heart attack Sister         Acute   • Hypertension Sister    • Sudden death Sister    • Coronary artery disease Brother    • Seizures Brother    • Hypertension Brother    • Heart failure Maternal Grandmother         Congestive       Review of Systems   Constitutional: Positive for diaphoresis and fatigue (States she's still fatigued, but not as bad as she weas ).   HENT: Negative for congestion, rhinorrhea and sore throat.         States eyes have been very watery at night, states she wakes up and they're \"matted\"    Eyes: Positive for visual disturbance (glasses daily ).   Respiratory: Positive for shortness of breath (w/ activity ). Negative for chest tightness.    Cardiovascular: Negative for chest pain (Denies CP ), palpitations and leg swelling.   Gastrointestinal: Negative for abdominal pain, blood in stool, constipation, diarrhea, nausea and vomiting.   Endocrine: Negative for cold intolerance and heat intolerance.   Genitourinary: Negative for difficulty urinating, dysuria, frequency, hematuria and urgency.   Musculoskeletal: Positive for arthralgias and back pain. Negative for neck pain.   Skin: Negative for rash and wound.   Allergic/Immunologic: Negative for environmental allergies and food allergies.   Neurological: Negative for dizziness, syncope, weakness, light-headedness, numbness and headaches.   Hematological: Bruises/bleeds easily (bruises).   Psychiatric/Behavioral: Negative for sleep disturbance.       Objective   /85   Pulse 95   Ht 154.9 cm (61\")   Wt 87.7 kg (193 lb 6.4 oz)   SpO2 96%   BMI 36.54 kg/m²   Vitals:    04/29/19 1306   BP: 138/85   Pulse: 95   SpO2: 96%   Weight: 87.7 kg (193 lb 6.4 oz)   Height: 154.9 cm (61\")      Lab Results (most recent)     None        Physical Exam   Constitutional: She is oriented to person, place, and " time. Vital signs are normal. She appears well-developed and well-nourished. She is active and cooperative.   HENT:   Head: Normocephalic.   Eyes: Lids are normal.   Wears glasses    Neck: Normal carotid pulses, no hepatojugular reflux and no JVD present. Carotid bruit is not present.   Cardiovascular: Normal rate, regular rhythm and normal heart sounds.   Pulses:       Radial pulses are 2+ on the right side, and 2+ on the left side.        Dorsalis pedis pulses are 2+ on the right side, and 2+ on the left side.        Posterior tibial pulses are 2+ on the right side, and 2+ on the left side.   No edema BLE.    Pulmonary/Chest: Effort normal and breath sounds normal.   Abdominal: Normal appearance and bowel sounds are normal.   Neurological: She is alert and oriented to person, place, and time.   Skin: Skin is warm, dry and intact. Bruising (right wrist ) noted.   Psychiatric: She has a normal mood and affect. Her speech is normal and behavior is normal. Judgment and thought content normal. Cognition and memory are normal.       Procedure   Procedures         Assessment/Plan      Diagnosis Plan   1. CAD in native artery     2. Hypercholesterolemia     3. Orthostatic hypotension     4. Peripheral vascular disease (CMS/HCC)     5. Breath shortness         Return in about 3 months (around 7/29/2019).    CAD-patient is on aspirin, Evex, beta and statin.  Hyperlipidemia-patient is on Crestor monitor by PCP.  Orthostatic hypotension-stable.  Peripheral vascular disease-patient on aspirin and statin.  Shortness of breath-improved.  She will continue her medication regimen.  When she returns in 3 months we will try to get her off of her isosorbide.  She will follow-up in 3 months or sooner if any changes.         Patient's Body mass index is 36.54 kg/m². BMI is above normal parameters. Recommendations include: educational material.      Electronically signed by:

## 2019-04-29 NOTE — PATIENT INSTRUCTIONS
"Fat and Cholesterol Restricted Eating Plan  Getting too much fat and cholesterol in your diet may cause health problems. Choosing the right foods helps keep your fat and cholesterol at normal levels. This can keep you from getting certain diseases.  Your doctor may recommend an eating plan that includes:  · Total fat: ______% or less of total calories a day.  · Saturated fat: ______% or less of total calories a day.  · Cholesterol: less than _________mg a day.  · Fiber: ______g a day.    What are tips for following this plan?  General tips  · Work with your doctor to lose weight if you need to.  · Avoid:  ? Foods with added sugar.  ? Fried foods.  ? Foods with partially hydrogenated oils.  · Limit alcohol intake to no more than 1 drink a day for nonpregnant women and 2 drinks a day for men. One drink equals 12 oz of beer, 5 oz of wine, or 1½ oz of hard liquor.  Reading food labels  · Check food labels for:  ? Trans fats.  ? Partially hydrogenated oils.  ? Saturated fat (g) in each serving.  ? Cholesterol (mg) in each serving.  ? Fiber (g) in each serving.  · Choose foods with healthy fats, such as:  ? Monounsaturated fats.  ? Polyunsaturated fats.  ? Omega-3 fats.  · Choose grain products that have whole grains. Look for the word \"whole\" as the first word in the ingredient list.  Cooking  · Cook foods using low-fat methods. These include baking, boiling, grilling, and broiling.  · Eat more home-cooked foods. Eat at restaurants and buffets less often.  · Avoid cooking using saturated fats, such as butter, cream, palm oil, palm kernel oil, and coconut oil.  Meal planning  · At meals, divide your plate into four equal parts:  ? Fill one-half of your plate with vegetables and green salads.  ? Fill one-fourth of your plate with whole grains.  ? Fill one-fourth of your plate with low-fat (lean) protein foods.  · Eat fish that is high in omega-3 fats at least two times a week. This includes mackerel, tuna, sardines, and " salmon.  · Eat foods that are high in fiber, such as whole grains, beans, apples, broccoli, carrots, peas, and barley.  Recommended foods  Grains  · Whole grains, such as whole wheat or whole grain breads, crackers, cereals, and pasta. Unsweetened oatmeal, bulgur, barley, quinoa, or brown rice. Corn or whole wheat flour tortillas.  Vegetables  · Fresh or frozen vegetables (raw, steamed, roasted, or grilled). Green salads.  Fruits  · All fresh, canned (in natural juice), or frozen fruits.  Meats and other protein foods  · Ground beef (85% or leaner), grass-fed beef, or beef trimmed of fat. Skinless chicken or turkey. Ground chicken or turkey. Pork trimmed of fat. All fish and seafood. Egg whites. Dried beans, peas, or lentils. Unsalted nuts or seeds. Unsalted canned beans. Nut butters without added sugar or oil.  Dairy  · Low-fat or nonfat dairy products, such as skim or 1% milk, 2% or reduced-fat cheeses, low-fat and fat-free ricotta or cottage cheese, or plain low-fat and nonfat yogurt.  Fats and oils  · Tub margarine without trans fats. Light or reduced-fat mayonnaise and salad dressings. Avocado. Olive, canola, sesame, or safflower oils.  The items listed above may not be a complete list of recommended foods or beverages. Contact your dietitian for more options.  Foods to avoid  Grains  · White bread. White pasta. White rice. Cornbread. Bagels, pastries, and croissants. Crackers and snack foods that contain trans fat and hydrogenated oils.  Vegetables  · Vegetables cooked in cheese, cream, or butter sauce. Fried vegetables.  Fruits  · Canned fruit in heavy syrup. Fruit in cream or butter sauce. Fried fruit.  Meats and other protein foods  · Fatty cuts of meat. Ribs, chicken wings, saenz, sausage, bologna, salami, chitterlings, fatback, hot dogs, bratwurst, and packaged lunch meats. Liver and organ meats. Whole eggs and egg yolks. Chicken and turkey with skin. Fried meat.  Dairy  · Whole or 2% milk, cream,  half-and-half, and cream cheese. Whole milk cheeses. Whole-fat or sweetened yogurt. Full-fat cheeses. Nondairy creamers and whipped toppings. Processed cheese, cheese spreads, and cheese curds.  Beverages  · Alcohol. Sugar-sweetened drinks such as sodas, lemonade, and fruit drinks.  Fats and oils  · Butter, stick margarine, lard, shortening, ghee, or saenz fat. Coconut, palm kernel, and palm oils.  Sweets and desserts  · Corn syrup, sugars, honey, and molasses. Candy. Jam and jelly. Syrup. Sweetened cereals. Cookies, pies, cakes, donuts, muffins, and ice cream.  The items listed above may not be a complete list of foods and beverages to avoid. Contact your dietitian for more information.  Summary  · Choosing the right foods helps keep your fat and cholesterol at normal levels. This can keep you from getting certain diseases.  · At meals, fill one-half of your plate with vegetables and green salads.  · Eat high-fiber foods, like whole grains, beans, apples, carrots, peas, and barley.  · Limit added sugar, saturated fats, alcohol, and fried foods.  This information is not intended to replace advice given to you by your health care provider. Make sure you discuss any questions you have with your health care provider.  Document Released: 06/18/2013 Document Revised: 09/04/2018 Document Reviewed: 09/04/2018  [employer] Interactive Patient Education © 2019 [employer]  BMI for Adults  Body mass index (BMI) is a number that is calculated from a person's weight and height. In most adults, the number is used to find how much of an adult's weight is made up of fat. BMI is not as accurate as a direct measure of body fat.  How is BMI calculated?  BMI is calculated by dividing weight in kilograms by height in meters squared. It can also be calculated by dividing weight in pounds by height in inches squared, then multiplying the resulting number by 703. Charts are available to help you find your BMI quickly and easily without  doing this calculation.  How is BMI interpreted?  Health care professionals use BMI charts to identify whether an adult is underweight, at a normal weight, or overweight based on the following guidelines:  · Underweight: BMI less than 18.5.  · Normal weight: BMI between 18.5 and 24.9.  · Overweight: BMI between 25 and 29.9.  · Obese: BMI of 30 and above.    BMI is usually interpreted the same for males and females.  Weight includes both fat and muscle, so someone with a muscular build, such as an athlete, may have a BMI that is higher than 24.9. In cases like these, BMI may not accurately depict body fat. To determine if excess body fat is the cause of a BMI of 25 or higher, further assessments may need to be done by a health care provider.  Why is BMI a useful tool?  BMI is used to identify a possible weight problem that may be related to a medical problem or may increase the risk for medical problems. BMI can also be used to promote changes to reach a healthy weight.  This information is not intended to replace advice given to you by your health care provider. Make sure you discuss any questions you have with your health care provider.  Document Released: 08/29/2005 Document Revised: 04/27/2017 Document Reviewed: 05/15/2015  [email or web address] Interactive Patient Education © 2018 [email or web address]    Coronary Artery Disease, Female  Coronary artery disease (CAD) is a condition in which the arteries that lead to the heart (coronary arteries) become narrow or blocked. The narrowing or blockage can lead to decreased blood flow to the heart. Prolonged reduced blood flow can cause a heart attack (myocardial infarction or MI). This condition may also be called coronary heart disease.  Because CAD is the leading cause of death in women, it is important to understand what causes this condition and how it is treated.  What are the causes?  CAD is most often caused by atherosclerosis. This is the buildup of fat and cholesterol (plaque)  on the inside of the arteries. Over time, the plaque may narrow or block the artery, reducing blood flow to the heart. Plaque can also become weak and break off within a coronary artery and cause a sudden blockage. Other less common causes of CAD include:  · An embolism or blood clot in a coronary artery.  · A tearing of the artery (spontaneous coronary artery dissection).  · An aneurysm.  · Inflammation (vasculitis) in the artery wall.    What increases the risk?  The following factors may make you more likely to develop this condition:  · Age. Women over age 55 are at a greater risk of CAD.  · Family history of CAD.  · High blood pressure (hypertension).  · Diabetes.  · High cholesterol levels.  · Tobacco use.  · Lack of exercise.  · Menopause.  ? All postmenopausal women are at greater risk of CAD.  ? Women who have experienced menopause between the ages of 40-45 (early menopause) are at a higher risk of CAD.  ? Women who have experienced menopause before age 40 (premature menopause) are at a very high risk of CAD.  · Excessive alcohol use  · A diet high in saturated and trans fats, such as fried food and processed meat.    Other possible risk factors include:  · High stress levels.  · Depression  · Obesity.  · Sleep apnea.    What are the signs or symptoms?  Many people do not have any symptoms during the early stages of CAD. As the condition progresses, symptoms may include:  · Chest pain (angina). The pain can:  ? Feel like crushing or squeezing, or a tightness, pressure, fullness, or heaviness in the chest.  ? Last more than a few minutes or can stop and recur. The pain tends to get worse with exercise or stress and to fade with rest.  · Pain in the arms, neck, jaw, or back.  · Unexplained heartburn or indigestion.  · Shortness of breath.  · Nausea.  · Sudden cold sweats.  · Sudden light-headedness.  · Fluttering or fast heartbeat (palpitations).    Many women have chest discomfort and the other symptoms.  However, women often have unusual (atypical) symptoms, such as:  · Fatigue.  · Vomiting.  · Unexplained feelings of nervousness or anxiety.  · Unexplained weakness.  · Dizziness or fainting.    How is this diagnosed?  This condition is diagnosed based on:  · Your family and medical history.  · A physical exam.  · Tests, including:  ? A test to check the electrical signals in your heart (electrocardiogram).  ? Exercise stress test. This looks for signs of blockage when the heart is stressed with exercise, such as running on a treadmill.  ? Pharmacologic stress test. This test looks for signs of blockage when the heart is being stressed with a medicine.  ? Blood tests.  ? Coronary angiogram. This is a procedure to look at the coronary arteries to see if there is any blockage. During this test, a dye is injected into your arteries so they appear on an X-ray.  ? A test that uses sound waves to take a picture of your heart (echocardiogram).  ? Chest X-ray.    How is this treated?  This condition may be treated by:  · Healthy lifestyle changes to reduce risk factors.  · Medicines such as:  ? Antiplatelet medicines and blood-thinning medicines, such as aspirin. These help prevent blood clots.  ? Nitroglycerin.  ? Blood pressure medicines.  ? Cholesterol-lowering medicine.  · Coronary angioplasty and stenting. During this procedure, a thin, flexible tube is inserted through a blood vessel and into a blocked artery. A balloon or similar device on the end of the tube is inflated to open up the artery. In some cases, a small, mesh tube (stent) is inserted into the artery to keep it open.  · Coronary artery bypass surgery. During this surgery, veins or arteries from other parts of the body are used to create a bypass around the blockage and allow blood to reach your heart.    Follow these instructions at home:  Medicines  · Take over-the-counter and prescription medicines only as told by your health care provider.  · Do not  take the following medicines unless your health care provider approves:  ? NSAIDs, such as ibuprofen, naproxen, or celecoxib.  ? Vitamin supplements that contain vitamin A, vitamin E, or both.  ? Hormone replacement therapy that contains estrogen with or without progestin.  Lifestyle  · Follow an exercise program approved by your health care provider. Aim for 150 minutes of moderate exercise or 75 minutes of vigorous exercise each week.  · Maintain a healthy weight or lose weight as approved by your health care provider.  · Rest when you are tired.  · Learn to manage stress or try to limit your stress. Ask your health care provider for suggestions if you need help.  · Get screened for depression and seek treatment, if needed.  · Do not use any products that contain nicotine or tobacco, such as cigarettes and e-cigarettes. If you need help quitting, ask your health care provider.  · Do not use illegal drugs.  Eating and drinking  · Follow a heart-healthy diet. A dietitian can help educate you about healthy food options and changes. In general, eat plenty of fruits and vegetables, lean meats, and whole grains.  · Avoid foods high in:  ? Sugar.  ? Salt (sodium).  ? Saturated fats, such as processed or fatty meat.  ? Trans fats, such as fried food.  · Use healthy cooking methods such as roasting, grilling, broiling, baking, poaching, steaming, or stir-frying.  · If you drink alcohol, and your health care provider approves, limit your alcohol intake to no more than 1 drink per day. One drink equals 12 ounces of beer, 5 ounces of wine, or 1½ ounces of hard liquor.  General instructions  · Manage any other health conditions, such as hypertension and diabetes. These conditions affect your heart.  · Your health care provider may ask you to monitor your blood pressure. Ideally, your blood pressure should be below 130/80.  · Keep all follow-up visits as told by your health care provider. This is important.  Get help right away  if:  · You have pain in your chest, neck, arm, jaw, stomach, or back that:  ? Lasts more than a few minutes.  ? Is recurring.  ? Is not relieved by taking medicine under your tongue (sublingualnitroglycerin).  · You have profuse sweating without cause.  · You have unexplained:  ? Heartburn or indigestion.  ? Shortness of breath or difficulty breathing.  ? Fluttering or fast heartbeat (palpitations).  ? Nausea or vomiting.  ? Fatigue.  ? Feelings of nervousness or anxiety.  ? Weakness.  ? Diarrhea.  · You have sudden light-headedness or dizziness.  · You faint.  · You feel like hurting yourself or think about taking your own life.  These symptoms may represent a serious problem that is an emergency. Do not wait to see if the symptoms will go away. Get medical help right away. Call your local emergency services (911 in the [place] Do not drive yourself to the hospital.  Summary  · Coronary artery disease (CAD) is a process in which the arteries that lead to the heart (coronary arteries) become narrow or blocked. The narrowing or blockage can lead to a heart attack.  · Many women have chest discomfort and other common symptoms of CAD. However, women often have different (atypical) symptoms, such as fatigue, vomiting, and dizziness or weakness.  · CAD can be treated with lifestyle changes, medicines, surgery, or a combination of these treatments.  This information is not intended to replace advice given to you by your health care provider. Make sure you discuss any questions you have with your health care provider.  Document Released: 03/11/2013 Document Revised: 12/08/2017 Document Reviewed: 12/08/2017  [employer] Interactive Patient Education © 2019 [employer]

## 2019-05-01 ENCOUNTER — TELEPHONE (OUTPATIENT)
Dept: CARDIOLOGY | Facility: CLINIC | Age: 64
End: 2019-05-01

## 2019-05-01 NOTE — TELEPHONE ENCOUNTER
----- Message from Uma Mello sent at 4/23/2019  8:17 AM EDT -----  Pt was stented during her LHC and insurance dependant may rq a retro auth.

## 2019-05-01 NOTE — TELEPHONE ENCOUNTER
Spoke hina/Uma, she stated that since Medicare is primary insurance that retro auth shouldn't be needed.

## 2019-08-01 ENCOUNTER — OFFICE VISIT (OUTPATIENT)
Dept: CARDIOLOGY | Facility: CLINIC | Age: 64
End: 2019-08-01

## 2019-08-01 VITALS
WEIGHT: 192.8 LBS | DIASTOLIC BLOOD PRESSURE: 83 MMHG | BODY MASS INDEX: 36.4 KG/M2 | HEART RATE: 96 BPM | OXYGEN SATURATION: 96 % | HEIGHT: 61 IN | SYSTOLIC BLOOD PRESSURE: 137 MMHG

## 2019-08-01 DIAGNOSIS — I25.10 CAD IN NATIVE ARTERY: Primary | ICD-10-CM

## 2019-08-01 DIAGNOSIS — I95.1 ORTHOSTATIC HYPOTENSION: ICD-10-CM

## 2019-08-01 DIAGNOSIS — E78.5 DYSLIPIDEMIA: ICD-10-CM

## 2019-08-01 DIAGNOSIS — I10 ESSENTIAL HYPERTENSION: ICD-10-CM

## 2019-08-01 DIAGNOSIS — E78.00 HYPERCHOLESTEROLEMIA: ICD-10-CM

## 2019-08-01 DIAGNOSIS — I73.9 PERIPHERAL VASCULAR DISEASE (HCC): ICD-10-CM

## 2019-08-01 DIAGNOSIS — R06.02 SHORTNESS OF BREATH: ICD-10-CM

## 2019-08-01 PROCEDURE — 99213 OFFICE O/P EST LOW 20 MIN: CPT | Performed by: NURSE PRACTITIONER

## 2019-08-01 RX ORDER — ESCITALOPRAM OXALATE 10 MG/1
10 TABLET ORAL DAILY
Refills: 0 | COMMUNITY
Start: 2019-06-01 | End: 2022-12-13

## 2019-08-01 RX ORDER — DOCUSATE SODIUM 100 MG/1
100 CAPSULE, LIQUID FILLED ORAL AS NEEDED
Refills: 5 | COMMUNITY
Start: 2019-07-27 | End: 2022-12-13

## 2019-08-01 RX ORDER — BUSPIRONE HYDROCHLORIDE 7.5 MG/1
7.5 TABLET ORAL 2 TIMES DAILY
Refills: 0 | COMMUNITY
Start: 2019-06-19 | End: 2020-12-15

## 2019-08-01 NOTE — PATIENT INSTRUCTIONS
"Fat and Cholesterol Restricted Eating Plan  Getting too much fat and cholesterol in your diet may cause health problems. Choosing the right foods helps keep your fat and cholesterol at normal levels. This can keep you from getting certain diseases.  Your doctor may recommend an eating plan that includes:  · Total fat: ______% or less of total calories a day.  · Saturated fat: ______% or less of total calories a day.  · Cholesterol: less than _________mg a day.  · Fiber: ______g a day.  What are tips for following this plan?  General tips    · Work with your doctor to lose weight if you need to.  · Avoid:  ? Foods with added sugar.  ? Fried foods.  ? Foods with partially hydrogenated oils.  · Limit alcohol intake to no more than 1 drink a day for nonpregnant women and 2 drinks a day for men. One drink equals 12 oz of beer, 5 oz of wine, or 1½ oz of hard liquor.  Reading food labels  · Check food labels for:  ? Trans fats.  ? Partially hydrogenated oils.  ? Saturated fat (g) in each serving.  ? Cholesterol (mg) in each serving.  ? Fiber (g) in each serving.  · Choose foods with healthy fats, such as:  ? Monounsaturated fats.  ? Polyunsaturated fats.  ? Omega-3 fats.  · Choose grain products that have whole grains. Look for the word \"whole\" as the first word in the ingredient list.  Cooking  · Cook foods using low-fat methods. These include baking, boiling, grilling, and broiling.  · Eat more home-cooked foods. Eat at restaurants and buffets less often.  · Avoid cooking using saturated fats, such as butter, cream, palm oil, palm kernel oil, and coconut oil.  Meal planning    · At meals, divide your plate into four equal parts:  ? Fill one-half of your plate with vegetables and green salads.  ? Fill one-fourth of your plate with whole grains.  ? Fill one-fourth of your plate with low-fat (lean) protein foods.  · Eat fish that is high in omega-3 fats at least two times a week. This includes mackerel, tuna, sardines, and " salmon.  · Eat foods that are high in fiber, such as whole grains, beans, apples, broccoli, carrots, peas, and barley.  Recommended foods  Grains  · Whole grains, such as whole wheat or whole grain breads, crackers, cereals, and pasta. Unsweetened oatmeal, bulgur, barley, quinoa, or brown rice. Corn or whole wheat flour tortillas.  Vegetables  · Fresh or frozen vegetables (raw, steamed, roasted, or grilled). Green salads.  Fruits  · All fresh, canned (in natural juice), or frozen fruits.  Meats and other protein foods  · Ground beef (85% or leaner), grass-fed beef, or beef trimmed of fat. Skinless chicken or turkey. Ground chicken or turkey. Pork trimmed of fat. All fish and seafood. Egg whites. Dried beans, peas, or lentils. Unsalted nuts or seeds. Unsalted canned beans. Nut butters without added sugar or oil.  Dairy  · Low-fat or nonfat dairy products, such as skim or 1% milk, 2% or reduced-fat cheeses, low-fat and fat-free ricotta or cottage cheese, or plain low-fat and nonfat yogurt.  Fats and oils  · Tub margarine without trans fats. Light or reduced-fat mayonnaise and salad dressings. Avocado. Olive, canola, sesame, or safflower oils.  The items listed above may not be a complete list of recommended foods or beverages. Contact your dietitian for more options.  The items listed above may not be a complete list of foods and beverages [you/your child] can eat. Contact a dietitian for more information.  Foods to avoid  Grains  · White bread. White pasta. White rice. Cornbread. Bagels, pastries, and croissants. Crackers and snack foods that contain trans fat and hydrogenated oils.  Vegetables  · Vegetables cooked in cheese, cream, or butter sauce. Fried vegetables.  Fruits  · Canned fruit in heavy syrup. Fruit in cream or butter sauce. Fried fruit.  Meats and other protein foods  · Fatty cuts of meat. Ribs, chicken wings, saenz, sausage, bologna, salami, chitterlings, fatback, hot dogs, bratwurst, and packaged  lunch meats. Liver and organ meats. Whole eggs and egg yolks. Chicken and turkey with skin. Fried meat.  Dairy  · Whole or 2% milk, cream, half-and-half, and cream cheese. Whole milk cheeses. Whole-fat or sweetened yogurt. Full-fat cheeses. Nondairy creamers and whipped toppings. Processed cheese, cheese spreads, and cheese curds.  Beverages  · Alcohol. Sugar-sweetened drinks such as sodas, lemonade, and fruit drinks.  Fats and oils  · Butter, stick margarine, lard, shortening, ghee, or saenz fat. Coconut, palm kernel, and palm oils.  Sweets and desserts  · Corn syrup, sugars, honey, and molasses. Candy. Jam and jelly. Syrup. Sweetened cereals. Cookies, pies, cakes, donuts, muffins, and ice cream.  The items listed above may not be a complete list of foods and beverages to avoid. Contact your dietitian for more information.  The items listed above may not be a complete list of foods and beverages [you/your child] should avoid. Contact a dietitian for more information.  Summary  · Choosing the right foods helps keep your fat and cholesterol at normal levels. This can keep you from getting certain diseases.  · At meals, fill one-half of your plate with vegetables and green salads.  · Eat high-fiber foods, like whole grains, beans, apples, carrots, peas, and barley.  · Limit added sugar, saturated fats, alcohol, and fried foods.  This information is not intended to replace advice given to you by your health care provider. Make sure you discuss any questions you have with your health care provider.  Document Released: 06/18/2013 Document Revised: 09/04/2018 Document Reviewed: 09/04/2018  [employer] Interactive Patient Education © 2019 [employer]  BMI for Adults    Body mass index (BMI) is a number that is calculated from a person's weight and height. BMI may help to estimate how much of a person's weight is composed of fat. BMI can help identify those who may be at higher risk for certain medical problems.  How is BMI  "used with adults?  BMI is used as a screening tool to identify possible weight problems. It is used to check whether a person is obese, overweight, healthy weight, or underweight.  How is BMI calculated?  BMI measures your weight and compares it to your height. This can be done either in English ([place] or metric measurements. Note that charts are available to help you find your BMI quickly and easily without having to do these calculations yourself.  To calculate your BMI in English ([place] measurements, your health care provider will:  1. Measure your weight in pounds (lb).  2. Multiply the number of pounds by 703.  ? For example, for a person who weighs 180 lb, multiply that number by 703, which equals [phone],540.  3. Measure your height in inches (in). Then multiply that number by itself to get a measurement called \"inches squared.\"  ? For example, for a person who is 70 in tall, the \"inches squared\" measurement is 70 in x 70 in, which equals 4900 inches squared.  4. Divide the total from Step 2 (number of lb x 703) by the total from Step 3 (inches squared): 126,540 ÷ 4900 = 25.8. This is your BMI.  To calculate your BMI in metric measurements, your health care provider will:  1. Measure your weight in kilograms (kg).  2. Measure your height in meters (m). Then multiply that number by itself to get a measurement called \"meters squared.\"  ? For example, for a person who is 1.75 m tall, the \"meters squared\" measurement is 1.75 m x 1.75 m, which is equal to 3.1 meters squared.  3. Divide the number of kilograms (your weight) by the meters squared number. In this example: 70 ÷ 3.1 = 22.6. This is your BMI.  How is BMI interpreted?  To interpret your results, your health care provider will use BMI charts to identify whether you are underweight, normal weight, overweight, or obese. The following guidelines will be used:  · Underweight: BMI less than 18.5.  · Normal weight: BMI between 18.5 and 24.9.  · Overweight: BMI " between 25 and 29.9.  · Obese: BMI of 30 and above.  Please note:  · Weight includes both fat and muscle, so someone with a muscular build, such as an athlete, may have a BMI that is higher than 24.9. In cases like these, BMI is not an accurate measure of body fat.  · To determine if excess body fat is the cause of a BMI of 25 or higher, further assessments may need to be done by a health care provider.  · BMI is usually interpreted in the same way for men and women.  Why is BMI a useful tool?  BMI is useful in two ways:  · Identifying a weight problem that may be related to a medical condition, or that may increase the risk for medical problems.  · Promoting lifestyle and diet changes in order to reach a healthy weight.  Summary  · Body mass index (BMI) is a number that is calculated from a person's weight and height.  · BMI may help to estimate how much of a person's weight is composed of fat. BMI can help identify those who may be at higher risk for certain medical problems.  · BMI can be measured using English measurements or metric measurements.  · To interpret your results, your health care provider will use BMI charts to identify whether you are underweight, normal weight, overweight, or obese.  This information is not intended to replace advice given to you by your health care provider. Make sure you discuss any questions you have with your health care provider.  Document Released: 08/29/2005 Document Revised: 10/31/2018 Document Reviewed: 10/31/2018  [employer] Interactive Patient Education © 2019 [employer]

## 2019-08-01 NOTE — PROGRESS NOTES
Subjective   Barby Sue is a 64 y.o. female     Chief Complaint   Patient presents with   • Follow-up     Pt in office for 3mth follow up appt    • Coronary Artery Disease       HPI    Problem List:    1. Coronary artery disease  1.1 left heart cath 3/20/1 12-20% right coronary artery, circumflex 50-70% proximal, 20-30% first diagonal, EF 60%  1.2 Follow up stress August 2015 demonstrated chest wall attenuation but no sign of ischemia, preserved ER  1.3 Stress Test 12/14/16 - no ischemia; low risk  1.4 stress test 3/20/19-possible inferior basilar ischemia, preserved post stress EF of 83%, increased transient ischemic dilation ratio of 0.43  1.5 LHC 4/22/19 -stent proximal circumflex, EF 55 to 60%, LVEDP 16-18, no aortic valve gradient present on catheter pullback  2. PVD with history of aortobifemoral bypass 2014  2.1 BLE Arterial U/S 12/14/16 - < or equal to 50% right common femoral artery; high-grade stenosis left posterior tibial artery 50-75% stenosis  2.2 CTA Abd with runoff 5/16/17 - patent aorta bi-iliac graft, mild narrowing seen at prox aspect of the celiac trunk.  No high grade stenosis. Fatty infiltration of the liver.   3. Chronic hypotension   4. Hyperlipidemia   5. Carotid Artery Disease   5.1 Carotid Artery U/S 12/14/16 - 50-75% right external carotid; 16-49% KENNEDY and LICA; antegrade flow both vertebral arteries   5.2 Carotid Artery US 2/13/19 - LICA 50-69%; less than 50% on the right   5.3 CTA head 2/15/19 - left external carotid artery is occluded, no sig stenosis KENNEDY; left vertebral artery at arch is near occluded; enlarged precarinal node 1.4 x 1.5 and right of the midline measuring 1.1 x 1.6, emphysema   6. Echo 12/14/16 - mild LVH; EF > 65%; DD I; trace MR; physiological TR; mild SD  6.1 echo 3/20/19-mild LVH, EF 61-65%, diastolic dysfunction 1  7. COPD/Emphysema   8. Palpitations  8.1 Event Monitor 3/31-4/11/17 - NSR - ST    Patient is a 64-year-old female who presents today for a  follow-up.  She denies any chest pain, pressure, palpitations, fluttering, dizziness, presyncope, syncope, orthopnea, PND or edema.  She says that she has shortness of breath with activity which she relates to her COPD.  She says she is felt wonderful since she had her stent placed.  She says she stays very active and has been doing a lot of radha.  PCP monitors her cholesterol.    Current Outpatient Medications   Medication Sig Dispense Refill   • aspirin 81 MG EC tablet Take 1 tablet by mouth daily.     • Biotin 1000 MCG tablet Take 1 tablet by mouth daily.     • BLACK COHOSH PO Take 1,000 mg by mouth daily.     • buPROPion XL (WELLBUTRIN XL) 150 MG 24 hr tablet Take 1 tablet by mouth Daily. 90 tablet 3   • CloNIDine (CATAPRES) 0.1 MG tablet Take 1 tablet by mouth Every 12 (Twelve) Hours. 180 tablet 3   • clopidogrel (PLAVIX) 75 MG tablet Take 1 tablet by mouth Daily. 90 tablet 3   • cyclobenzaprine (FLEXERIL) 10 MG tablet Take 1 tablet by mouth 3 (three) times a day.     • fludrocortisone 0.1 MG tablet Take 1 tablet by mouth 3 (Three) Times a Week. Take on Monday, Wednesday, and Friday. 90 tablet 3   • fluticasone (FLONASE) 50 MCG/ACT nasal spray   3   • furosemide (LASIX) 20 MG tablet Take 1 tablet by mouth Daily. 90 tablet 3   • gabapentin (NEURONTIN) 400 MG capsule Take 400 mg by mouth 3 (Three) Times a Day.     • hydrochlorothiazide (HYDRODIURIL) 12.5 MG tablet Take 1 tablet by mouth Daily. 90 tablet 3   • isosorbide mononitrate (IMDUR) 30 MG 24 hr tablet Take 1 tablet by mouth Daily. 90 tablet 3   • losartan (COZAAR) 100 MG tablet Take 1 tablet by mouth Daily. 90 tablet 3   • metoprolol tartrate (LOPRESSOR) 25 MG tablet Take 1 tablet by mouth Every 12 (Twelve) Hours. 180 tablet 3   • midodrine (PROAMATINE) 2.5 MG tablet Take 1 tablet by mouth 3 (Three) Times a Day. 270 tablet 3   • nitroglycerin (NITROSTAT) 0.4 MG SL tablet Place 1 tablet under the tongue Every 5 (Five) Minutes As Needed for Chest Pain.  Only take up to 3 tablets. Call 911 if pain persists. 25 tablet 5   • oxyCODONE-acetaminophen (PERCOCET)  MG per tablet Take 1 tablet by mouth every 8 (eight) hours as needed for moderate pain (4-6).     • pantoprazole (PROTONIX) 40 MG EC tablet Take 1 tablet by mouth Daily. 90 tablet 3   • potassium chloride (MICRO-K) 10 MEQ CR capsule Take 1 capsule by mouth Daily. (Patient taking differently: Take 20 mEq by mouth Daily.) 90 capsule 3   • rosuvastatin (CRESTOR) 40 MG tablet Take 1 tablet by mouth Daily. 90 tablet 3   • Umeclidinium Bromide 62.5 MCG/INH aerosol powder  Inhale 1 puff Daily. 90 each 3   • vitamin D (ERGOCALCIFEROL) 59519 UNITS capsule capsule 1 (One) Time Per Week.     • busPIRone (BUSPAR) 7.5 MG tablet Take 7.5 mg by mouth 2 (Two) Times a Day.  0   • docusate sodium (COLACE) 100 MG capsule Take 100 mg by mouth As Needed.  5   • escitalopram (LEXAPRO) 10 MG tablet Take 10 mg by mouth Daily.  0     No current facility-administered medications for this visit.        ALLERGIES    Lisinopril; Adhesive tape; Ibuprofen; and Latex    Past Medical History:   Diagnosis Date   • Anxiety    • Back pain    • Carotid bruit    • Chest pain    • COPD (chronic obstructive pulmonary disease) (CMS/HCC)    • Dyslipidemia    • Fainting    • Functional murmur    • GERD (gastroesophageal reflux disease)    • Hypercholesterolemia    • Hyperlipidemia    • Hypertension    • Hypokalemia    • Orthostatic hypotension    • Orthostatic hypotension    • Peripheral vascular disease (CMS/[facility])    • Peripheral vascular disease (CMS/[facility])     PVD with history of aortobifemoral bypass   • Shortness of breath    • Sleep apnea        Social History     Socioeconomic History   • Marital status:      Spouse name: Not on file   • Number of children: Not on file   • Years of education: Not on file   • Highest education level: Not on file   Tobacco Use   • Smoking status: Former Smoker     Packs/day: 0.50     Types: Cigarettes   •  "Smokeless tobacco: Never Used   • Tobacco comment: Pt states she stopped smoking 3/14/19   Substance and Sexual Activity   • Alcohol use: No   • Drug use: No   • Sexual activity: Defer       Family History   Problem Relation Age of Onset   • Heart failure Mother         Congestive   • Hypertension Mother    • Heart failure Sister         Congestive   • Coronary artery disease Sister    • Heart attack Sister         Acute   • Hypertension Sister    • Sudden death Sister    • Coronary artery disease Brother    • Seizures Brother    • Hypertension Brother    • Heart failure Maternal Grandmother         Congestive       Review of Systems   Constitutional: Negative for diaphoresis and fatigue.   HENT: Negative for congestion, rhinorrhea and sore throat.    Eyes: Positive for visual disturbance (glasses daily ).   Respiratory: Positive for shortness of breath (w/ normal daily activity- COPD  ). Negative for chest tightness.    Cardiovascular: Negative for chest pain (Denies CP ), palpitations and leg swelling.   Gastrointestinal: Positive for constipation (sometimes ). Negative for abdominal pain, blood in stool, diarrhea, nausea and vomiting.   Endocrine: Negative for cold intolerance and heat intolerance.   Genitourinary: Negative for difficulty urinating, dysuria, frequency, hematuria and urgency.   Musculoskeletal: Positive for arthralgias, back pain and neck pain.   Skin: Positive for wound (small wounds from radha ). Negative for rash.   Allergic/Immunologic: Negative for environmental allergies and food allergies.   Neurological: Negative for dizziness, syncope, weakness, light-headedness, numbness and headaches.   Hematological: Bruises/bleeds easily (bruises).   Psychiatric/Behavioral: Negative for sleep disturbance.       Objective   /83   Pulse 96   Ht 154.9 cm (61\")   Wt 87.5 kg (192 lb 12.8 oz)   SpO2 96%   BMI 36.43 kg/m²   Vitals:    08/01/19 1329   BP: 137/83   Pulse: 96   SpO2: 96%   Weight: " "87.5 kg (192 lb 12.8 oz)   Height: 154.9 cm (61\")      Lab Results (most recent)     None        Physical Exam   Constitutional: She is oriented to person, place, and time. Vital signs are normal. She appears well-developed and well-nourished. She is active and cooperative.   HENT:   Head: Normocephalic.   Mouth/Throat: She has dentures (upper and lower ).   Eyes: Lids are normal.   Wears glasses    Neck: Normal carotid pulses, no hepatojugular reflux and no JVD present. Carotid bruit is not present.   Cardiovascular: Normal rate, regular rhythm and normal heart sounds.   Pulses:       Radial pulses are 2+ on the right side, and 2+ on the left side.        Dorsalis pedis pulses are 2+ on the right side, and 2+ on the left side.        Posterior tibial pulses are 2+ on the right side, and 2+ on the left side.   Trace edema RLE; no edema LLE.   Pulmonary/Chest: Effort normal and breath sounds normal.   Abdominal: Normal appearance and bowel sounds are normal.   Neurological: She is alert and oriented to person, place, and time.   Skin: Skin is warm, dry and intact. Bruising noted.   Psychiatric: She has a normal mood and affect. Her speech is normal and behavior is normal. Judgment and thought content normal. Cognition and memory are normal.       Procedure   Procedures         Assessment/Plan      Diagnosis Plan   1. CAD in native artery     2. Hypercholesterolemia     3. Peripheral vascular disease (CMS/HCC)     4. Orthostatic hypotension     5. Essential hypertension     6. Dyslipidemia     7. Shortness of breath         Return in about 6 months (around 2/1/2020).    CAD-patient's on aspirin, Plavix, beta and statin.  Hyperlipidemia-patient is on Crestor and monitor by PCP.  Orthostatic hypotension-stable on Florinef.  Peripheral vascular disease-patient's on aspirin and statin.  Hypertension-patient is doing well on hydrochlorothiazide, losartan and beta-blocker.  Shortness of breath-stable.  She will continue her " medication regimen.  She will follow-up in 6 months or sooner if any changes.         Patient's Body mass index is 36.43 kg/m². BMI is above normal parameters. Recommendations include: educational material.      Electronically signed by:

## 2020-02-04 ENCOUNTER — OFFICE VISIT (OUTPATIENT)
Dept: CARDIOLOGY | Facility: CLINIC | Age: 65
End: 2020-02-04

## 2020-02-04 ENCOUNTER — TELEPHONE (OUTPATIENT)
Dept: CARDIOLOGY | Facility: CLINIC | Age: 65
End: 2020-02-04

## 2020-02-04 VITALS
HEIGHT: 61 IN | OXYGEN SATURATION: 94 % | WEIGHT: 194.2 LBS | HEART RATE: 104 BPM | BODY MASS INDEX: 36.67 KG/M2 | SYSTOLIC BLOOD PRESSURE: 156 MMHG | DIASTOLIC BLOOD PRESSURE: 97 MMHG

## 2020-02-04 DIAGNOSIS — I95.1 ORTHOSTATIC HYPOTENSION: ICD-10-CM

## 2020-02-04 DIAGNOSIS — R06.02 BREATH SHORTNESS: ICD-10-CM

## 2020-02-04 DIAGNOSIS — R09.89 BILATERAL CAROTID BRUITS: ICD-10-CM

## 2020-02-04 DIAGNOSIS — I65.23 BILATERAL CAROTID ARTERY STENOSIS: ICD-10-CM

## 2020-02-04 DIAGNOSIS — E78.5 DYSLIPIDEMIA: ICD-10-CM

## 2020-02-04 DIAGNOSIS — I73.9 PERIPHERAL VASCULAR DISEASE (HCC): ICD-10-CM

## 2020-02-04 DIAGNOSIS — I65.02 STENOSIS OF LEFT VERTEBRAL ARTERY: ICD-10-CM

## 2020-02-04 DIAGNOSIS — R60.9 PERIPHERAL EDEMA: ICD-10-CM

## 2020-02-04 DIAGNOSIS — Z00.00 HEALTHCARE MAINTENANCE: ICD-10-CM

## 2020-02-04 DIAGNOSIS — I95.1 HYPOTENSION, POSTURAL: ICD-10-CM

## 2020-02-04 DIAGNOSIS — I10 ESSENTIAL HYPERTENSION: ICD-10-CM

## 2020-02-04 DIAGNOSIS — E66.01 MORBIDLY OBESE (HCC): ICD-10-CM

## 2020-02-04 DIAGNOSIS — J43.9 PULMONARY EMPHYSEMA, UNSPECIFIED EMPHYSEMA TYPE (HCC): ICD-10-CM

## 2020-02-04 DIAGNOSIS — I25.10 CAD IN NATIVE ARTERY: Primary | ICD-10-CM

## 2020-02-04 PROCEDURE — 99214 OFFICE O/P EST MOD 30 MIN: CPT | Performed by: NURSE PRACTITIONER

## 2020-02-04 PROCEDURE — 93000 ELECTROCARDIOGRAM COMPLETE: CPT | Performed by: NURSE PRACTITIONER

## 2020-02-04 RX ORDER — POTASSIUM CHLORIDE 750 MG/1
20 CAPSULE, EXTENDED RELEASE ORAL DAILY
Qty: 180 CAPSULE | Refills: 3 | Status: SHIPPED | OUTPATIENT
Start: 2020-02-04 | End: 2020-06-25

## 2020-02-04 RX ORDER — FUROSEMIDE 20 MG/1
20 TABLET ORAL DAILY
Qty: 90 TABLET | Refills: 3 | Status: SHIPPED | OUTPATIENT
Start: 2020-02-04 | End: 2021-03-01

## 2020-02-04 RX ORDER — HYDROCHLOROTHIAZIDE 12.5 MG/1
12.5 TABLET ORAL DAILY
Qty: 90 TABLET | Refills: 3 | Status: SHIPPED | OUTPATIENT
Start: 2020-02-04 | End: 2021-03-01

## 2020-02-04 RX ORDER — METOPROLOL TARTRATE 50 MG/1
50 TABLET, FILM COATED ORAL EVERY 12 HOURS SCHEDULED
Qty: 180 TABLET | Refills: 3 | Status: SHIPPED | OUTPATIENT
Start: 2020-02-04 | End: 2020-02-18 | Stop reason: SDUPTHER

## 2020-02-04 RX ORDER — ATORVASTATIN CALCIUM 80 MG/1
80 TABLET, FILM COATED ORAL DAILY
Qty: 90 TABLET | Refills: 3 | Status: SHIPPED | OUTPATIENT
Start: 2020-02-04 | End: 2021-03-01

## 2020-02-04 RX ORDER — ISOSORBIDE MONONITRATE 30 MG/1
30 TABLET, EXTENDED RELEASE ORAL DAILY
Qty: 90 TABLET | Refills: 3 | Status: SHIPPED | OUTPATIENT
Start: 2020-02-04 | End: 2021-03-01

## 2020-02-04 RX ORDER — LOSARTAN POTASSIUM 100 MG/1
100 TABLET ORAL DAILY
Qty: 90 TABLET | Refills: 3 | Status: SHIPPED | OUTPATIENT
Start: 2020-02-04 | End: 2021-03-01

## 2020-02-04 RX ORDER — FLUDROCORTISONE ACETATE 0.1 MG/1
0.1 TABLET ORAL 3 TIMES WEEKLY
Qty: 90 TABLET | Refills: 3 | Status: SHIPPED | OUTPATIENT
Start: 2020-02-05 | End: 2020-02-04

## 2020-02-04 NOTE — PATIENT INSTRUCTIONS
"Fat and Cholesterol Restricted Eating Plan  Getting too much fat and cholesterol in your diet may cause health problems. Choosing the right foods helps keep your fat and cholesterol at normal levels. This can keep you from getting certain diseases.  Your doctor may recommend an eating plan that includes:  · Total fat: ______% or less of total calories a day.  · Saturated fat: ______% or less of total calories a day.  · Cholesterol: less than _________mg a day.  · Fiber: ______g a day.  What are tips for following this plan?  Meal planning  · At meals, divide your plate into four equal parts:  ? Fill one-half of your plate with vegetables and green salads.  ? Fill one-fourth of your plate with whole grains.  ? Fill one-fourth of your plate with low-fat (lean) protein foods.  · Eat fish that is high in omega-3 fats at least two times a week. This includes mackerel, tuna, sardines, and salmon.  · Eat foods that are high in fiber, such as whole grains, beans, apples, broccoli, carrots, peas, and barley.  General tips    · Work with your doctor to lose weight if you need to.  · Avoid:  ? Foods with added sugar.  ? Fried foods.  ? Foods with partially hydrogenated oils.  · Limit alcohol intake to no more than 1 drink a day for nonpregnant women and 2 drinks a day for men. One drink equals 12 oz of beer, 5 oz of wine, or 1½ oz of hard liquor.  Reading food labels  · Check food labels for:  ? Trans fats.  ? Partially hydrogenated oils.  ? Saturated fat (g) in each serving.  ? Cholesterol (mg) in each serving.  ? Fiber (g) in each serving.  · Choose foods with healthy fats, such as:  ? Monounsaturated fats.  ? Polyunsaturated fats.  ? Omega-3 fats.  · Choose grain products that have whole grains. Look for the word \"whole\" as the first word in the ingredient list.  Cooking  · Cook foods using low-fat methods. These include baking, boiling, grilling, and broiling.  · Eat more home-cooked foods. Eat at restaurants and buffets " less often.  · Avoid cooking using saturated fats, such as butter, cream, palm oil, palm kernel oil, and coconut oil.  Recommended foods    Fruits  · All fresh, canned (in natural juice), or frozen fruits.  Vegetables  · Fresh or frozen vegetables (raw, steamed, roasted, or grilled). Green salads.  Grains  · Whole grains, such as whole wheat or whole grain breads, crackers, cereals, and pasta. Unsweetened oatmeal, bulgur, barley, quinoa, or brown rice. Corn or whole wheat flour tortillas.  Meats and other protein foods  · Ground beef (85% or leaner), grass-fed beef, or beef trimmed of fat. Skinless chicken or turkey. Ground chicken or turkey. Pork trimmed of fat. All fish and seafood. Egg whites. Dried beans, peas, or lentils. Unsalted nuts or seeds. Unsalted canned beans. Nut butters without added sugar or oil.  Dairy  · Low-fat or nonfat dairy products, such as skim or 1% milk, 2% or reduced-fat cheeses, low-fat and fat-free ricotta or cottage cheese, or plain low-fat and nonfat yogurt.  Fats and oils  · Tub margarine without trans fats. Light or reduced-fat mayonnaise and salad dressings. Avocado. Olive, canola, sesame, or safflower oils.  The items listed above may not be a complete list of foods and beverages you can eat. Contact a dietitian for more information.  Foods to avoid  Fruits  · Canned fruit in heavy syrup. Fruit in cream or butter sauce. Fried fruit.  Vegetables  · Vegetables cooked in cheese, cream, or butter sauce. Fried vegetables.  Grains  · White bread. White pasta. White rice. Cornbread. Bagels, pastries, and croissants. Crackers and snack foods that contain trans fat and hydrogenated oils.  Meats and other protein foods  · Fatty cuts of meat. Ribs, chicken wings, saenz, sausage, bologna, salami, chitterlings, fatback, hot dogs, bratwurst, and packaged lunch meats. Liver and organ meats. Whole eggs and egg yolks. Chicken and turkey with skin. Fried meat.  Dairy  · Whole or 2% milk, cream,  half-and-half, and cream cheese. Whole milk cheeses. Whole-fat or sweetened yogurt. Full-fat cheeses. Nondairy creamers and whipped toppings. Processed cheese, cheese spreads, and cheese curds.  Beverages  · Alcohol. Sugar-sweetened drinks such as sodas, lemonade, and fruit drinks.  Fats and oils  · Butter, stick margarine, lard, shortening, ghee, or saenz fat. Coconut, palm kernel, and palm oils.  Sweets and desserts  · Corn syrup, sugars, honey, and molasses. Candy. Jam and jelly. Syrup. Sweetened cereals. Cookies, pies, cakes, donuts, muffins, and ice cream.  The items listed above may not be a complete list of foods and beverages you should avoid. Contact a dietitian for more information.  Summary  · Choosing the right foods helps keep your fat and cholesterol at normal levels. This can keep you from getting certain diseases.  · At meals, fill one-half of your plate with vegetables and green salads.  · Eat high-fiber foods, like whole grains, beans, apples, carrots, peas, and barley.  · Limit added sugar, saturated fats, alcohol, and fried foods.  This information is not intended to replace advice given to you by your health care provider. Make sure you discuss any questions you have with your health care provider.  Document Released: 06/18/2013 Document Revised: 08/21/2019 Document Reviewed: 09/04/2018  [employer] Interactive Patient Education © 2019 [employer]  BMI for Adults    Body mass index (BMI) is a number that is calculated from a person's weight and height. BMI may help to estimate how much of a person's weight is composed of fat. BMI can help identify those who may be at higher risk for certain medical problems.  How is BMI used with adults?  BMI is used as a screening tool to identify possible weight problems. It is used to check whether a person is obese, overweight, healthy weight, or underweight.  How is BMI calculated?  BMI measures your weight and compares it to your height. This can be done  "either in English ([place] or metric measurements. Note that charts are available to help you find your BMI quickly and easily without having to do these calculations yourself.  To calculate your BMI in English ([place] measurements, your health care provider will:  1. Measure your weight in pounds (lb).  2. Multiply the number of pounds by 703.  ? For example, for a person who weighs 180 lb, multiply that number by 703, which equals 126,540.  3. Measure your height in inches (in). Then multiply that number by itself to get a measurement called \"inches squared.\"  ? For example, for a person who is 70 in tall, the \"inches squared\" measurement is 70 in x 70 in, which equals 4900 inches squared.  4. Divide the total from Step 2 (number of lb x 703) by the total from Step 3 (inches squared): 126,540 ÷ 4900 = 25.8. This is your BMI.  To calculate your BMI in metric measurements, your health care provider will:  1. Measure your weight in kilograms (kg).  2. Measure your height in meters (m). Then multiply that number by itself to get a measurement called \"meters squared.\"  ? For example, for a person who is 1.75 m tall, the \"meters squared\" measurement is 1.75 m x 1.75 m, which is equal to 3.1 meters squared.  3. Divide the number of kilograms (your weight) by the meters squared number. In this example: 70 ÷ 3.1 = 22.6. This is your BMI.  How is BMI interpreted?  To interpret your results, your health care provider will use BMI charts to identify whether you are underweight, normal weight, overweight, or obese. The following guidelines will be used:  · Underweight: BMI less than 18.5.  · Normal weight: BMI between 18.5 and 24.9.  · Overweight: BMI between 25 and 29.9.  · Obese: BMI of 30 and above.  Please note:  · Weight includes both fat and muscle, so someone with a muscular build, such as an athlete, may have a BMI that is higher than 24.9. In cases like these, BMI is not an accurate measure of body fat.  · To determine " "if excess body fat is the cause of a BMI of 25 or higher, further assessments may need to be done by a health care provider.  · BMI is usually interpreted in the same way for men and women.  Why is BMI a useful tool?  BMI is useful in two ways:  · Identifying a weight problem that may be related to a medical condition, or that may increase the risk for medical problems.  · Promoting lifestyle and diet changes in order to reach a healthy weight.  Summary  · Body mass index (BMI) is a number that is calculated from a person's weight and height.  · BMI may help to estimate how much of a person's weight is composed of fat. BMI can help identify those who may be at higher risk for certain medical problems.  · BMI can be measured using English measurements or metric measurements.  · To interpret your results, your health care provider will use BMI charts to identify whether you are underweight, normal weight, overweight, or obese.  This information is not intended to replace advice given to you by your health care provider. Make sure you discuss any questions you have with your health care provider.  Document Released: 08/29/2005 Document Revised: 10/31/2018 Document Reviewed: 10/31/2018  [employer] Interactive Patient Education © 2019 [employer]    Hypertension, Adult  High blood pressure (hypertension) is when the force of blood pumping through the arteries is too strong. The arteries are the blood vessels that carry blood from the heart throughout the body. Hypertension forces the heart to work harder to pump blood and may cause arteries to become narrow or stiff. Untreated or uncontrolled hypertension can cause a heart attack, heart failure, a stroke, kidney disease, and other problems.  A blood pressure reading consists of a higher number over a lower number. Ideally, your blood pressure should be below 120/80. The first (\"top\") number is called the systolic pressure. It is a measure of the pressure in your arteries " "as your heart beats. The second (\"bottom\") number is called the diastolic pressure. It is a measure of the pressure in your arteries as the heart relaxes.  What are the causes?  The exact cause of this condition is not known. There are some conditions that result in or are related to high blood pressure.  What increases the risk?  Some risk factors for high blood pressure are under your control. The following factors may make you more likely to develop this condition:  · Smoking.  · Having type 2 diabetes mellitus, high cholesterol, or both.  · Not getting enough exercise or physical activity.  · Being overweight.  · Having too much fat, sugar, calories, or salt (sodium) in your diet.  · Drinking too much alcohol.  Some risk factors for high blood pressure may be difficult or impossible to change. Some of these factors include:  · Having chronic kidney disease.  · Having a family history of high blood pressure.  · Age. Risk increases with age.  · Race. You may be at higher risk if you are .  · Gender. Men are at higher risk than women before age 45. After age 65, women are at higher risk than men.  · Having obstructive sleep apnea.  · Stress.  What are the signs or symptoms?  High blood pressure may not cause symptoms. Very high blood pressure (hypertensive crisis) may cause:  · Headache.  · Anxiety.  · Shortness of breath.  · Nosebleed.  · Nausea and vomiting.  · Vision changes.  · Severe chest pain.  · Seizures.  How is this diagnosed?  This condition is diagnosed by measuring your blood pressure while you are seated, with your arm resting on a flat surface, your legs uncrossed, and your feet flat on the floor. The cuff of the blood pressure monitor will be placed directly against the skin of your upper arm at the level of your heart. It should be measured at least twice using the same arm. Certain conditions can cause a difference in blood pressure between your right and left arms.  Certain " factors can cause blood pressure readings to be lower or higher than normal for a short period of time:  · When your blood pressure is higher when you are in a health care provider's office than when you are at home, this is called white coat hypertension. Most people with this condition do not need medicines.  · When your blood pressure is higher at home than when you are in a health care provider's office, this is called masked hypertension. Most people with this condition may need medicines to control blood pressure.  If you have a high blood pressure reading during one visit or you have normal blood pressure with other risk factors, you may be asked to:  · Return on a different day to have your blood pressure checked again.  · Monitor your blood pressure at home for 1 week or longer.  If you are diagnosed with hypertension, you may have other blood or imaging tests to help your health care provider understand your overall risk for other conditions.  How is this treated?  This condition is treated by making healthy lifestyle changes, such as eating healthy foods, exercising more, and reducing your alcohol intake. Your health care provider may prescribe medicine if lifestyle changes are not enough to get your blood pressure under control, and if:  · Your systolic blood pressure is above 130.  · Your diastolic blood pressure is above 80.  Your personal target blood pressure may vary depending on your medical conditions, your age, and other factors.  Follow these instructions at home:  Eating and drinking    · Eat a diet that is high in fiber and potassium, and low in sodium, added sugar, and fat. An example eating plan is called the DASH (Dietary Approaches to Stop Hypertension) diet. To eat this way:  ? Eat plenty of fresh fruits and vegetables. Try to fill one half of your plate at each meal with fruits and vegetables.  ? Eat whole grains, such as whole-wheat pasta, brown rice, or whole-grain bread. Fill about  one fourth of your plate with whole grains.  ? Eat or drink low-fat dairy products, such as skim milk or low-fat yogurt.  ? Avoid fatty cuts of meat, processed or cured meats, and poultry with skin. Fill about one fourth of your plate with lean proteins, such as fish, chicken without skin, beans, eggs, or tofu.  ? Avoid pre-made and processed foods. These tend to be higher in sodium, added sugar, and fat.  · Reduce your daily sodium intake. Most people with hypertension should eat less than 1,500 mg of sodium a day.  · Do not drink alcohol if:  ? Your health care provider tells you not to drink.  ? You are pregnant, may be pregnant, or are planning to become pregnant.  · If you drink alcohol:  ? Limit how much you use to:  § 0-1 drink a day for women.  § 0-2 drinks a day for men.  ? Be aware of how much alcohol is in your drink. In the [place] one drink equals one 12 oz bottle of beer (355 mL), one 5 oz glass of wine (148 mL), or one 1½ oz glass of hard liquor (44 mL).  Lifestyle    · Work with your health care provider to maintain a healthy body weight or to lose weight. Ask what an ideal weight is for you.  · Get at least 30 minutes of exercise most days of the week. Activities may include walking, swimming, or biking.  · Include exercise to strengthen your muscles (resistance exercise), such as Pilates or lifting weights, as part of your weekly exercise routine. Try to do these types of exercises for 30 minutes at least 3 days a week.  · Do not use any products that contain nicotine or tobacco, such as cigarettes, e-cigarettes, and chewing tobacco. If you need help quitting, ask your health care provider.  · Monitor your blood pressure at home as told by your health care provider.  · Keep all follow-up visits as told by your health care provider. This is important.  Medicines  · Take over-the-counter and prescription medicines only as told by your health care provider. Follow directions carefully. Blood pressure  medicines must be taken as prescribed.  · Do not skip doses of blood pressure medicine. Doing this puts you at risk for problems and can make the medicine less effective.  · Ask your health care provider about side effects or reactions to medicines that you should watch for.  Contact a health care provider if you:  · Think you are having a reaction to a medicine you are taking.  · Have headaches that keep coming back (recurring).  · Feel dizzy.  · Have swelling in your ankles.  · Have trouble with your vision.  Get help right away if you:  · Develop a severe headache or confusion.  · Have unusual weakness or numbness.  · Feel faint.  · Have severe pain in your chest or abdomen.  · Vomit repeatedly.  · Have trouble breathing.  Summary  · Hypertension is when the force of blood pumping through your arteries is too strong. If this condition is not controlled, it may put you at risk for serious complications.  · Your personal target blood pressure may vary depending on your medical conditions, your age, and other factors. For most people, a normal blood pressure is less than 120/80.  · Hypertension is treated with lifestyle changes, medicines, or a combination of both. Lifestyle changes include losing weight, eating a healthy, low-sodium diet, exercising more, and limiting alcohol.  This information is not intended to replace advice given to you by your health care provider. Make sure you discuss any questions you have with your health care provider.  Document Released: 12/18/2006 Document Revised: 08/28/2019 Document Reviewed: 08/28/2019  [employer] Interactive Patient Education © 2019 [employer]

## 2020-02-04 NOTE — TELEPHONE ENCOUNTER
Called patient's daughter, Verito, who is on her HIPPA form.  I advised patient's daughter that only person in the room was patient and her granddaughter, who is 4 yrs old and that the patient's other daughter was not with her. I advised that patient's EKG today was NSR.  I also advised her that her mother had no complaints of chest pain or any chest discomfort at all nor did she indicate she had been taking nitro for any reason.  I advised her that her mother was not in cardiac arrest as we would have contacted EMS to transport patient to ER if she had been.  I let her know that the only thing was patient's blood pressure was slightly elevated so I adjusted her BP medications.  Patient did state she was having some swelling, but that she had just started taking her PRN water pill.  Patient will be coming back in for a follow-up in 2 weeks to reassess her BP.  She appreciated the call back and was really worried and wanted to make sure she was not missing anything.  She advised she will come with her daughter to her follow-up nurse visit.

## 2020-02-04 NOTE — PROGRESS NOTES
Subjective   Barby Sue is a 64 y.o. female     Chief Complaint   Patient presents with   • Follow-up       HPI    Problem List:    1. Coronary artery disease  1.1 left heart cath 3/20/1 12-20% right coronary artery, circumflex 50-70% proximal, 20-30% first diagonal, EF 60%  1.2 Follow up stress August 2015 demonstrated chest wall attenuation but no sign of ischemia, preserved ER  1.3 Stress Test 12/14/16 - no ischemia; low risk  1.4 stress test 3/20/19-possible inferior basilar ischemia, preserved post stress EF of 83%, increased transient ischemic dilation ratio of 0.43  1.5 LHC 4/22/19 -stent proximal circumflex, EF 55 to 60%, LVEDP 16-18, no aortic valve gradient present on catheter pullback  2. PVD with history of aortobifemoral bypass 2014  2.1 BLE Arterial U/S 12/14/16 - < or equal to 50% right common femoral artery; high-grade stenosis left posterior tibial artery 50-75% stenosis  2.2 CTA Abd with runoff 5/16/17 - patent aorta bi-iliac graft, mild narrowing seen at prox aspect of the celiac trunk.  No high grade stenosis. Fatty infiltration of the liver.   3. Chronic hypotension   4. Hyperlipidemia   5. Carotid Artery Disease   5.1 Carotid Artery U/S 12/14/16 - 50-75% right external carotid; 16-49% KENNEDY and LICA; antegrade flow both vertebral arteries   5.2 Carotid Artery US 2/13/19 - LICA 50-69%; less than 50% on the right   5.3 CTA head 2/15/19 - left external carotid artery is occluded, no sig stenosis KENNEDY; left vertebral artery at arch is near occluded; enlarged precarinal node 1.4 x 1.5 and right of the midline measuring 1.1 x 1.6, emphysema, per patient Neuroradiologist stated not blocked as indicated in CT  6. Echo 12/14/16 - mild LVH; EF > 65%; DD I; trace MR; physiological TR; mild NJ  6.1 echo 3/20/19-mild LVH, EF 61-65%, diastolic dysfunction 1  7. COPD/Emphysema   8. Palpitations  8.1 Event Monitor 3/31-4/11/17 - NSR - ST    Patient is a 64-year-old female who presents today for follow-up with  her granddaughter at her side.  She denies any chest pain, pressure, palpitations, fluttering, dizziness, presyncope, syncope, orthopnea or PND.  She says she does get swelling in her feet which typically goes down overnight.  She states she has had a little more the last few days so she just started taking her Lasix.  Patient says she does get short of breath easily with activity but she has a history of emphysema.  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  Patient is no longer taking her statin as she says she advised her pharmacy that it was causing her problems and they stated that they would call us to get a different medication however we have never received a call so she has not been on a statin.  We are going to change her to a different statin today.  Patient has not missed any doses of her aspirin and/or Plavix per her report.    Current Outpatient Medications on File Prior to Visit   Medication Sig Dispense Refill   • aspirin 81 MG EC tablet Take 1 tablet by mouth daily.     • Biotin 1000 MCG tablet Take 1 tablet by mouth daily.     • BLACK COHOSH PO Take 1,000 mg by mouth daily.     • buPROPion XL (WELLBUTRIN XL) 150 MG 24 hr tablet Take 1 tablet by mouth Daily. 90 tablet 3   • busPIRone (BUSPAR) 7.5 MG tablet Take 7.5 mg by mouth 2 (Two) Times a Day.  0   • CloNIDine (CATAPRES) 0.1 MG tablet Take 1 tablet by mouth Every 12 (Twelve) Hours. 180 tablet 3   • clopidogrel (PLAVIX) 75 MG tablet Take 1 tablet by mouth Daily. 90 tablet 3   • cyclobenzaprine (FLEXERIL) 10 MG tablet Take 1 tablet by mouth 3 (three) times a day.     • docusate sodium (COLACE) 100 MG capsule Take 100 mg by mouth As Needed.  5   • escitalopram (LEXAPRO) 10 MG tablet Take 10 mg by mouth Daily.  0   • fluticasone (FLONASE) 50 MCG/ACT nasal spray   3   • gabapentin (NEURONTIN) 400 MG capsule Take 400 mg by mouth 3 (Three) Times a Day.     • midodrine (PROAMATINE) 2.5 MG  tablet Take 1 tablet by mouth 3 (Three) Times a Day. 270 tablet 3   • nitroglycerin (NITROSTAT) 0.4 MG SL tablet Place 1 tablet under the tongue Every 5 (Five) Minutes As Needed for Chest Pain. Only take up to 3 tablets. Call 911 if pain persists. 25 tablet 5   • oxyCODONE-acetaminophen (PERCOCET)  MG per tablet Take 1 tablet by mouth every 8 (eight) hours as needed for moderate pain (4-6).     • pantoprazole (PROTONIX) 40 MG EC tablet Take 1 tablet by mouth Daily. 90 tablet 3   • Umeclidinium Bromide 62.5 MCG/INH aerosol powder  Inhale 1 puff Daily. 90 each 3   • vitamin D (ERGOCALCIFEROL) 50777 UNITS capsule capsule 1 (One) Time Per Week.     • [DISCONTINUED] fludrocortisone 0.1 MG tablet Take 1 tablet by mouth 3 (Three) Times a Week. Take on Monday, Wednesday, and Friday. 90 tablet 3   • [DISCONTINUED] furosemide (LASIX) 20 MG tablet Take 1 tablet by mouth Daily. 90 tablet 3   • [DISCONTINUED] hydrochlorothiazide (HYDRODIURIL) 12.5 MG tablet Take 1 tablet by mouth Daily. 90 tablet 3   • [DISCONTINUED] isosorbide mononitrate (IMDUR) 30 MG 24 hr tablet Take 1 tablet by mouth Daily. 90 tablet 3   • [DISCONTINUED] losartan (COZAAR) 100 MG tablet Take 1 tablet by mouth Daily. 90 tablet 3   • [DISCONTINUED] metoprolol tartrate (LOPRESSOR) 25 MG tablet Take 1 tablet by mouth Every 12 (Twelve) Hours. 180 tablet 3   • [DISCONTINUED] potassium chloride (MICRO-K) 10 MEQ CR capsule Take 1 capsule by mouth Daily. (Patient taking differently: Take 20 mEq by mouth Daily.) 90 capsule 3   • [DISCONTINUED] rosuvastatin (CRESTOR) 40 MG tablet Take 1 tablet by mouth Daily. 90 tablet 3     No current facility-administered medications on file prior to visit.        ALLERGIES    Lisinopril; Adhesive tape; Ibuprofen; and Latex    Past Medical History:   Diagnosis Date   • Anxiety    • Back pain    • Carotid bruit    • Chest pain    • COPD (chronic obstructive pulmonary disease) (CMS/HCC)    • Dyslipidemia    • Fainting    •  Functional murmur    • GERD (gastroesophageal reflux disease)    • Hypercholesterolemia    • Hyperlipidemia    • Hypertension    • Hypokalemia    • Orthostatic hypotension    • Orthostatic hypotension    • Peripheral vascular disease (CMS/HCC)    • Peripheral vascular disease (CMS/HCC)     PVD with history of aortobifemoral bypass   • Shortness of breath    • Sleep apnea        Social History     Socioeconomic History   • Marital status:      Spouse name: Not on file   • Number of children: Not on file   • Years of education: Not on file   • Highest education level: Not on file   Tobacco Use   • Smoking status: Former Smoker     Packs/day: 0.50     Types: Cigarettes   • Smokeless tobacco: Never Used   • Tobacco comment: Pt states she stopped smoking 3/14/19   Substance and Sexual Activity   • Alcohol use: No   • Drug use: No   • Sexual activity: Defer       Family History   Problem Relation Age of Onset   • Heart failure Mother         Congestive   • Hypertension Mother    • Heart failure Sister         Congestive   • Coronary artery disease Sister    • Heart attack Sister         Acute   • Hypertension Sister    • Sudden death Sister    • Coronary artery disease Brother    • Seizures Brother    • Hypertension Brother    • Heart failure Maternal Grandmother         Congestive       Review of Systems   Constitutional: Positive for fatigue (States she babysits all day ). Negative for diaphoresis.   HENT: Negative for congestion, rhinorrhea and sore throat.    Eyes: Positive for visual disturbance (glasses daily).   Respiratory: Positive for shortness of breath (easily, history of emphysema). Negative for chest tightness.    Cardiovascular: Positive for leg swelling (BLE edema in feet, goes down overnight ). Negative for chest pain (Denies CP) and palpitations.   Gastrointestinal: Positive for constipation (occasionally- uses colace PRN). Negative for abdominal pain, blood in stool, diarrhea, nausea and  "vomiting.   Endocrine: Negative for cold intolerance and heat intolerance.   Genitourinary: Negative for difficulty urinating, dysuria, frequency, hematuria and urgency.   Musculoskeletal: Positive for arthralgias, back pain and neck pain.   Skin: Negative for rash and wound.   Allergic/Immunologic: Negative for environmental allergies and food allergies.   Neurological: Negative for dizziness, syncope, weakness, numbness and headaches.   Hematological: Bruises/bleeds easily (bruises).   Psychiatric/Behavioral: Negative for sleep disturbance.       Objective   /97   Pulse 104   Ht 154.9 cm (61\")   Wt 88.1 kg (194 lb 3.2 oz)   SpO2 94%   BMI 36.69 kg/m²   Vitals:    02/04/20 1352   BP: 156/97   Pulse: 104   SpO2: 94%   Weight: 88.1 kg (194 lb 3.2 oz)   Height: 154.9 cm (61\")      Lab Results (most recent)     None        Physical Exam   Constitutional: She is oriented to person, place, and time. Vital signs are normal. She appears well-developed and well-nourished. She is active and cooperative.   HENT:   Head: Normocephalic.   Right Ear: Decreased hearing is noted.   Left Ear: Decreased hearing is noted.   Mouth/Throat: She has dentures (upper and lower ).   Eyes: Lids are normal.   Wears glasses    Neck: Normal carotid pulses, no hepatojugular reflux and no JVD present. Carotid bruit is not present.   Cardiovascular: Regular rhythm and normal heart sounds. Tachycardia present.   Pulses:       Radial pulses are 2+ on the right side, and 2+ on the left side.        Dorsalis pedis pulses are 2+ on the right side, and 2+ on the left side.        Posterior tibial pulses are 2+ on the right side, and 2+ on the left side.   No edema BLE.   Pulmonary/Chest: Effort normal and breath sounds normal.   Abdominal: Normal appearance and bowel sounds are normal.   Neurological: She is alert and oriented to person, place, and time.   Skin: Skin is warm, dry and intact.   Psychiatric: She has a normal mood and affect. " Her speech is normal and behavior is normal. Judgment and thought content normal. Cognition and memory are normal.       Procedure     ECG 12 Lead  Date/Time: 2/4/2020 2:14 PM  Performed by: Felicita Robledo APRN  Authorized by: Felicita Robledo APRN   Comparison: compared with previous ECG from 2/20/2019  Similar to previous ECG  Rhythm: sinus rhythm  Rate: normal  BPM: 94  QRS axis: normal    Clinical impression: normal ECG                 Assessment/Plan      Diagnosis Plan   1. CAD in native artery  atorvastatin (LIPITOR) 80 MG tablet    isosorbide mononitrate (IMDUR) 30 MG 24 hr tablet    metoprolol tartrate (LOPRESSOR) 50 MG tablet    Lipid Panel    ECG 12 Lead   2. Morbidly obese (CMS/HCC)  Magnesium   3. Peripheral vascular disease (CMS/HCC)  atorvastatin (LIPITOR) 80 MG tablet   4. Essential hypertension  losartan (COZAAR) 100 MG tablet    hydroCHLOROthiazide (HYDRODIURIL) 12.5 MG tablet    metoprolol tartrate (LOPRESSOR) 50 MG tablet    Comprehensive Metabolic Panel    TSH    CBC & Differential    ECG 12 Lead   5. Orthostatic hypotension     6. Breath shortness  Ambulatory Referral to Pulmonology   7. Dyslipidemia  atorvastatin (LIPITOR) 80 MG tablet    Lipid Panel   8. Stenosis of left vertebral artery  atorvastatin (LIPITOR) 80 MG tablet   9. Peripheral edema  furosemide (LASIX) 20 MG tablet    potassium chloride (MICRO-K) 10 MEQ CR capsule   10. Hypotension, postural     11. Pulmonary emphysema, unspecified emphysema type (CMS/HCC)  Ambulatory Referral to Pulmonology   12. Healthcare maintenance  Comprehensive Metabolic Panel    Lipid Panel    TSH    Magnesium    CBC & Differential       Return nurse visit 2 weeks will make f/u at that time.    CAD-patient is on beta, aspirin, Plavix and we will start her on Lipitor.  We refilled her isosorbide.  Obesity-provide educational material.  Peripheral vascular disease-on aspirin and starting Lipitor.  Hypertension-blood pressure slightly elevated today and  she takes losartan, hydrochlorothiazide and metoprolol.  I am going to increase her metoprolol which will also improve her heart rate.  Orthostatic hypotension/postural hypotension-stable however I am going to discontinue her Florinef and just leave her on her midodrine.  Shortness of breath-stable.  Dyslipidemia-patient will start Lipitor.  Left vertebral artery stenosis-patient is on aspirin and statin and per patient's report the neuroradiologist indicated that the amount of plaque was nowhere near what the CT scan indicated and no further work-up needed.  Pulmonary emphysema/shortness of breath-I am referring patient to pulmonary for evaluation.  Patient will get CMP, lipid, TSH, magnesium and CBC but she will wait until she has been on her cholesterol medicine for 6 weeks.  She will continue her medication regimen with above-noted change.  She will follow-up in 2 weeks for nurse visit to reassess her blood pressure.  If she is doing well we will make her follow-up at that time.       Barby Sue  reports that she has quit smoking. Her smoking use included cigarettes. She smoked 0.50 packs per day. She has never used smokeless tobacco.       Patient's Body mass index is 36.69 kg/m². BMI is above normal parameters. Recommendations include: educational material.      Electronically signed by:

## 2020-02-18 ENCOUNTER — CLINICAL SUPPORT (OUTPATIENT)
Dept: CARDIOLOGY | Facility: CLINIC | Age: 65
End: 2020-02-18

## 2020-02-18 VITALS
WEIGHT: 193 LBS | BODY MASS INDEX: 36.44 KG/M2 | SYSTOLIC BLOOD PRESSURE: 141 MMHG | HEART RATE: 97 BPM | OXYGEN SATURATION: 94 % | HEIGHT: 61 IN | DIASTOLIC BLOOD PRESSURE: 86 MMHG

## 2020-02-18 DIAGNOSIS — I25.10 CAD IN NATIVE ARTERY: ICD-10-CM

## 2020-02-18 DIAGNOSIS — I10 ESSENTIAL HYPERTENSION: Primary | ICD-10-CM

## 2020-02-18 PROCEDURE — 99211 OFF/OP EST MAY X REQ PHY/QHP: CPT | Performed by: NURSE PRACTITIONER

## 2020-02-18 RX ORDER — METOPROLOL TARTRATE 75 MG/1
75 TABLET, FILM COATED ORAL EVERY 12 HOURS SCHEDULED
Qty: 270 TABLET | Refills: 1 | Status: SHIPPED | OUTPATIENT
Start: 2020-02-18 | End: 2022-02-11 | Stop reason: DRUGHIGH

## 2020-02-18 NOTE — PROGRESS NOTES
Barby Paigeey  1955  2/18/2020   ?   Chief Complaint   Patient presents with   • Nurse visit     Here for a nurse visit to check vitals       ?   HPI:   ? Patient here for a nurse visit to check vitals and swelling. Was seen by OSCAR Rankin on 2/4/20 and BP was elevated at that time and Florinef was discontinued. States she is feeling well, but BP readings at home have still been elevated on systolic readings. She forgot to bring her log with her today. Also noted that her heart rate was in the 120's when she would check her BP.  Denies chest pain at this time, but does have frequent palpitations. One episode since last visit where she had chest pain at night. Did take NTG x 1 dose. Felt like she passed out and felt sick to her stomach when she woke up. Vomited x 1 and felt better after that. They drove to the ER, but didn't go in Was weak for a couple of days after that, but better since then.  Vitals obtained.  SHANKAR DUONG  ?   ?     Current Outpatient Medications:   •  aspirin 81 MG EC tablet, Take 1 tablet by mouth daily., Disp: , Rfl:   •  atorvastatin (LIPITOR) 80 MG tablet, Take 1 tablet by mouth Daily., Disp: 90 tablet, Rfl: 3  •  Biotin 1000 MCG tablet, Take 1 tablet by mouth daily., Disp: , Rfl:   •  BLACK COHOSH PO, Take 1,000 mg by mouth daily., Disp: , Rfl:   •  buPROPion XL (WELLBUTRIN XL) 150 MG 24 hr tablet, Take 1 tablet by mouth Daily., Disp: 90 tablet, Rfl: 3  •  busPIRone (BUSPAR) 7.5 MG tablet, Take 7.5 mg by mouth 2 (Two) Times a Day., Disp: , Rfl: 0  •  CloNIDine (CATAPRES) 0.1 MG tablet, Take 1 tablet by mouth Every 12 (Twelve) Hours., Disp: 180 tablet, Rfl: 3  •  clopidogrel (PLAVIX) 75 MG tablet, Take 1 tablet by mouth Daily., Disp: 90 tablet, Rfl: 3  •  cyclobenzaprine (FLEXERIL) 10 MG tablet, Take 1 tablet by mouth 3 (three) times a day., Disp: , Rfl:   •  docusate sodium (COLACE) 100 MG capsule, Take 100 mg by mouth As Needed., Disp: , Rfl: 5  •  escitalopram (LEXAPRO) 10 MG  tablet, Take 10 mg by mouth Daily., Disp: , Rfl: 0  •  fluticasone (FLONASE) 50 MCG/ACT nasal spray, , Disp: , Rfl: 3  •  furosemide (LASIX) 20 MG tablet, Take 1 tablet by mouth Daily., Disp: 90 tablet, Rfl: 3  •  gabapentin (NEURONTIN) 400 MG capsule, Take 400 mg by mouth 3 (Three) Times a Day., Disp: , Rfl:   •  hydroCHLOROthiazide (HYDRODIURIL) 12.5 MG tablet, Take 1 tablet by mouth Daily., Disp: 90 tablet, Rfl: 3  •  isosorbide mononitrate (IMDUR) 30 MG 24 hr tablet, Take 1 tablet by mouth Daily., Disp: 90 tablet, Rfl: 3  •  losartan (COZAAR) 100 MG tablet, Take 1 tablet by mouth Daily., Disp: 90 tablet, Rfl: 3  •  metoprolol tartrate (LOPRESSOR) 50 MG tablet, Take 1 tablet by mouth Every 12 (Twelve) Hours., Disp: 180 tablet, Rfl: 3  •  midodrine (PROAMATINE) 2.5 MG tablet, Take 1 tablet by mouth 3 (Three) Times a Day., Disp: 270 tablet, Rfl: 3  •  nitroglycerin (NITROSTAT) 0.4 MG SL tablet, Place 1 tablet under the tongue Every 5 (Five) Minutes As Needed for Chest Pain. Only take up to 3 tablets. Call 911 if pain persists., Disp: 25 tablet, Rfl: 5  •  oxyCODONE-acetaminophen (PERCOCET)  MG per tablet, Take 1 tablet by mouth every 8 (eight) hours as needed for moderate pain (4-6)., Disp: , Rfl:   •  pantoprazole (PROTONIX) 40 MG EC tablet, Take 1 tablet by mouth Daily., Disp: 90 tablet, Rfl: 3  •  potassium chloride (MICRO-K) 10 MEQ CR capsule, Take 2 capsules by mouth Daily., Disp: 180 capsule, Rfl: 3  •  Umeclidinium Bromide 62.5 MCG/INH aerosol powder , Inhale 1 puff Daily., Disp: 90 each, Rfl: 3  •  vitamin D (ERGOCALCIFEROL) 69034 UNITS capsule capsule, 1 (One) Time Per Week., Disp: , Rfl:    ?   ?   Lisinopril; Adhesive tape; Ibuprofen; and Latex       Procedures     ?   Assessment/Plan    ?    1. Hypertension   2. Palpitations  ?   ?     Vitals and symptoms reviewed with Felicita. Per verbal order, patient is to increase Metoprolol tartrate to 75mg bid and to continue monitoring her BP at home. She  will call or come in if any problems. Patient was given written instructions on medication change and will cut her current 50mg tablets in half and take 1 1/2 tablets bid.  SHANKAR DUONG

## 2020-02-28 DIAGNOSIS — I95.1 HYPOTENSION, POSTURAL: ICD-10-CM

## 2020-02-28 DIAGNOSIS — R60.9 PERIPHERAL EDEMA: ICD-10-CM

## 2020-02-28 RX ORDER — POTASSIUM CHLORIDE 20 MEQ/1
TABLET, EXTENDED RELEASE ORAL
Qty: 90 TABLET | Refills: 3 | Status: SHIPPED | OUTPATIENT
Start: 2020-02-28 | End: 2020-04-20

## 2020-02-28 RX ORDER — MIDODRINE HYDROCHLORIDE 2.5 MG/1
2.5 TABLET ORAL 3 TIMES DAILY
Qty: 270 TABLET | Refills: 3 | Status: SHIPPED | OUTPATIENT
Start: 2020-02-28 | End: 2021-03-01

## 2020-04-18 DIAGNOSIS — R60.9 PERIPHERAL EDEMA: ICD-10-CM

## 2020-04-20 RX ORDER — POTASSIUM CHLORIDE 20 MEQ/1
TABLET, EXTENDED RELEASE ORAL
Qty: 90 TABLET | Refills: 3 | Status: SHIPPED | OUTPATIENT
Start: 2020-04-20 | End: 2022-12-13 | Stop reason: SDUPTHER

## 2020-06-25 ENCOUNTER — OFFICE VISIT (OUTPATIENT)
Dept: CARDIOLOGY | Facility: CLINIC | Age: 65
End: 2020-06-25

## 2020-06-25 VITALS
DIASTOLIC BLOOD PRESSURE: 80 MMHG | OXYGEN SATURATION: 98 % | HEART RATE: 84 BPM | HEIGHT: 61 IN | TEMPERATURE: 97.4 F | WEIGHT: 189 LBS | BODY MASS INDEX: 35.68 KG/M2 | SYSTOLIC BLOOD PRESSURE: 135 MMHG

## 2020-06-25 DIAGNOSIS — I65.02 STENOSIS OF LEFT VERTEBRAL ARTERY: ICD-10-CM

## 2020-06-25 DIAGNOSIS — R00.2 PALPITATIONS: ICD-10-CM

## 2020-06-25 DIAGNOSIS — K21.9 GASTROESOPHAGEAL REFLUX DISEASE, ESOPHAGITIS PRESENCE NOT SPECIFIED: ICD-10-CM

## 2020-06-25 DIAGNOSIS — I25.10 CORONARY ARTERY DISEASE INVOLVING NATIVE CORONARY ARTERY OF NATIVE HEART WITHOUT ANGINA PECTORIS: Primary | ICD-10-CM

## 2020-06-25 DIAGNOSIS — I10 ESSENTIAL HYPERTENSION: ICD-10-CM

## 2020-06-25 DIAGNOSIS — I73.9 PERIPHERAL VASCULAR DISEASE (HCC): ICD-10-CM

## 2020-06-25 DIAGNOSIS — I95.1 ORTHOSTATIC HYPOTENSION: ICD-10-CM

## 2020-06-25 DIAGNOSIS — E78.00 HYPERCHOLESTEROLEMIA: ICD-10-CM

## 2020-06-25 DIAGNOSIS — R06.02 SHORTNESS OF BREATH: ICD-10-CM

## 2020-06-25 PROCEDURE — 99214 OFFICE O/P EST MOD 30 MIN: CPT | Performed by: NURSE PRACTITIONER

## 2020-06-25 RX ORDER — PANTOPRAZOLE SODIUM 40 MG/1
40 TABLET, DELAYED RELEASE ORAL DAILY
Qty: 90 TABLET | Refills: 3 | Status: SHIPPED | OUTPATIENT
Start: 2020-06-25 | End: 2021-07-28

## 2020-06-25 NOTE — PROGRESS NOTES
Subjective   Barby Sue is a 65 y.o. female     Chief Complaint   Patient presents with   • Coronary Artery Disease       HPI    Problem List:    1. Coronary artery disease  1.1 left heart cath 3/20/1 12-20% right coronary artery, circumflex 50-70% proximal, 20-30% first diagonal, EF 60%  1.2 Follow up stress August 2015 demonstrated chest wall attenuation but no sign of ischemia, preserved ER  1.3 Stress Test 12/14/16 - no ischemia; low risk  1.4 stress test 3/20/19-possible inferior basilar ischemia, preserved post stress EF of 83%, increased transient ischemic dilation ratio of 0.43  1.5 LHC 4/22/19 -stent proximal circumflex, EF 55 to 60%, LVEDP 16-18, no aortic valve gradient present on catheter pullback  2. PVD with history of aortobifemoral bypass 2014  2.1 BLE Arterial U/S 12/14/16 - < or equal to 50% right common femoral artery; high-grade stenosis left posterior tibial artery 50-75% stenosis  2.2 CTA Abd with runoff 5/16/17 - patent aorta bi-iliac graft, mild narrowing seen at prox aspect of the celiac trunk.  No high grade stenosis. Fatty infiltration of the liver.   3. Chronic hypotension   4. Hyperlipidemia   5. Carotid Artery Disease   5.1 Carotid Artery U/S 12/14/16 - 50-75% right external carotid; 16-49% KENNEDY and LICA; antegrade flow both vertebral arteries   5.2 Carotid Artery US 2/13/19 - LICA 50-69%; less than 50% on the right   5.3 CTA head 2/15/19 - left external carotid artery is occluded, no sig stenosis KENNEDY; left vertebral artery at arch is near occluded; enlarged precarinal node 1.4 x 1.5 and right of the midline measuring 1.1 x 1.6, emphysema, per patient Neuroradiologist stated not blocked as indicated in CT  6. Echo 12/14/16 - mild LVH; EF > 65%; DD I; trace MR; physiological TR; mild MN  6.1 echo 3/20/19-mild LVH, EF 61-65%, diastolic dysfunction 1  7. COPD/Emphysema, Dr. Garcia using O2 2L NC @ HS    8. Palpitations  8.1 Event Monitor 3/31-4/11/17 - NSR - ST    Patient is a  65-year-old female who presents today for follow-up.  She denies any chest pain, pressure, palpitations, fluttering, dizziness, presyncope, syncope, orthopnea, PND or edema.  She says she has been doing more than she has been able to do before.  She says she actually feels really well.  She does still have shortness of breath with exertion but she has been seeing Dr. Garcia and is awaiting for follow-up.    Patient never got repeat labs so reprinted order so that she can get them done back home.    Current Outpatient Medications on File Prior to Visit   Medication Sig Dispense Refill   • Acetaminophen (TYLENOL ARTHRITIS PAIN PO) Take 1 tablet by mouth Every 8 (Eight) Hours As Needed.     • aspirin 81 MG EC tablet Take 81 mg by mouth Daily.     • atorvastatin (LIPITOR) 80 MG tablet Take 1 tablet by mouth Daily. 90 tablet 3   • Biotin 1000 MCG tablet Take 1 tablet by mouth daily.     • BLACK COHOSH PO Take 1,000 mg by mouth daily.     • busPIRone (BUSPAR) 7.5 MG tablet Take 7.5 mg by mouth 2 (Two) Times a Day.  0   • CloNIDine (CATAPRES) 0.1 MG tablet Take 1 tablet by mouth Every 12 (Twelve) Hours. 180 tablet 3   • clopidogrel (PLAVIX) 75 MG tablet Take 1 tablet by mouth Daily. 90 tablet 3   • cyclobenzaprine (FLEXERIL) 10 MG tablet Take 1 tablet by mouth 3 (three) times a day.     • docusate sodium (COLACE) 100 MG capsule Take 100 mg by mouth As Needed.  5   • escitalopram (LEXAPRO) 10 MG tablet Take 10 mg by mouth Daily.  0   • fluticasone (FLONASE) 50 MCG/ACT nasal spray   3   • furosemide (LASIX) 20 MG tablet Take 1 tablet by mouth Daily. 90 tablet 3   • gabapentin (NEURONTIN) 400 MG capsule Take 400 mg by mouth 3 (Three) Times a Day.     • hydroCHLOROthiazide (HYDRODIURIL) 12.5 MG tablet Take 1 tablet by mouth Daily. 90 tablet 3   • isosorbide mononitrate (IMDUR) 30 MG 24 hr tablet Take 1 tablet by mouth Daily. 90 tablet 3   • losartan (COZAAR) 100 MG tablet Take 1 tablet by mouth Daily. 90 tablet 3   • metoprolol  tartrate 75 MG tablet Take 75 mg by mouth Every 12 (Twelve) Hours. 270 tablet 1   • midodrine (PROAMATINE) 2.5 MG tablet TAKE 1 TABLET BY MOUTH 3 (THREE) TIMES A DAY. 270 tablet 3   • nitroglycerin (NITROSTAT) 0.4 MG SL tablet Place 1 tablet under the tongue Every 5 (Five) Minutes As Needed for Chest Pain. Only take up to 3 tablets. Call 911 if pain persists. 25 tablet 5   • oxyCODONE-acetaminophen (PERCOCET)  MG per tablet Take 1 tablet by mouth every 8 (eight) hours as needed for moderate pain (4-6).     • potassium chloride (K-DUR,KLOR-CON) 20 MEQ CR tablet TAKE 1 CAPSULE BY MOUTH DAILY. 90 tablet 3   • Umeclidinium Bromide 62.5 MCG/INH aerosol powder  Inhale 1 puff Daily. 90 each 3   • vitamin D (ERGOCALCIFEROL) 86679 UNITS capsule capsule 1 (One) Time Per Week.     • [DISCONTINUED] pantoprazole (PROTONIX) 40 MG EC tablet Take 1 tablet by mouth Daily. 90 tablet 3   • [DISCONTINUED] buPROPion XL (WELLBUTRIN XL) 150 MG 24 hr tablet Take 1 tablet by mouth Daily. 90 tablet 3   • [DISCONTINUED] potassium chloride (MICRO-K) 10 MEQ CR capsule Take 2 capsules by mouth Daily. 180 capsule 3     No current facility-administered medications on file prior to visit.        ALLERGIES    Lisinopril; Adhesive tape; Ibuprofen; and Latex    Past Medical History:   Diagnosis Date   • Anxiety    • Back pain    • Carotid bruit    • Chest pain    • COPD (chronic obstructive pulmonary disease) (CMS/HCC)    • Dyslipidemia    • Fainting    • Functional murmur    • GERD (gastroesophageal reflux disease)    • Hypercholesterolemia    • Hyperlipidemia    • Hypertension    • Hypokalemia    • Orthostatic hypotension    • Orthostatic hypotension    • Peripheral vascular disease (CMS/HCC)    • Peripheral vascular disease (CMS/HCC)     PVD with history of aortobifemoral bypass   • Shortness of breath    • Sleep apnea        Social History     Socioeconomic History   • Marital status:      Spouse name: Not on file   • Number of  children: Not on file   • Years of education: Not on file   • Highest education level: Not on file   Tobacco Use   • Smoking status: Current Every Day Smoker     Packs/day: 0.50     Years: 40.00     Pack years: 20.00     Types: Cigarettes   • Smokeless tobacco: Never Used   • Tobacco comment: Pt states she stopped smoking 3/14/19   Substance and Sexual Activity   • Alcohol use: No   • Drug use: Never   • Sexual activity: Defer       Family History   Problem Relation Age of Onset   • Heart failure Mother         Congestive   • Hypertension Mother    • Heart failure Sister         Congestive   • Coronary artery disease Sister    • Heart attack Sister         Acute   • Hypertension Sister    • Sudden death Sister    • Coronary artery disease Brother    • Seizures Brother    • Hypertension Brother    • Heart failure Maternal Grandmother         Congestive       Review of Systems   Constitutional: Negative for fatigue and fever.   HENT: Negative for congestion, rhinorrhea and sneezing.    Eyes: Positive for visual disturbance (wears glasses daily).   Respiratory: Positive for shortness of breath (easily SOA; worse on exertion ) and wheezing (occasional wheezing). Negative for cough and chest tightness.         Seeing ; wears o2 HS     Cardiovascular: Negative for chest pain, palpitations and leg swelling.   Gastrointestinal: Negative for abdominal pain, constipation, diarrhea, nausea and vomiting.   Endocrine: Negative for cold intolerance and heat intolerance.   Genitourinary: Negative for difficulty urinating, frequency, hematuria and urgency.   Musculoskeletal: Positive for arthralgias (joints) and back pain (back pain from arthritis and old injury). Negative for neck pain and neck stiffness.   Skin: Negative for rash and wound.   Allergic/Immunologic: Negative for environmental allergies and food allergies.   Neurological: Negative for dizziness, syncope, weakness, light-headedness and numbness.  "  Hematological: Bruises/bleeds easily (bruises and bleeds easily).   Psychiatric/Behavioral: Positive for agitation (easily agitated) and confusion (easily confused). Negative for sleep disturbance (denies waking up smothering/SOA). The patient is nervous/anxious (easily nervous/anxious).        Objective   /80 (BP Location: Left arm, Patient Position: Sitting)   Pulse 84   Temp 97.4 °F (36.3 °C)   Ht 154.9 cm (61\")   Wt 85.7 kg (189 lb)   SpO2 98%   BMI 35.71 kg/m²   Vitals:    06/25/20 1313   BP: 135/80   BP Location: Left arm   Patient Position: Sitting   Pulse: 84   Temp: 97.4 °F (36.3 °C)   SpO2: 98%   Weight: 85.7 kg (189 lb)   Height: 154.9 cm (61\")      Lab Results (most recent)     None        Physical Exam   Constitutional: She is oriented to person, place, and time. Vital signs are normal. She appears well-developed and well-nourished. She is active and cooperative.   HENT:   Head: Normocephalic.   Eyes: Lids are normal.   Wears glasses    Neck: Normal carotid pulses, no hepatojugular reflux and no JVD present. Carotid bruit is not present.   Cardiovascular: Normal rate, regular rhythm and normal heart sounds.   Pulses:       Radial pulses are 2+ on the right side, and 2+ on the left side.        Dorsalis pedis pulses are 2+ on the right side, and 2+ on the left side.        Posterior tibial pulses are 2+ on the right side, and 2+ on the left side.   No edema  BLE.   Pulmonary/Chest: Effort normal and breath sounds normal.   Abdominal: Normal appearance and bowel sounds are normal.   Neurological: She is alert and oriented to person, place, and time.   Skin: Skin is warm, dry and intact.   Psychiatric: She has a normal mood and affect. Her speech is normal and behavior is normal. Judgment and thought content normal. Cognition and memory are normal.       Procedure   Procedures         Assessment/Plan      Diagnosis Plan   1. Coronary artery disease involving native coronary artery of native " heart without angina pectoris     2. Stenosis of left vertebral artery     3. Peripheral vascular disease (CMS/HCC)     4. Palpitations     5. Orthostatic hypotension     6. Hypercholesterolemia     7. Essential hypertension     8. Shortness of breath     9. Gastroesophageal reflux disease, esophagitis presence not specified  pantoprazole (PROTONIX) 40 MG EC tablet       Return in about 6 months (around 12/25/2020).    CAD-patient's on aspirin, statin and beta.  Left vertebral artery stenosis-patient is on aspirin and statin.  Peripheral vascular disease-patient's on aspirin and statin.  Palpitations-stable on beta-blocker.  Orthostatic hypotension-resolved with Midodrine.  Hypercholesteremia-patient is on Lipitor.  Hypertension-patient is doing well on hydrochlorothiazide, losartan and metoprolol.  Shortness of breath-stable and following pulmonary.  GERD-patient uses Protonix.  She will continue her medication regimen.  She will follow-up in 6 months or sooner if any changes.       Barby Sue  reports that she has been smoking cigarettes. She has a 20.00 pack-year smoking history. She has never used smokeless tobacco.. I have educated her on the risk of diseases from using tobacco products such as cancer, COPD and heart diease.       Patient's Body mass index is 35.71 kg/m². BMI is above normal parameters. Recommendations include: educational material and referral to primary care.      Electronically signed by:

## 2020-06-29 DIAGNOSIS — I25.10 CAD IN NATIVE ARTERY: ICD-10-CM

## 2020-06-29 RX ORDER — CLOPIDOGREL BISULFATE 75 MG/1
75 TABLET ORAL DAILY
Qty: 90 TABLET | Refills: 3 | Status: SHIPPED | OUTPATIENT
Start: 2020-06-29 | End: 2021-07-28

## 2020-08-11 DIAGNOSIS — R07.2 PRECORDIAL PAIN: ICD-10-CM

## 2020-08-11 DIAGNOSIS — I25.10 CAD IN NATIVE ARTERY: ICD-10-CM

## 2020-08-11 RX ORDER — NITROGLYCERIN 0.4 MG/1
0.4 TABLET SUBLINGUAL
Qty: 25 TABLET | Refills: 5 | Status: SHIPPED | OUTPATIENT
Start: 2020-08-11

## 2020-12-15 ENCOUNTER — OFFICE VISIT (OUTPATIENT)
Dept: CARDIOLOGY | Facility: CLINIC | Age: 65
End: 2020-12-15

## 2020-12-15 ENCOUNTER — LAB (OUTPATIENT)
Dept: CARDIOLOGY | Facility: CLINIC | Age: 65
End: 2020-12-15

## 2020-12-15 VITALS
HEART RATE: 89 BPM | SYSTOLIC BLOOD PRESSURE: 138 MMHG | OXYGEN SATURATION: 96 % | DIASTOLIC BLOOD PRESSURE: 85 MMHG | BODY MASS INDEX: 35.53 KG/M2 | WEIGHT: 188.2 LBS | HEIGHT: 61 IN

## 2020-12-15 DIAGNOSIS — E66.01 MORBIDLY OBESE (HCC): ICD-10-CM

## 2020-12-15 DIAGNOSIS — I25.10 CAD IN NATIVE ARTERY: ICD-10-CM

## 2020-12-15 DIAGNOSIS — I73.9 PERIPHERAL VASCULAR DISEASE (HCC): ICD-10-CM

## 2020-12-15 DIAGNOSIS — I10 ESSENTIAL HYPERTENSION: ICD-10-CM

## 2020-12-15 DIAGNOSIS — I25.10 CAD IN NATIVE ARTERY: Primary | ICD-10-CM

## 2020-12-15 DIAGNOSIS — R06.02 BREATH SHORTNESS: ICD-10-CM

## 2020-12-15 DIAGNOSIS — E78.00 HYPERCHOLESTEROLEMIA: ICD-10-CM

## 2020-12-15 DIAGNOSIS — E78.5 DYSLIPIDEMIA: ICD-10-CM

## 2020-12-15 DIAGNOSIS — R00.2 PALPITATIONS: ICD-10-CM

## 2020-12-15 DIAGNOSIS — E78.00 ELEVATED CHOLESTEROL: ICD-10-CM

## 2020-12-15 DIAGNOSIS — I65.23 BILATERAL CAROTID ARTERY STENOSIS: ICD-10-CM

## 2020-12-15 DIAGNOSIS — I65.02 STENOSIS OF LEFT VERTEBRAL ARTERY: ICD-10-CM

## 2020-12-15 DIAGNOSIS — Z00.00 HEALTHCARE MAINTENANCE: ICD-10-CM

## 2020-12-15 LAB
ALBUMIN SERPL-MCNC: 4.17 G/DL (ref 3.5–5.2)
ALBUMIN/GLOB SERPL: 1.4 G/DL
ALP SERPL-CCNC: 148 U/L (ref 39–117)
ALT SERPL W P-5'-P-CCNC: 19 U/L (ref 1–33)
ANION GAP SERPL CALCULATED.3IONS-SCNC: 11.1 MMOL/L (ref 5–15)
AST SERPL-CCNC: 17 U/L (ref 1–32)
BASOPHILS # BLD AUTO: 0.09 10*3/MM3 (ref 0–0.2)
BASOPHILS NFR BLD AUTO: 1 % (ref 0–1.5)
BILIRUB SERPL-MCNC: 0.3 MG/DL (ref 0–1.2)
BUN SERPL-MCNC: 14 MG/DL (ref 8–23)
BUN/CREAT SERPL: 17.3 (ref 7–25)
CALCIUM SPEC-SCNC: 9.9 MG/DL (ref 8.6–10.5)
CHLORIDE SERPL-SCNC: 101 MMOL/L (ref 98–107)
CHOLEST SERPL-MCNC: 166 MG/DL (ref 0–200)
CO2 SERPL-SCNC: 23.9 MMOL/L (ref 22–29)
CREAT SERPL-MCNC: 0.81 MG/DL (ref 0.57–1)
DEPRECATED RDW RBC AUTO: 47.7 FL (ref 37–54)
EOSINOPHIL # BLD AUTO: 0.29 10*3/MM3 (ref 0–0.4)
EOSINOPHIL NFR BLD AUTO: 3.3 % (ref 0.3–6.2)
ERYTHROCYTE [DISTWIDTH] IN BLOOD BY AUTOMATED COUNT: 13.6 % (ref 12.3–15.4)
GFR SERPL CREATININE-BSD FRML MDRD: 71 ML/MIN/1.73
GLOBULIN UR ELPH-MCNC: 3 GM/DL
GLUCOSE SERPL-MCNC: 96 MG/DL (ref 65–99)
HCT VFR BLD AUTO: 40.9 % (ref 34–46.6)
HDLC SERPL-MCNC: 34 MG/DL (ref 40–60)
HGB BLD-MCNC: 13.3 G/DL (ref 12–15.9)
IMM GRANULOCYTES # BLD AUTO: 0.02 10*3/MM3 (ref 0–0.05)
IMM GRANULOCYTES NFR BLD AUTO: 0.2 % (ref 0–0.5)
LDLC SERPL CALC-MCNC: 107 MG/DL (ref 0–100)
LDLC/HDLC SERPL: 3.08 {RATIO}
LYMPHOCYTES # BLD AUTO: 2.63 10*3/MM3 (ref 0.7–3.1)
LYMPHOCYTES NFR BLD AUTO: 29.9 % (ref 19.6–45.3)
MAGNESIUM SERPL-MCNC: 1.9 MG/DL (ref 1.6–2.4)
MCH RBC QN AUTO: 30.6 PG (ref 26.6–33)
MCHC RBC AUTO-ENTMCNC: 32.5 G/DL (ref 31.5–35.7)
MCV RBC AUTO: 94.2 FL (ref 79–97)
MONOCYTES # BLD AUTO: 0.63 10*3/MM3 (ref 0.1–0.9)
MONOCYTES NFR BLD AUTO: 7.2 % (ref 5–12)
NEUTROPHILS NFR BLD AUTO: 5.15 10*3/MM3 (ref 1.7–7)
NEUTROPHILS NFR BLD AUTO: 58.4 % (ref 42.7–76)
NRBC BLD AUTO-RTO: 0 /100 WBC (ref 0–0.2)
PLATELET # BLD AUTO: 285 10*3/MM3 (ref 140–450)
PMV BLD AUTO: 9.7 FL (ref 6–12)
POTASSIUM SERPL-SCNC: 3.8 MMOL/L (ref 3.5–5.2)
PROT SERPL-MCNC: 7.2 G/DL (ref 6–8.5)
RBC # BLD AUTO: 4.34 10*6/MM3 (ref 3.77–5.28)
SODIUM SERPL-SCNC: 136 MMOL/L (ref 136–145)
TRIGL SERPL-MCNC: 137 MG/DL (ref 0–150)
TSH SERPL DL<=0.05 MIU/L-ACNC: 1.39 UIU/ML (ref 0.27–4.2)
VLDLC SERPL-MCNC: 25 MG/DL (ref 5–40)
WBC # BLD AUTO: 8.81 10*3/MM3 (ref 3.4–10.8)

## 2020-12-15 PROCEDURE — 99214 OFFICE O/P EST MOD 30 MIN: CPT | Performed by: NURSE PRACTITIONER

## 2020-12-15 PROCEDURE — 84443 ASSAY THYROID STIM HORMONE: CPT | Performed by: NURSE PRACTITIONER

## 2020-12-15 PROCEDURE — 80053 COMPREHEN METABOLIC PANEL: CPT | Performed by: NURSE PRACTITIONER

## 2020-12-15 PROCEDURE — 85025 COMPLETE CBC W/AUTO DIFF WBC: CPT | Performed by: NURSE PRACTITIONER

## 2020-12-15 PROCEDURE — 80061 LIPID PANEL: CPT | Performed by: NURSE PRACTITIONER

## 2020-12-15 PROCEDURE — 83735 ASSAY OF MAGNESIUM: CPT | Performed by: NURSE PRACTITIONER

## 2020-12-15 NOTE — PROGRESS NOTES
Subjective   Barby Sue is a 65 y.o. female     Chief Complaint   Patient presents with   • Follow-up   • Coronary Artery Disease       HPI    Problem List:    1. Coronary artery disease  1.1 left heart cath 3/20/1 12-20% right coronary artery, circumflex 50-70% proximal, 20-30% first diagonal, EF 60%  1.2 Follow up stress August 2015 demonstrated chest wall attenuation but no sign of ischemia, preserved ER  1.3 Stress Test 12/14/16 - no ischemia; low risk  1.4 stress test 3/20/19-possible inferior basilar ischemia, preserved post stress EF of 83%, increased transient ischemic dilation ratio of 0.43  1.5 LHC 4/22/19 -stent proximal circumflex, EF 55 to 60%, LVEDP 16-18, no aortic valve gradient present on catheter pullback  2. PVD with history of aortobifemoral bypass 2014  2.1 BLE Arterial U/S 12/14/16 - < or equal to 50% right common femoral artery; high-grade stenosis left posterior tibial artery 50-75% stenosis  2.2 CTA Abd with runoff 5/16/17 - patent aorta bi-iliac graft, mild narrowing seen at prox aspect of the celiac trunk.  No high grade stenosis. Fatty infiltration of the liver.   3. Chronic hypotension   4. Hyperlipidemia   5. Carotid Artery Disease   5.1 Carotid Artery U/S 12/14/16 - 50-75% right external carotid; 16-49% KENNEDY and LICA; antegrade flow both vertebral arteries   5.2 Carotid Artery US 2/13/19 - LICA 50-69%; less than 50% on the right   5.3 CTA head 2/15/19 - left external carotid artery is occluded, no sig stenosis KENNEDY; left vertebral artery at arch is near occluded; enlarged precarinal node 1.4 x 1.5 and right of the midline measuring 1.1 x 1.6, emphysema, per patient Neuroradiologist stated not blocked as indicated in CT  6. Echo 12/14/16 - mild LVH; EF > 65%; DD I; trace MR; physiological TR; mild MN  6.1 echo 3/20/19-mild LVH, EF 61-65%, diastolic dysfunction 1  7. COPD/Emphysema, Dr. Garcia using O2 2L NC @ HS    8. Palpitations  8.1 Event Monitor 3/31-4/11/17 - NSR - ST    Patient  is a 65-year-old female who presents today for follow-up.  She denies any chest pain, pressure, dizziness, presyncope, syncope, orthopnea, PND or edema.  She says that she has palpitations just on a rare occasion at night and they do not last long.  She says she is now sleeping with oxygen and it does seem to be helping.  She says she has shortness of breath with normal daily activity and she follows Dr. Garcia.  She says she is worn out by the end of the day but she is also taking care of her 5-year-old granddaughter and 11-year-old granddaughter.  She watches both of them and helps him with her school work 6 days a week.  She has not had any recent blood work but she has not eaten today so we will get her blood work today.  Patient still smokes a half a pack a day.    Current Outpatient Medications on File Prior to Visit   Medication Sig Dispense Refill   • Acetaminophen (TYLENOL ARTHRITIS PAIN PO) Take 1 tablet by mouth Every 8 (Eight) Hours As Needed.     • aspirin 81 MG EC tablet Take 81 mg by mouth Daily.     • atorvastatin (LIPITOR) 80 MG tablet Take 1 tablet by mouth Daily. 90 tablet 3   • Biotin 1000 MCG tablet Take 1 tablet by mouth daily.     • BLACK COHOSH PO Take 1,000 mg by mouth daily.     • CloNIDine (CATAPRES) 0.1 MG tablet Take 1 tablet by mouth Every 12 (Twelve) Hours. 180 tablet 3   • clopidogrel (PLAVIX) 75 MG tablet TAKE 1 TABLET BY MOUTH DAILY. 90 tablet 3   • cyclobenzaprine (FLEXERIL) 10 MG tablet Take 1 tablet by mouth 3 (three) times a day.     • docusate sodium (COLACE) 100 MG capsule Take 100 mg by mouth As Needed.  5   • escitalopram (LEXAPRO) 10 MG tablet Take 10 mg by mouth Daily.  0   • fluticasone (FLONASE) 50 MCG/ACT nasal spray   3   • furosemide (LASIX) 20 MG tablet Take 1 tablet by mouth Daily. 90 tablet 3   • gabapentin (NEURONTIN) 400 MG capsule Take 400 mg by mouth 3 (Three) Times a Day.     • hydroCHLOROthiazide (HYDRODIURIL) 12.5 MG tablet Take 1 tablet by mouth Daily. 90  tablet 3   • isosorbide mononitrate (IMDUR) 30 MG 24 hr tablet Take 1 tablet by mouth Daily. 90 tablet 3   • losartan (COZAAR) 100 MG tablet Take 1 tablet by mouth Daily. 90 tablet 3   • metoprolol tartrate 75 MG tablet Take 75 mg by mouth Every 12 (Twelve) Hours. 270 tablet 1   • midodrine (PROAMATINE) 2.5 MG tablet TAKE 1 TABLET BY MOUTH 3 (THREE) TIMES A DAY. 270 tablet 3   • nitroglycerin (NITROSTAT) 0.4 MG SL tablet PLACE 1 TABLET UNDER THE TONGUE EVERY 5 (FIVE) MINUTES AS NEEDED FOR CHEST PAIN. ONLY TAKE UP TO 3 TABLETS. CALL 911 IF PAIN PERSISTS. 25 tablet 5   • oxyCODONE-acetaminophen (PERCOCET)  MG per tablet Take 1 tablet by mouth every 8 (eight) hours as needed for moderate pain (4-6).     • pantoprazole (PROTONIX) 40 MG EC tablet Take 1 tablet by mouth Daily. 90 tablet 3   • potassium chloride (K-DUR,KLOR-CON) 20 MEQ CR tablet TAKE 1 CAPSULE BY MOUTH DAILY. 90 tablet 3   • Umeclidinium Bromide 62.5 MCG/INH aerosol powder  Inhale 1 puff Daily. 90 each 3   • vitamin D (ERGOCALCIFEROL) 14642 UNITS capsule capsule 1 (One) Time Per Week.     • [DISCONTINUED] busPIRone (BUSPAR) 7.5 MG tablet Take 7.5 mg by mouth 2 (Two) Times a Day.  0     No current facility-administered medications on file prior to visit.        ALLERGIES    Lisinopril, Adhesive tape, Ibuprofen, and Latex    Past Medical History:   Diagnosis Date   • Anxiety    • Back pain    • Carotid bruit    • Chest pain    • COPD (chronic obstructive pulmonary disease) (CMS/HCC)    • Dyslipidemia    • Fainting    • Functional murmur    • GERD (gastroesophageal reflux disease)    • Hypercholesterolemia    • Hyperlipidemia    • Hypertension    • Hypokalemia    • Orthostatic hypotension    • Orthostatic hypotension    • Peripheral vascular disease (CMS/HCC)    • Peripheral vascular disease (CMS/HCC)     PVD with history of aortobifemoral bypass   • Shortness of breath    • Sleep apnea        Social History     Socioeconomic History   • Marital status:       Spouse name: Not on file   • Number of children: Not on file   • Years of education: Not on file   • Highest education level: Not on file   Tobacco Use   • Smoking status: Current Every Day Smoker     Packs/day: 0.50     Years: 40.00     Pack years: 20.00     Types: Cigarettes   • Smokeless tobacco: Never Used   • Tobacco comment: Pt states she stopped smoking 3/14/19   Substance and Sexual Activity   • Alcohol use: No   • Drug use: Never   • Sexual activity: Defer       Family History   Problem Relation Age of Onset   • Heart failure Mother         Congestive   • Hypertension Mother    • Heart failure Sister         Congestive   • Coronary artery disease Sister    • Heart attack Sister         Acute   • Hypertension Sister    • Sudden death Sister    • Coronary artery disease Brother    • Seizures Brother    • Hypertension Brother    • Heart failure Maternal Grandmother         Congestive       Review of Systems   Constitutional: Positive for fatigue (Keeps 2 grandkids during the day, then gets worn out by the end of the day ). Negative for diaphoresis.   HENT: Negative for congestion, rhinorrhea and sore throat.    Eyes: Positive for visual disturbance (glasses daily ).   Respiratory: Positive for apnea (02 at HS- Dr. Garcia ) and shortness of breath (w/ normal daily activity; uses O2 @ HS and it helps ). Negative for chest tightness.    Cardiovascular: Positive for palpitations (Occasional flutter; usually at night, does not last long ). Negative for chest pain and leg swelling.   Gastrointestinal: Positive for constipation (Colace helps PRN ). Negative for abdominal pain, blood in stool, diarrhea, nausea and vomiting.   Endocrine: Positive for heat intolerance (Always hot ). Negative for cold intolerance.   Genitourinary: Negative for difficulty urinating, dysuria, frequency, hematuria and urgency.   Musculoskeletal: Positive for arthralgias and back pain. Negative for neck pain.   Skin: Negative for  "rash and wound.   Allergic/Immunologic: Negative for environmental allergies and food allergies.   Neurological: Negative for dizziness, syncope, weakness, light-headedness, numbness and headaches.   Hematological: Bruises/bleeds easily (Both ).   Psychiatric/Behavioral: Positive for sleep disturbance (Issues falling asleep and staying asleep, wears 02 at HS ).       Objective   /85   Pulse 89   Ht 154.9 cm (61\")   Wt 85.4 kg (188 lb 3.2 oz)   SpO2 96%   BMI 35.56 kg/m²   Vitals:    12/15/20 1318   BP: 138/85   Pulse: 89   SpO2: 96%   Weight: 85.4 kg (188 lb 3.2 oz)   Height: 154.9 cm (61\")      Lab Results (most recent)     None        Physical Exam  Vitals signs reviewed.   Constitutional:       General: She is awake.      Appearance: Normal appearance. She is well-developed and well-groomed. She is obese.   HENT:      Head: Normocephalic.   Eyes:      General: Lids are normal.      Comments: Wears glasses    Neck:      Vascular: No carotid bruit, hepatojugular reflux or JVD.   Cardiovascular:      Rate and Rhythm: Normal rate and regular rhythm.      Pulses:           Radial pulses are 2+ on the right side and 2+ on the left side.        Dorsalis pedis pulses are 2+ on the right side and 2+ on the left side.        Posterior tibial pulses are 2+ on the right side and 2+ on the left side.      Heart sounds: Normal heart sounds.   Pulmonary:      Effort: Pulmonary effort is normal.      Breath sounds: Normal breath sounds and air entry.   Abdominal:      General: Bowel sounds are normal.      Palpations: Abdomen is soft.   Musculoskeletal:      Right lower leg: No edema.      Left lower leg: No edema.   Skin:     General: Skin is warm and dry.   Neurological:      Mental Status: She is alert and oriented to person, place, and time.   Psychiatric:         Attention and Perception: Attention and perception normal.         Mood and Affect: Mood and affect normal.         Speech: Speech normal.         " Behavior: Behavior normal. Behavior is cooperative.         Thought Content: Thought content normal.         Cognition and Memory: Cognition and memory normal.         Judgment: Judgment normal.         Procedure   Procedures         Assessment/Plan      Diagnosis Plan   1. CAD in native artery     2. Elevated cholesterol     3. Essential hypertension     4. Palpitations     5. Hypercholesterolemia     6. Peripheral vascular disease (CMS/HCC)     7. Stenosis of left vertebral artery     8. Breath shortness     9. Bilateral carotid artery stenosis         Return in about 6 months (around 6/15/2021).    CAD-patient is on aspirin, statin, Plavix and beta.  Hyperlipidemia-patient is on Lipitor.  She is getting labs today.  Hypertension-patient is doing very well on hydrochlorothiazide, losartan and metoprolol.  Palpitations-stable on beta-blocker.  PVD-patient is on aspirin and statin.  Stenosis of left vertebral artery-patient is on aspirin and statin.  Shortness of breath-stable and follows Dr. Garcia.  Bilateral carotid artery disease-patient is on aspirin and statin.  She will continue her medication regimen.  She will follow up in 6 months or sooner if any changes.  Patient is getting mag, TSH, lipid, CBC and CMP today.  These were previously ordered but patient never got them.       Barby Sue  reports that she has been smoking cigarettes. She has a 20.00 pack-year smoking history. She has never used smokeless tobacco.. I have educated her on the risk of diseases from using tobacco products such as cancer, COPD and heart disease.     I advised her to quit and she is not willing to quit.      Patient's Body mass index is 35.56 kg/m². BMI is above normal parameters. Recommendations include: educational material.      Electronically signed by:

## 2020-12-15 NOTE — PATIENT INSTRUCTIONS
How to Quarantine at Home  Information for Patients and Families    These instructions are for people with confirmed or suspected COVID-19 who do not need to be hospitalized and those with confirmed COVID-19 who were hospitalized and discharged to care for themselves at home.    If you were tested through the Health Department  The Health Department will monitor your wellbeing.  If it is determined that you do not need to be hospitalized and can be isolated at home, you will be monitored by staff from your local or state health department.     If you were tested through a Commercial [facility]  You will need to monitor yourself and report changes in your symptoms to your doctor.  See the section below called Monitor Your Symptoms.    Follow these steps until a healthcare provider or local or state health department says you can return to your normal activities.    Stay home except to get medical care  • Restrict activities outside your home, except for getting medical care.   • Do not go to work, school, or public areas.   • Avoid using public transportation, ride-sharing, or taxis.    Separate yourself from other people and animals in your home  People  As much as possible, you should stay in a specific room and away from other people in your home. Also, you should use a separate bathroom, if available.    Animals  You should restrict contact with pets and other animals while you are sick with COVID-19, just like you would around other people. When possible, have another member of your household care for your animals while you are sick. If you are sick with COVID-19, avoid contact with your pet, including petting, snuggling, being kissed or licked, and sharing food. If you must care for your pet or be around animals while you are sick, wash your hands before and after you interact with pets and wear a facemask. See COVID-19 and Animals for more information.    Call ahead before visiting your doctor  If you have a medical  appointment, call the healthcare provider and tell them that you have or may have COVID-19. This information will help the healthcare provider’s office take steps to keep other people from getting infected or exposed.    Wear a facemask  You should wear a facemask when you are around other people (e.g., sharing a room or vehicle) or pets and before you enter a healthcare provider’s office.     If you are not able to wear a facemask (for example, because it causes trouble breathing), then people who live with you should not stay in the same room with you, or they should wear a facemask if they enter your room.    Cover your coughs and sneezes  • Cover your mouth and nose with a tissue when you cough or sneeze.   • Throw used tissues in a lined trash can.   • Immediately wash your hands with soap and water for at least 20 seconds or, if soap and water are not available, clean your hands with an alcohol-based hand  that contains at least 60% alcohol.    Clean your hands often  • Wash your hands often with soap and water for at least 20 seconds, especially after blowing your nose, coughing, or sneezing; going to the bathroom; and before eating or preparing food.     • If soap and water are not readily available, use an alcohol-based hand  with at least 60% alcohol, covering all surfaces of your hands and rubbing them together until they feel dry.    • Soap and water are the best option if hands are visibly dirty. Avoid touching your eyes, nose, and mouth with unwashed hands.    Avoid sharing personal household items  • You should not share dishes, drinking glasses, cups, eating utensils, towels, or bedding with other people or pets in your home.   • After using these items, they should be washed thoroughly with soap and water.    Clean all “high-touch” surfaces everyday  • High touch surfaces include counters, tabletops, doorknobs, bathroom fixtures, toilets, phones, keyboards, tablets, and bedside  tables.   • Also, clean any surfaces that may have blood, stool, or body fluids on them.   • Use a household cleaning spray or wipe, according to the label instructions. Labels contain instructions for safe and effective use of the cleaning product, including precautions you should take when applying the product, such as wearing gloves and making sure you have good ventilation during use of the product.    Monitor your symptoms  • Seek prompt medical attention if your illness is worsening (e.g., difficulty breathing).   • Before seeking care, call your healthcare provider and tell them that you have, or are being evaluated for, COVID-19.   • Put on a facemask before you enter the facility.     • These steps will help the healthcare provider’s office to keep other people in the office or waiting room from getting infected or exposed.   • Persons who are placed under active monitoring or facilitated self-monitoring should follow instructions provided by their local health department or occupational health professionals, as appropriate.  • If you have a medical emergency and need to call 911, notify the dispatch personnel that you have, or are being evaluated for COVID-19. If possible, put on a facemask before emergency medical services arrive.    Discontinuing home isolation  Patients with confirmed COVID-19 should remain under home isolation precautions until the risk of secondary transmission to others is thought to be low. The decision to discontinue home isolation precautions should be made on a case-by-case basis, in consultation with healthcare providers and state and local health departments.    The below content are for household members, intimate partners, and caregivers of a patient with symptomatic laboratory-confirmed COVID-19 or a patient under investigation:    Household members, intimate partners, and caregivers may have close contact with a person with symptomatic, laboratory-confirmed COVID-19 or a  person under investigation.     Close contacts should monitor their health; they should call their healthcare provider right away if they develop symptoms suggestive of COVID-19 (e.g., fever, cough, shortness of breath)     Close contacts should also follow these recommendations:  • Make sure that you understand and can help the patient follow their healthcare provider’s instructions for medication(s) and care. You should help the patient with basic needs in the home and provide support for getting groceries, prescriptions, and other personal needs.  • Monitor the patient’s symptoms. If the patient is getting sicker, call his or her healthcare provider and tell them that the patient has laboratory-confirmed COVID-19. This will help the healthcare provider’s office take steps to keep other people in the office or waiting room from getting infected. Ask the healthcare provider to call the local or [facility] health department for additional guidance. If the patient has a medical emergency and you need to call 911, notify the dispatch personnel that the patient has, or is being evaluated for COVID-19.  • Household members should stay in another room or be  from the patient as much as possible. Household members should use a separate bedroom and bathroom, if available.  • Prohibit visitors who do not have an essential need to be in the home.  • Household members should care for any pets in the home. Do not handle pets or other animals while sick.  For more information, see COVID-19 and Animals.  • Make sure that shared spaces in the home have good air flow, such as by an air conditioner or an opened window, weather permitting.  • Perform hand hygiene frequently. Wash your hands often with soap and water for at least 20 seconds or use an alcohol-based hand  that contains 60 to 95% alcohol, covering all surfaces of your hands and rubbing them together until they feel dry. Soap and water should be used  preferentially if hands are visibly dirty.  • Avoid touching your eyes, nose, and mouth with unwashed hands.  • The patient should wear a facemask when you are around other people. If the patient is not able to wear a facemask (for example, because it causes trouble breathing), you, as the caregiver, should wear a mask when you are in the same room as the patient.  • Wear a disposable facemask and gloves when you touch or have contact with the patient’s blood, stool, or body fluids, such as saliva, sputum, nasal mucus, vomit, or urine.   o Throw out disposable facemasks and gloves after using them. Do not reuse.  o When removing personal protective equipment, first remove and dispose of gloves. Then, immediately clean your hands with soap and water or alcohol-based hand . Next, remove and dispose of facemask, and immediately clean your hands again with soap and water or alcohol-based hand .  • Avoid sharing household items with the patient. You should not share dishes, drinking glasses, cups, eating utensils, towels, bedding, or other items. After the patient uses these items, you should wash them thoroughly (see below “Wash laundry thoroughly”).  • Clean all “high-touch” surfaces, such as counters, tabletops, doorknobs, bathroom fixtures, toilets, phones, keyboards, tablets, and bedside tables, every day. Also, clean any surfaces that may have blood, stool, or body fluids on them.   o Use a household cleaning spray or wipe, according to the label instructions. Labels contain instructions for safe and effective use of the cleaning product including precautions you should take when applying the product, such as wearing gloves and making sure you have good ventilation during use of the product.  • Wash laundry thoroughly.   o Immediately remove and wash clothes or bedding that have blood, stool, or body fluids on them.  o Wear disposable gloves while handling soiled items and keep soiled items away  from your body. Clean your hands (with soap and water or an alcohol-based hand ) immediately after removing your gloves.  o Read and follow directions on labels of laundry or clothing items and detergent. In general, using a normal laundry detergent according to washing machine instructions and dry thoroughly using the warmest temperatures recommended on the clothing label.  • Place all used disposable gloves, facemasks, and other contaminated items in a lined container before disposing of them with other household waste. Clean your hands (with soap and water or an alcohol-based hand ) immediately after handling these items. Soap and water should be used preferentially if hands are visibly dirty.  • Discuss any additional questions with your state or local health department or healthcare provider.    Adapted from information provided by the Centers for Disease Control and Prevention.  For more information, visit https://www.cdc.gov/coronavirus/2019-ncov/hcp/guidance-prevent-spread.htmlSteps to Quit Smoking  Smoking tobacco is the leading cause of preventable death. It can affect almost every organ in the body. Smoking puts you and people around you at risk for many serious, long-lasting (chronic) diseases. Quitting smoking can be hard, but it is one of the best things that you can do for your health. It is never too late to quit.  How do I get ready to quit?  When you decide to quit smoking, make a plan to help you succeed. Before you quit:  · Pick a date to quit. Set a date within the next 2 weeks to give you time to prepare.  · Write down the reasons why you are quitting. Keep this list in places where you will see it often.  · Tell your family, friends, and co-workers that you are quitting. Their support is important.  · Talk with your doctor about the choices that may help you quit.  · Find out if your health insurance will pay for these treatments.  · Know the people, places, things, and  activities that make you want to smoke (triggers). Avoid them.  What first steps can I take to quit smoking?  · Throw away all cigarettes at home, at work, and in your car.  · Throw away the things that you use when you smoke, such as ashtrays and lighters.  · Clean your car. Make sure to empty the ashtray.  · Clean your home, including curtains and carpets.  What can I do to help me quit smoking?  Talk with your doctor about taking medicines and seeing a counselor at the same time. You are more likely to succeed when you do both.  · If you are pregnant or breastfeeding, talk with your doctor about counseling or other ways to quit smoking. Do not take medicine to help you quit smoking unless your doctor tells you to do so.  To quit smoking:  Quit right away  · Quit smoking totally, instead of slowly cutting back on how much you smoke over a period of time.  · Go to counseling. You are more likely to quit if you go to counseling sessions regularly.  Take medicine  You may take medicines to help you quit. Some medicines need a prescription, and some you can buy over-the-counter. Some medicines may contain a drug called nicotine to replace the nicotine in cigarettes. Medicines may:  · Help you to stop having the desire to smoke (cravings).  · Help to stop the problems that come when you stop smoking (withdrawal symptoms).  Your doctor may ask you to use:  · Nicotine patches, gum, or lozenges.  · Nicotine inhalers or sprays.  · Non-nicotine medicine that is taken by mouth.  Find resources  Find resources and other ways to help you quit smoking and remain smoke-free after you quit. These resources are most helpful when you use them often. They include:  · Online chats with a counselor.  · Phone quitlines.  · Printed self-help materials.  · Support groups or group counseling.  · Text messaging programs.  · Mobile phone apps. Use apps on your mobile phone or tablet that can help you stick to your quit plan. There are many  free apps for mobile phones and tablets as well as websites. Examples include Quit Guide from the CDC and smokefree.gov    What things can I do to make it easier to quit?    · Talk to your family and friends. Ask them to support and encourage you.  · Call a phone quitline (1-800-QUIT-NOW), reach out to support groups, or work with a counselor.  · Ask people who smoke to not smoke around you.  · Avoid places that make you want to smoke, such as:  ? Bars.  ? Parties.  ? Smoke-break areas at work.  · Spend time with people who do not smoke.  · Lower the stress in your life. Stress can make you want to smoke. Try these things to help your stress:  ? Getting regular exercise.  ? Doing deep-breathing exercises.  ? Doing yoga.  ? Meditating.  ? Doing a body scan. To do this, close your eyes, focus on one area of your body at a time from head to toe. Notice which parts of your body are tense. Try to relax the muscles in those areas.  How will I feel when I quit smoking?  Day 1 to 3 weeks  Within the first 24 hours, you may start to have some problems that come from quitting tobacco. These problems are very bad 2-3 days after you quit, but they do not often last for more than 2-3 weeks. You may get these symptoms:  · Mood swings.  · Feeling restless, nervous, angry, or annoyed.  · Trouble concentrating.  · Dizziness.  · Strong desire for high-sugar foods and nicotine.  · Weight gain.  · Trouble pooping (constipation).  · Feeling like you may vomit (nausea).  · Coughing or a sore throat.  · Changes in how the medicines that you take for other issues work in your body.  · Depression.  · Trouble sleeping (insomnia).  Week 3 and afterward  After the first 2-3 weeks of quitting, you may start to notice more positive results, such as:  · Better sense of smell and taste.  · Less coughing and sore throat.  · Slower heart rate.  · Lower blood pressure.  · Clearer skin.  · Better breathing.  · Fewer sick days.  Quitting smoking can be  hard. Do not give up if you fail the first time. Some people need to try a few times before they succeed. Do your best to stick to your quit plan, and talk with your doctor if you have any questions or concerns.  Summary  · Smoking tobacco is the leading cause of preventable death. Quitting smoking can be hard, but it is one of the best things that you can do for your health.  · When you decide to quit smoking, make a plan to help you succeed.  · Quit smoking right away, not slowly over a period of time.  · When you start quitting, seek help from your doctor, family, or friends.  This information is not intended to replace advice given to you by your health care provider. Make sure you discuss any questions you have with your health care provider.  Document Revised: 09/11/2020 Document Reviewed: 03/07/2020  [employer] Patient Education © 2020 [employer]  Fat and Cholesterol Restricted Eating Plan  Getting too much fat and cholesterol in your diet may cause health problems. Choosing the right foods helps keep your fat and cholesterol at normal levels. This can keep you from getting certain diseases.  Your doctor may recommend an eating plan that includes:  · Total fat: ______% or less of total calories a day.  · Saturated fat: ______% or less of total calories a day.  · Cholesterol: less than _________mg a day.  · Fiber: ______g a day.  What are tips for following this plan?  Meal planning  · At meals, divide your plate into four equal parts:  ? Fill one-half of your plate with vegetables and green salads.  ? Fill one-fourth of your plate with whole grains.  ? Fill one-fourth of your plate with low-fat (lean) protein foods.  · Eat fish that is high in omega-3 fats at least two times a week. This includes mackerel, tuna, sardines, and salmon.  · Eat foods that are high in fiber, such as whole grains, beans, apples, broccoli, carrots, peas, and barley.  General tips    · Work with your doctor to lose weight if you  "need to.  · Avoid:  ? Foods with added sugar.  ? Fried foods.  ? Foods with partially hydrogenated oils.  · Limit alcohol intake to no more than 1 drink a day for nonpregnant women and 2 drinks a day for men. One drink equals 12 oz of beer, 5 oz of wine, or 1½ oz of hard liquor.  Reading food labels  · Check food labels for:  ? Trans fats.  ? Partially hydrogenated oils.  ? Saturated fat (g) in each serving.  ? Cholesterol (mg) in each serving.  ? Fiber (g) in each serving.  · Choose foods with healthy fats, such as:  ? Monounsaturated fats.  ? Polyunsaturated fats.  ? Omega-3 fats.  · Choose grain products that have whole grains. Look for the word \"whole\" as the first word in the ingredient list.  Cooking  · Cook foods using low-fat methods. These include baking, boiling, grilling, and broiling.  · Eat more home-cooked foods. Eat at restaurants and buffets less often.  · Avoid cooking using saturated fats, such as butter, cream, palm oil, palm kernel oil, and coconut oil.  Recommended foods    Fruits  · All fresh, canned (in natural juice), or frozen fruits.  Vegetables  · Fresh or frozen vegetables (raw, steamed, roasted, or grilled). Green salads.  Grains  · Whole grains, such as whole wheat or whole grain breads, crackers, cereals, and pasta. Unsweetened oatmeal, bulgur, barley, quinoa, or brown rice. Corn or whole wheat flour tortillas.  Meats and other protein foods  · Ground beef (85% or leaner), grass-fed beef, or beef trimmed of fat. Skinless chicken or turkey. Ground chicken or turkey. Pork trimmed of fat. All fish and seafood. Egg whites. Dried beans, peas, or lentils. Unsalted nuts or seeds. Unsalted canned beans. Nut butters without added sugar or oil.  Dairy  · Low-fat or nonfat dairy products, such as skim or 1% milk, 2% or reduced-fat cheeses, low-fat and fat-free ricotta or cottage cheese, or plain low-fat and nonfat yogurt.  Fats and oils  · Tub margarine without trans fats. Light or reduced-fat " mayonnaise and salad dressings. Avocado. Olive, canola, sesame, or safflower oils.  The items listed above may not be a complete list of foods and beverages you can eat. Contact a dietitian for more information.  Foods to avoid  Fruits  · Canned fruit in heavy syrup. Fruit in cream or butter sauce. Fried fruit.  Vegetables  · Vegetables cooked in cheese, cream, or butter sauce. Fried vegetables.  Grains  · White bread. White pasta. White rice. Cornbread. Bagels, pastries, and croissants. Crackers and snack foods that contain trans fat and hydrogenated oils.  Meats and other protein foods  · Fatty cuts of meat. Ribs, chicken wings, saenz, sausage, bologna, salami, chitterlings, fatback, hot dogs, bratwurst, and packaged lunch meats. Liver and organ meats. Whole eggs and egg yolks. Chicken and turkey with skin. Fried meat.  Dairy  · Whole or 2% milk, cream, half-and-half, and cream cheese. Whole milk cheeses. Whole-fat or sweetened yogurt. Full-fat cheeses. Nondairy creamers and whipped toppings. Processed cheese, cheese spreads, and cheese curds.  Beverages  · Alcohol. Sugar-sweetened drinks such as sodas, lemonade, and fruit drinks.  Fats and oils  · Butter, stick margarine, lard, shortening, ghee, or saenz fat. Coconut, palm kernel, and palm oils.  Sweets and desserts  · Corn syrup, sugars, honey, and molasses. Candy. Jam and jelly. Syrup. Sweetened cereals. Cookies, pies, cakes, donuts, muffins, and ice cream.  The items listed above may not be a complete list of foods and beverages you should avoid. Contact a dietitian for more information.  Summary  · Choosing the right foods helps keep your fat and cholesterol at normal levels. This can keep you from getting certain diseases.  · At meals, fill one-half of your plate with vegetables and green salads.  · Eat high-fiber foods, like whole grains, beans, apples, carrots, peas, and barley.  · Limit added sugar, saturated fats, alcohol, and fried foods.  This  "information is not intended to replace advice given to you by your health care provider. Make sure you discuss any questions you have with your health care provider.  Document Revised: 08/21/2019 Document Reviewed: 09/04/2018  [employer] Patient Education © 2020 [employer]  BMI for Adults  What is BMI?  Body mass index (BMI) is a number that is calculated from a person's weight and height. BMI can help estimate how much of a person's weight is composed of fat. BMI does not measure body fat directly. Rather, it is an alternative to procedures that directly measure body fat, which can be difficult and expensive.  BMI can help identify people who may be at higher risk for certain medical problems.  What are BMI measurements used for?  BMI is used as a screening tool to identify possible weight problems. It helps determine whether a person is obese, overweight, a healthy weight, or underweight.  BMI is useful for:  · Identifying a weight problem that may be related to a medical condition or may increase the risk for medical problems.  · Promoting changes, such as changes in diet and exercise, to help reach a healthy weight. BMI screening can be repeated to see if these changes are working.  How is BMI calculated?  BMI involves measuring your weight in relation to your height. Both height and weight are measured, and the BMI is calculated from those numbers. This can be done either in English ([place] or metric measurements. Note that charts and online BMI calculators are available to help you find your BMI quickly and easily without having to do these calculations yourself.  To calculate your BMI in English ([place] measurements:    1. Measure your weight in pounds (lb).  2. Multiply the number of pounds by 703.  ? For example, for a person who weighs 180 lb, multiply that number by 703, which equals 126,540.  3. Measure your height in inches. Then multiply that number by itself to get a measurement called \"inches " "squared.\"  ? For example, for a person who is 70 inches tall, the \"inches squared\" measurement is 70 inches x 70 inches, which equals 4,900 inches squared.  4. Divide the total from step 2 (number of lb x 703) by the total from step 3 (inches squared): 126,540 ÷ 4,900 = 25.8. This is your BMI.  To calculate your BMI in metric measurements:  1. Measure your weight in kilograms (kg).  2. Measure your height in meters (m). Then multiply that number by itself to get a measurement called \"meters squared.\"  ? For example, for a person who is 1.75 m tall, the \"meters squared\" measurement is 1.75 m x 1.75 m, which is equal to 3.1 meters squared.  3. Divide the number of kilograms (your weight) by the meters squared number. In this example: 70 ÷ 3.1 = 22.6. This is your BMI.  What do the results mean?  BMI charts are used to identify whether you are underweight, normal weight, overweight, or obese. The following guidelines will be used:  · Underweight: BMI less than 18.5.  · Normal weight: BMI between 18.5 and 24.9.  · Overweight: BMI between 25 and 29.9.  · Obese: BMI of 30 or above.  Keep these notes in mind:  · Weight includes both fat and muscle, so someone with a muscular build, such as an athlete, may have a BMI that is higher than 24.9. In cases like these, BMI is not an accurate measure of body fat.  · To determine if excess body fat is the cause of a BMI of 25 or higher, further assessments may need to be done by a health care provider.  · BMI is usually interpreted in the same way for men and women.  Where to find more information  For more information about BMI, including tools to quickly calculate your BMI, go to these websites:  · Centers for Disease Control and Prevention: www.cdc.gov  · American Heart Association: www.heart.org  · National Heart, Lung, and [employer] [place]: www.nhlbi.nih.gov  Summary  · Body mass index (BMI) is a number that is calculated from a person's weight and height.  · BMI may help " estimate how much of a person's weight is composed of fat. BMI can help identify those who may be at higher risk for certain medical problems.  · BMI can be measured using English measurements or metric measurements.  · BMI charts are used to identify whether you are underweight, normal weight, overweight, or obese.  This information is not intended to replace advice given to you by your health care provider. Make sure you discuss any questions you have with your health care provider.  Document Revised: 09/09/2020 Document Reviewed: 07/17/2020  Elsevier Patient Education © 2020 [employer]

## 2020-12-16 RX ORDER — EZETIMIBE 10 MG/1
10 TABLET ORAL DAILY
Qty: 90 TABLET | Refills: 3 | Status: SHIPPED | OUTPATIENT
Start: 2020-12-16 | End: 2021-09-30

## 2020-12-16 NOTE — TELEPHONE ENCOUNTER
----- Message from Lillie Valdez sent at 12/16/2020  8:39 AM EST -----    ----- Message -----  From: Felicita Robledo APRN  Sent: 12/16/2020   6:35 AM EST  To: Lillie Valdez    Please advise patient. Her LDL is too high.  Is she taking her lipitor?  If so start zetia 10 mg PO daily and continue the lipitor as well.  Get lipids and CMP in 6 weeks.  I have forwarded to PCP.

## 2021-02-25 ENCOUNTER — TELEPHONE (OUTPATIENT)
Dept: CARDIOLOGY | Facility: CLINIC | Age: 66
End: 2021-02-25

## 2021-02-25 NOTE — TELEPHONE ENCOUNTER
Patient called triage - having chest pain after they pass she has been feeling very weak , no numbness in arms but has been having increased dizzy spell, has been feeling very nervous has also had to take a nitro pill this week she is wanting to be seen so she can calm her nerves.     Has an appt. In May but wanting to see if she can get in any sooner like next week.     Please advise on above.     [facility]

## 2021-02-26 DIAGNOSIS — E78.5 DYSLIPIDEMIA: ICD-10-CM

## 2021-02-26 DIAGNOSIS — R60.9 PERIPHERAL EDEMA: ICD-10-CM

## 2021-02-26 DIAGNOSIS — I73.9 PERIPHERAL VASCULAR DISEASE (HCC): ICD-10-CM

## 2021-02-26 DIAGNOSIS — I95.1 HYPOTENSION, POSTURAL: ICD-10-CM

## 2021-02-26 DIAGNOSIS — I65.02 STENOSIS OF LEFT VERTEBRAL ARTERY: ICD-10-CM

## 2021-02-26 DIAGNOSIS — I10 ESSENTIAL HYPERTENSION: ICD-10-CM

## 2021-02-26 DIAGNOSIS — I25.10 CAD IN NATIVE ARTERY: ICD-10-CM

## 2021-03-01 ENCOUNTER — OFFICE VISIT (OUTPATIENT)
Dept: CARDIOLOGY | Facility: CLINIC | Age: 66
End: 2021-03-01

## 2021-03-01 ENCOUNTER — LAB (OUTPATIENT)
Dept: CARDIOLOGY | Facility: CLINIC | Age: 66
End: 2021-03-01

## 2021-03-01 VITALS
SYSTOLIC BLOOD PRESSURE: 165 MMHG | BODY MASS INDEX: 35.51 KG/M2 | TEMPERATURE: 98.6 F | HEART RATE: 91 BPM | DIASTOLIC BLOOD PRESSURE: 91 MMHG | OXYGEN SATURATION: 94 % | HEIGHT: 62 IN | WEIGHT: 193 LBS

## 2021-03-01 DIAGNOSIS — I73.9 PERIPHERAL VASCULAR DISEASE (HCC): ICD-10-CM

## 2021-03-01 DIAGNOSIS — R00.2 PALPITATIONS: ICD-10-CM

## 2021-03-01 DIAGNOSIS — E66.01 MORBIDLY OBESE (HCC): ICD-10-CM

## 2021-03-01 DIAGNOSIS — I95.1 ORTHOSTATIC HYPOTENSION: ICD-10-CM

## 2021-03-01 DIAGNOSIS — I25.10 CAD IN NATIVE ARTERY: ICD-10-CM

## 2021-03-01 DIAGNOSIS — E78.00 ELEVATED CHOLESTEROL: ICD-10-CM

## 2021-03-01 DIAGNOSIS — R06.02 SHORTNESS OF BREATH: ICD-10-CM

## 2021-03-01 DIAGNOSIS — R42 DIZZINESS: ICD-10-CM

## 2021-03-01 DIAGNOSIS — I65.02 STENOSIS OF LEFT VERTEBRAL ARTERY: ICD-10-CM

## 2021-03-01 DIAGNOSIS — I51.89 GRADE I DIASTOLIC DYSFUNCTION: ICD-10-CM

## 2021-03-01 DIAGNOSIS — Z00.00 HEALTHCARE MAINTENANCE: ICD-10-CM

## 2021-03-01 DIAGNOSIS — R07.2 CHEST PAIN, PRECORDIAL: ICD-10-CM

## 2021-03-01 DIAGNOSIS — I65.23 BILATERAL CAROTID ARTERY STENOSIS: ICD-10-CM

## 2021-03-01 DIAGNOSIS — R60.9 PERIPHERAL EDEMA: ICD-10-CM

## 2021-03-01 DIAGNOSIS — R07.2 CHEST PAIN, PRECORDIAL: Primary | ICD-10-CM

## 2021-03-01 PROBLEM — R60.0 PERIPHERAL EDEMA: Status: ACTIVE | Noted: 2021-03-01

## 2021-03-01 LAB
ALBUMIN SERPL-MCNC: 4.28 G/DL (ref 3.5–5.2)
ALBUMIN/GLOB SERPL: 1.4 G/DL
ALP SERPL-CCNC: 150 U/L (ref 39–117)
ALT SERPL W P-5'-P-CCNC: 19 U/L (ref 1–33)
ANION GAP SERPL CALCULATED.3IONS-SCNC: 12.4 MMOL/L (ref 5–15)
AST SERPL-CCNC: 16 U/L (ref 1–32)
BILIRUB SERPL-MCNC: 0.3 MG/DL (ref 0–1.2)
BUN SERPL-MCNC: 17 MG/DL (ref 8–23)
BUN/CREAT SERPL: 20.2 (ref 7–25)
CALCIUM SPEC-SCNC: 9.4 MG/DL (ref 8.6–10.5)
CHLORIDE SERPL-SCNC: 103 MMOL/L (ref 98–107)
CHOLEST SERPL-MCNC: 134 MG/DL (ref 0–200)
CO2 SERPL-SCNC: 24.6 MMOL/L (ref 22–29)
CREAT SERPL-MCNC: 0.84 MG/DL (ref 0.57–1)
D DIMER PPP FEU-MCNC: 0.92 MCGFEU/ML (ref 0–0.5)
GFR SERPL CREATININE-BSD FRML MDRD: 68 ML/MIN/1.73
GLOBULIN UR ELPH-MCNC: 3 GM/DL
GLUCOSE SERPL-MCNC: 105 MG/DL (ref 65–99)
HDLC SERPL-MCNC: 32 MG/DL (ref 40–60)
LDLC SERPL CALC-MCNC: 84 MG/DL (ref 0–100)
LDLC/HDLC SERPL: 2.61 {RATIO}
MAGNESIUM SERPL-MCNC: 2.1 MG/DL (ref 1.6–2.4)
POTASSIUM SERPL-SCNC: 3.8 MMOL/L (ref 3.5–5.2)
PROT SERPL-MCNC: 7.3 G/DL (ref 6–8.5)
SODIUM SERPL-SCNC: 140 MMOL/L (ref 136–145)
T4 FREE SERPL-MCNC: 1.14 NG/DL (ref 0.93–1.7)
TRIGL SERPL-MCNC: 93 MG/DL (ref 0–150)
TSH SERPL DL<=0.05 MIU/L-ACNC: 1.77 UIU/ML (ref 0.27–4.2)
VLDLC SERPL-MCNC: 18 MG/DL (ref 5–40)

## 2021-03-01 PROCEDURE — 36415 COLL VENOUS BLD VENIPUNCTURE: CPT

## 2021-03-01 PROCEDURE — 99214 OFFICE O/P EST MOD 30 MIN: CPT | Performed by: NURSE PRACTITIONER

## 2021-03-01 PROCEDURE — 84481 FREE ASSAY (FT-3): CPT | Performed by: NURSE PRACTITIONER

## 2021-03-01 PROCEDURE — 93000 ELECTROCARDIOGRAM COMPLETE: CPT | Performed by: NURSE PRACTITIONER

## 2021-03-01 PROCEDURE — 84439 ASSAY OF FREE THYROXINE: CPT | Performed by: NURSE PRACTITIONER

## 2021-03-01 PROCEDURE — 80053 COMPREHEN METABOLIC PANEL: CPT | Performed by: NURSE PRACTITIONER

## 2021-03-01 PROCEDURE — 84443 ASSAY THYROID STIM HORMONE: CPT | Performed by: NURSE PRACTITIONER

## 2021-03-01 PROCEDURE — 80061 LIPID PANEL: CPT | Performed by: NURSE PRACTITIONER

## 2021-03-01 PROCEDURE — 83735 ASSAY OF MAGNESIUM: CPT | Performed by: NURSE PRACTITIONER

## 2021-03-01 PROCEDURE — 85379 FIBRIN DEGRADATION QUANT: CPT | Performed by: NURSE PRACTITIONER

## 2021-03-01 RX ORDER — LOSARTAN POTASSIUM 100 MG/1
100 TABLET ORAL DAILY
Qty: 90 TABLET | Refills: 3 | Status: SHIPPED | OUTPATIENT
Start: 2021-03-01 | End: 2022-04-22

## 2021-03-01 RX ORDER — ATORVASTATIN CALCIUM 80 MG/1
80 TABLET, FILM COATED ORAL DAILY
Qty: 30 TABLET | Refills: 3 | Status: SHIPPED | OUTPATIENT
Start: 2021-03-01 | End: 2021-06-30

## 2021-03-01 RX ORDER — FUROSEMIDE 20 MG/1
20 TABLET ORAL DAILY
Qty: 90 TABLET | Refills: 3 | Status: SHIPPED | OUTPATIENT
Start: 2021-03-01 | End: 2022-03-17

## 2021-03-01 RX ORDER — MIDODRINE HYDROCHLORIDE 2.5 MG/1
2.5 TABLET ORAL 3 TIMES DAILY
Qty: 270 TABLET | Refills: 3 | Status: SHIPPED | OUTPATIENT
Start: 2021-03-01 | End: 2021-06-15 | Stop reason: SDUPTHER

## 2021-03-01 RX ORDER — POTASSIUM CHLORIDE 750 MG/1
TABLET, FILM COATED, EXTENDED RELEASE ORAL
Qty: 180 TABLET | Refills: 3 | Status: SHIPPED | OUTPATIENT
Start: 2021-03-01 | End: 2021-06-15 | Stop reason: SDUPTHER

## 2021-03-01 RX ORDER — ISOSORBIDE MONONITRATE 30 MG/1
30 TABLET, EXTENDED RELEASE ORAL DAILY
Qty: 90 TABLET | Refills: 3 | Status: SHIPPED | OUTPATIENT
Start: 2021-03-01 | End: 2022-04-22

## 2021-03-01 RX ORDER — HYDROCHLOROTHIAZIDE 12.5 MG/1
12.5 TABLET ORAL DAILY
Qty: 90 TABLET | Refills: 3 | Status: SHIPPED | OUTPATIENT
Start: 2021-03-01 | End: 2022-06-13

## 2021-03-01 NOTE — PATIENT INSTRUCTIONS
Steps to Quit Smoking  Smoking tobacco is the leading cause of preventable death. It can affect almost every organ in the body. Smoking puts you and those around you at risk for developing many serious chronic diseases. Quitting smoking can be difficult, but it is one of the best things that you can do for your health. It is never too late to quit.  How do I get ready to quit?  When you decide to quit smoking, create a plan to help you succeed. Before you quit:  · Pick a date to quit. Set a date within the next 2 weeks to give you time to prepare.  · Write down the reasons why you are quitting. Keep this list in places where you will see it often.  · Tell your family, friends, and co-workers that you are quitting. Support from your loved ones can make quitting easier.  · Talk with your health care provider about your options for quitting smoking.  · Find out what treatment options are covered by your health insurance.  · Identify people, places, things, and activities that make you want to smoke (triggers). Avoid them.  What first steps can I take to quit smoking?  · Throw away all cigarettes at home, at work, and in your car.  · Throw away smoking accessories, such as ashtrays and lighters.  · Clean your car. Make sure to empty the ashtray.  · Clean your home, including curtains and carpets.  What strategies can I use to quit smoking?  Talk with your health care provider about combining strategies, such as taking medicines while you are also receiving in-person counseling. Using these two strategies together makes you more likely to succeed in quitting than if you used either strategy on its own.  · If you are pregnant or breastfeeding, talk with your health care provider about finding counseling or other support strategies to quit smoking. Do not take medicine to help you quit smoking unless your health care provider tells you to do so.  To quit smoking:  Quit right away  · Quit smoking completely, instead of  gradually reducing how much you smoke over a period of time. Research shows that stopping smoking right away is more successful than gradually quitting.  · Attend in-person counseling to help you build problem-solving skills. You are more likely to succeed in quitting if you attend counseling sessions regularly. Even short sessions of 10 minutes can be effective.  Take medicine  You may take medicines to help you quit smoking. Some medicines require a prescription and some you can purchase over-the-counter. Medicines may have nicotine in them to replace the nicotine in cigarettes. Medicines may:  · Help to stop cravings.  · Help to relieve withdrawal symptoms.  Your health care provider may recommend:  · Nicotine patches, gum, or lozenges.  · Nicotine inhalers or sprays.  · Non-nicotine medicine that is taken by mouth.  Find resources  Find resources and support systems that can help you to quit smoking and remain smoke-free after you quit. These resources are most helpful when you use them often. They include:  · Online chats with a counselor.  · Telephone quitlines.  · Printed self-help materials.  · Support groups or group counseling.  · Text messaging programs.  · Mobile phone apps or applications. Use apps that can help you stick to your quit plan by providing reminders, tips, and encouragement. There are many free apps for mobile devices as well as websites. Examples include Quit Guide from the CDC and smokefree.gov  What things can I do to make it easier to quit?    · Reach out to your family and friends for support and encouragement. Call telephone quitlines (9-788-QUIT-NOW), reach out to support groups, or work with a counselor for support.  · Ask people who smoke to avoid smoking around you.  · Avoid places that trigger you to smoke, such as bars, parties, or smoke-break areas at work.  · Spend time with people who do not smoke.  · Lessen the stress in your life. Stress can be a smoking trigger for some  people. To lessen stress, try:  ? Exercising regularly.  ? Doing deep-breathing exercises.  ? Doing yoga.  ? Meditating.  ? Performing a body scan. This involves closing your eyes, scanning your body from head to toe, and noticing which parts of your body are particularly tense. Try to relax the muscles in those areas.  How will I feel when I quit smoking?  Day 1 to 3 weeks  Within the first 24 hours of quitting smoking, you may start to feel withdrawal symptoms. These symptoms are usually most noticeable 2-3 days after quitting, but they usually do not last for more than 2-3 weeks. You may experience these symptoms:  · Mood swings.  · Restlessness, anxiety, or irritability.  · Trouble concentrating.  · Dizziness.  · Strong cravings for sugary foods and nicotine.  · Mild weight gain.  · Constipation.  · Nausea.  · Coughing or a sore throat.  · Changes in how the medicines that you take for unrelated issues work in your body.  · Depression.  · Trouble sleeping (insomnia).  Week 3 and afterward  After the first 2-3 weeks of quitting, you may start to notice more positive results, such as:  · Improved sense of smell and taste.  · Decreased coughing and sore throat.  · Slower heart rate.  · Lower blood pressure.  · Clearer skin.  · The ability to breathe more easily.  · Fewer sick days.  Quitting smoking can be very challenging. Do not get discouraged if you are not successful the first time. Some people need to make many attempts to quit before they achieve long-term success. Do your best to stick to your quit plan, and talk with your health care provider if you have any questions or concerns.  Summary  · Smoking tobacco is the leading cause of preventable death. Quitting smoking is one of the best things that you can do for your health.  · When you decide to quit smoking, create a plan to help you succeed.  · Quit smoking right away, not slowly over a period of time.  · When you start quitting, seek help from your  health care provider, family, or friends.  This information is not intended to replace advice given to you by your health care provider. Make sure you discuss any questions you have with your health care provider.  Document Revised: 09/11/2020 Document Reviewed: 03/07/2020  Elsevier Patient Education © 2020 [email or web address]  BMI for Adults  What is BMI?  Body mass index (BMI) is a number that is calculated from a person's weight and height. BMI can help estimate how much of a person's weight is composed of fat. BMI does not measure body fat directly. Rather, it is an alternative to procedures that directly measure body fat, which can be difficult and expensive.  BMI can help identify people who may be at higher risk for certain medical problems.  What are BMI measurements used for?  BMI is used as a screening tool to identify possible weight problems. It helps determine whether a person is obese, overweight, a healthy weight, or underweight.  BMI is useful for:  · Identifying a weight problem that may be related to a medical condition or may increase the risk for medical problems.  · Promoting changes, such as changes in diet and exercise, to help reach a healthy weight. BMI screening can be repeated to see if these changes are working.  How is BMI calculated?  BMI involves measuring your weight in relation to your height. Both height and weight are measured, and the BMI is calculated from those numbers. This can be done either in English ([place] or metric measurements. Note that charts and online BMI calculators are available to help you find your BMI quickly and easily without having to do these calculations yourself.  To calculate your BMI in English ([place] measurements:    1. Measure your weight in pounds (lb).  2. Multiply the number of pounds by 703.  ? For example, for a person who weighs 180 lb, multiply that number by 703, which equals 126,540.  3. Measure your height in inches. Then multiply that number by  "itself to get a measurement called \"inches squared.\"  ? For example, for a person who is 70 inches tall, the \"inches squared\" measurement is 70 inches x 70 inches, which equals 4,900 inches squared.  4. Divide the total from step 2 (number of lb x 703) by the total from step 3 (inches squared): 126,540 ÷ 4,900 = 25.8. This is your BMI.  To calculate your BMI in metric measurements:  1. Measure your weight in kilograms (kg).  2. Measure your height in meters (m). Then multiply that number by itself to get a measurement called \"meters squared.\"  ? For example, for a person who is 1.75 m tall, the \"meters squared\" measurement is 1.75 m x 1.75 m, which is equal to 3.1 meters squared.  3. Divide the number of kilograms (your weight) by the meters squared number. In this example: 70 ÷ 3.1 = 22.6. This is your BMI.  What do the results mean?  BMI charts are used to identify whether you are underweight, normal weight, overweight, or obese. The following guidelines will be used:  · Underweight: BMI less than 18.5.  · Normal weight: BMI between 18.5 and 24.9.  · Overweight: BMI between 25 and 29.9.  · Obese: BMI of 30 or above.  Keep these notes in mind:  · Weight includes both fat and muscle, so someone with a muscular build, such as an athlete, may have a BMI that is higher than 24.9. In cases like these, BMI is not an accurate measure of body fat.  · To determine if excess body fat is the cause of a BMI of 25 or higher, further assessments may need to be done by a health care provider.  · BMI is usually interpreted in the same way for men and women.  Where to find more information  For more information about BMI, including tools to quickly calculate your BMI, go to these websites:  · Centers for Disease Control and Prevention: www.cdc.gov  · American Heart Association: www.heart.org  · National Heart, Lung, and [facility] [place]: www.nhlbi.nih.gov  Summary  · Body mass index (BMI) is a number that is calculated from a " "person's weight and height.  · BMI may help estimate how much of a person's weight is composed of fat. BMI can help identify those who may be at higher risk for certain medical problems.  · BMI can be measured using English measurements or metric measurements.  · BMI charts are used to identify whether you are underweight, normal weight, overweight, or obese.  This information is not intended to replace advice given to you by your health care provider. Make sure you discuss any questions you have with your health care provider.  Document Revised: 09/09/2020 Document Reviewed: 07/17/2020  Elsevier Patient Education © 2020 [employer]    Hypertension, Adult  High blood pressure (hypertension) is when the force of blood pumping through the arteries is too strong. The arteries are the blood vessels that carry blood from the heart throughout the body. Hypertension forces the heart to work harder to pump blood and may cause arteries to become narrow or stiff. Untreated or uncontrolled hypertension can cause a heart attack, heart failure, a stroke, kidney disease, and other problems.  A blood pressure reading consists of a higher number over a lower number. Ideally, your blood pressure should be below 120/80. The first (\"top\") number is called the systolic pressure. It is a measure of the pressure in your arteries as your heart beats. The second (\"bottom\") number is called the diastolic pressure. It is a measure of the pressure in your arteries as the heart relaxes.  What are the causes?  The exact cause of this condition is not known. There are some conditions that result in or are related to high blood pressure.  What increases the risk?  Some risk factors for high blood pressure are under your control. The following factors may make you more likely to develop this condition:  · Smoking.  · Having type 2 diabetes mellitus, high cholesterol, or both.  · Not getting enough exercise or physical activity.  · Being " overweight.  · Having too much fat, sugar, calories, or salt (sodium) in your diet.  · Drinking too much alcohol.  Some risk factors for high blood pressure may be difficult or impossible to change. Some of these factors include:  · Having chronic kidney disease.  · Having a family history of high blood pressure.  · Age. Risk increases with age.  · Race. You may be at higher risk if you are .  · Gender. Men are at higher risk than women before age 45. After age 65, women are at higher risk than men.  · Having obstructive sleep apnea.  · Stress.  What are the signs or symptoms?  High blood pressure may not cause symptoms. Very high blood pressure (hypertensive crisis) may cause:  · Headache.  · Anxiety.  · Shortness of breath.  · Nosebleed.  · Nausea and vomiting.  · Vision changes.  · Severe chest pain.  · Seizures.  How is this diagnosed?  This condition is diagnosed by measuring your blood pressure while you are seated, with your arm resting on a flat surface, your legs uncrossed, and your feet flat on the floor. The cuff of the blood pressure monitor will be placed directly against the skin of your upper arm at the level of your heart. It should be measured at least twice using the same arm. Certain conditions can cause a difference in blood pressure between your right and left arms.  Certain factors can cause blood pressure readings to be lower or higher than normal for a short period of time:  · When your blood pressure is higher when you are in a health care provider's office than when you are at home, this is called white coat hypertension. Most people with this condition do not need medicines.  · When your blood pressure is higher at home than when you are in a health care provider's office, this is called masked hypertension. Most people with this condition may need medicines to control blood pressure.  If you have a high blood pressure reading during one visit or you have normal blood  pressure with other risk factors, you may be asked to:  · Return on a different day to have your blood pressure checked again.  · Monitor your blood pressure at home for 1 week or longer.  If you are diagnosed with hypertension, you may have other blood or imaging tests to help your health care provider understand your overall risk for other conditions.  How is this treated?  This condition is treated by making healthy lifestyle changes, such as eating healthy foods, exercising more, and reducing your alcohol intake. Your health care provider may prescribe medicine if lifestyle changes are not enough to get your blood pressure under control, and if:  · Your systolic blood pressure is above 130.  · Your diastolic blood pressure is above 80.  Your personal target blood pressure may vary depending on your medical conditions, your age, and other factors.  Follow these instructions at home:  Eating and drinking    · Eat a diet that is high in fiber and potassium, and low in sodium, added sugar, and fat. An example eating plan is called the DASH (Dietary Approaches to Stop Hypertension) diet. To eat this way:  ? Eat plenty of fresh fruits and vegetables. Try to fill one half of your plate at each meal with fruits and vegetables.  ? Eat whole grains, such as whole-wheat pasta, brown rice, or whole-grain bread. Fill about one fourth of your plate with whole grains.  ? Eat or drink low-fat dairy products, such as skim milk or low-fat yogurt.  ? Avoid fatty cuts of meat, processed or cured meats, and poultry with skin. Fill about one fourth of your plate with lean proteins, such as fish, chicken without skin, beans, eggs, or tofu.  ? Avoid pre-made and processed foods. These tend to be higher in sodium, added sugar, and fat.  · Reduce your daily sodium intake. Most people with hypertension should eat less than 1,500 mg of sodium a day.  · Do not drink alcohol if:  ? Your health care provider tells you not to drink.  ? You are  pregnant, may be pregnant, or are planning to become pregnant.  · If you drink alcohol:  ? Limit how much you use to:  § 0-1 drink a day for women.  § 0-2 drinks a day for men.  ? Be aware of how much alcohol is in your drink. In the [place] one drink equals one 12 oz bottle of beer (355 mL), one 5 oz glass of wine (148 mL), or one 1½ oz glass of hard liquor (44 mL).  Lifestyle    · Work with your health care provider to maintain a healthy body weight or to lose weight. Ask what an ideal weight is for you.  · Get at least 30 minutes of exercise most days of the week. Activities may include walking, swimming, or biking.  · Include exercise to strengthen your muscles (resistance exercise), such as Pilates or lifting weights, as part of your weekly exercise routine. Try to do these types of exercises for 30 minutes at least 3 days a week.  · Do not use any products that contain nicotine or tobacco, such as cigarettes, e-cigarettes, and chewing tobacco. If you need help quitting, ask your health care provider.  · Monitor your blood pressure at home as told by your health care provider.  · Keep all follow-up visits as told by your health care provider. This is important.  Medicines  · Take over-the-counter and prescription medicines only as told by your health care provider. Follow directions carefully. Blood pressure medicines must be taken as prescribed.  · Do not skip doses of blood pressure medicine. Doing this puts you at risk for problems and can make the medicine less effective.  · Ask your health care provider about side effects or reactions to medicines that you should watch for.  Contact a health care provider if you:  · Think you are having a reaction to a medicine you are taking.  · Have headaches that keep coming back (recurring).  · Feel dizzy.  · Have swelling in your ankles.  · Have trouble with your vision.  Get help right away if you:  · Develop a severe headache or confusion.  · Have unusual weakness or  numbness.  · Feel faint.  · Have severe pain in your chest or abdomen.  · Vomit repeatedly.  · Have trouble breathing.  Summary  · Hypertension is when the force of blood pumping through your arteries is too strong. If this condition is not controlled, it may put you at risk for serious complications.  · Your personal target blood pressure may vary depending on your medical conditions, your age, and other factors. For most people, a normal blood pressure is less than 120/80.  · Hypertension is treated with lifestyle changes, medicines, or a combination of both. Lifestyle changes include losing weight, eating a healthy, low-sodium diet, exercising more, and limiting alcohol.  This information is not intended to replace advice given to you by your health care provider. Make sure you discuss any questions you have with your health care provider.  Document Revised: 08/28/2019 Document Reviewed: 08/28/2019  [facility] Patient Education © 2020 [facility] [employer]    Nonspecific Chest Pain  Chest pain can be caused by many different conditions. Some causes of chest pain can be life-threatening. These will require treatment right away. Serious causes of chest pain include:  · Heart attack.  · A tear in the body's main blood vessel.  · Redness and swelling (inflammation) around your heart.  · Blood clot in your lungs.  Other causes of chest pain may not be so serious. These include:  · Heartburn.  · Anxiety or stress.  · Damage to bones or muscles in your chest.  · Lung infections.  Chest pain can feel like:  · Pain or discomfort in your chest.  · Crushing, pressure, aching, or squeezing pain.  · Burning or tingling.  · Dull or sharp pain that is worse when you move, cough, or take a deep breath.  · Pain or discomfort that is also felt in your back, neck, jaw, shoulder, or arm, or pain that spreads to any of these areas.  It is hard to know whether your pain is caused by something that is serious or something that is not so serious.  So it is important to see your doctor right away if you have chest pain.  Follow these instructions at home:  Medicines  · Take over-the-counter and prescription medicines only as told by your doctor.  · If you were prescribed an antibiotic medicine, take it as told by your doctor. Do not stop taking the antibiotic even if you start to feel better.  Lifestyle    · Rest as told by your doctor.  · Do not use any products that contain nicotine or tobacco, such as cigarettes, e-cigarettes, and chewing tobacco. If you need help quitting, ask your doctor.  · Do not drink alcohol.  · Make lifestyle changes as told by your doctor. These may include:  ? Getting regular exercise. Ask your doctor what activities are safe for you.  ? Eating a heart-healthy diet. A diet and nutrition specialist (dietitian) can help you to learn healthy eating options.  ? Staying at a healthy weight.  ? Treating diabetes or high blood pressure, if needed.  ? Lowering your stress. Activities such as yoga and relaxation techniques can help.  General instructions  · Pay attention to any changes in your symptoms. Tell your doctor about them or any new symptoms.  · Avoid any activities that cause chest pain.  · Keep all follow-up visits as told by your doctor. This is important. You may need more testing if your chest pain does not go away.  Contact a doctor if:  · Your chest pain does not go away.  · You feel depressed.  · You have a fever.  Get help right away if:  · Your chest pain is worse.  · You have a cough that gets worse, or you cough up blood.  · You have very bad (severe) pain in your belly (abdomen).  · You pass out (faint).  · You have either of these for no clear reason:  ? Sudden chest discomfort.  ? Sudden discomfort in your arms, back, neck, or jaw.  · You have shortness of breath at any time.  · You suddenly start to sweat, or your skin gets clammy.  · You feel sick to your stomach (nauseous).  · You throw up (vomit).  · You suddenly  feel lightheaded or dizzy.  · You feel very weak or tired.  · Your heart starts to beat fast, or it feels like it is skipping beats.  These symptoms may be an emergency. Do not wait to see if the symptoms will go away. Get medical help right away. Call your local emergency services (911 in the [place] Do not drive yourself to the hospital.  Summary  · Chest pain can be caused by many different conditions. The cause may be serious and need treatment right away. If you have chest pain, see your doctor right away.  · Follow your doctor's instructions for taking medicines and making lifestyle changes.  · Keep all follow-up visits as told by your doctor. This includes visits for any further testing if your chest pain does not go away.  · Be sure to know the signs that show that your condition has become worse. Get help right away if you have these symptoms.  This information is not intended to replace advice given to you by your health care provider. Make sure you discuss any questions you have with your health care provider.  Document Revised: 06/20/2019 Document Reviewed: 06/20/2019  [employer]vier Patient Education © 2020 [employer]

## 2021-03-01 NOTE — PROGRESS NOTES
Subjective   aBrby Sue is a 65 y.o. female     Chief Complaint   Patient presents with   • Follow-up   • Coronary Artery Disease       HPI    Problem List:    1. Coronary artery disease  1.1 left heart cath 3/20/12-20% right coronary artery, circumflex 50-70% proximal, 20-30% first diagonal, EF 60%  1.2 Follow up stress August 2015 demonstrated chest wall attenuation but no sign of ischemia, preserved ER  1.3 Stress Test 12/14/16 - no ischemia; low risk  1.4 stress test 3/20/19-possible inferior basilar ischemia, preserved post stress EF of 83%, increased transient ischemic dilation ratio of 0.43  1.5 LHC 4/22/19 -stent proximal circumflex, EF 55 to 60%, LVEDP 16-18, no aortic valve gradient present on catheter pullback  2. PVD with history of aortobifemoral bypass 2014  2.1 BLE Arterial U/S 12/14/16 - < or equal to 50% right common femoral artery; high-grade stenosis left posterior tibial artery 50-75% stenosis  2.2 CTA Abd with runoff 5/16/17 - patent aorta bi-iliac graft, mild narrowing seen at prox aspect of the celiac trunk.  No high grade stenosis. Fatty infiltration of the liver.   3. Chronic hypotension   4. Hyperlipidemia   5. Carotid Artery Disease   5.1 Carotid Artery U/S 12/14/16 - 50-75% right external carotid; 16-49% KENNEDY and LICA; antegrade flow both vertebral arteries   5.2 Carotid Artery US 2/13/19 - LICA 50-69%; less than 50% on the right   5.3 CTA head 2/15/19 - left external carotid artery is occluded, no sig stenosis KENNEDY; left vertebral artery at arch is near occluded; enlarged precarinal node 1.4 x 1.5 and right of the midline measuring 1.1 x 1.6, emphysema, per patient Neuroradiologist stated not blocked as indicated in CT  6. Echo 12/14/16 - mild LVH; EF > 65%; DD I; trace MR; physiological TR; mild SC  6.1 echo 3/20/19-mild LVH, EF 61-65%, diastolic dysfunction 1  7. COPD/Emphysema, Dr. Garcia using O2 2L NC @ HS    8. Palpitations  8.1 Event Monitor 3/31-4/11/17 - NSR - ST    Patient is  a 65-year-old female who presents today for a follow-up with her daughter at her side due to chest pain.  Patient says for approximately 2 to 3 weeks now she has been having chest pain midsternum that is like a pressure and then a pinch feeling.  She says that she has taken nitro up to 2 tablets.  Her last episode was last Thursday.  Patient says it does radiate into her right arm down to the elbow.  She says that whenever it happens she will get nauseated, weak, lightheaded, short of breath, fatigued and diaphoretic.  Patient also has palpitations that she says are mostly when she first lays down at night.  She says when she puts her oxygen and settles they will calm down.  She says until then it feels like she is ran a marathon.  This is separate from her chest pain.  She says she did have some dizziness and lightheadedness when she was shampooing her hair.  She denies any presyncope, syncope, orthopnea or PND.  She says she will have some swelling in her legs on occasion.  She will use her water pill as needed.  She says she does get shortness of breath at times and it just depends on what she is doing.  She has had no get up and go since having chest pain.  She has not been checking her blood pressure so she is unsure how high her blood pressure is running.    Current Outpatient Medications on File Prior to Visit   Medication Sig Dispense Refill   • Acetaminophen (TYLENOL ARTHRITIS PAIN PO) Take 1 tablet by mouth Every 8 (Eight) Hours As Needed.     • aspirin 81 MG EC tablet Take 81 mg by mouth Daily.     • atorvastatin (LIPITOR) 80 MG tablet TAKE 1 TABLET BY MOUTH DAILY. 30 tablet 3   • Biotin 1000 MCG tablet Take 1 tablet by mouth daily.     • BLACK COHOSH PO Take 1,000 mg by mouth daily.     • CloNIDine (CATAPRES) 0.1 MG tablet Take 1 tablet by mouth Every 12 (Twelve) Hours. 180 tablet 3   • clopidogrel (PLAVIX) 75 MG tablet TAKE 1 TABLET BY MOUTH DAILY. 90 tablet 3   • cyclobenzaprine (FLEXERIL) 10 MG tablet  Take 1 tablet by mouth 3 (three) times a day.     • docusate sodium (COLACE) 100 MG capsule Take 100 mg by mouth As Needed.  5   • escitalopram (LEXAPRO) 10 MG tablet Take 10 mg by mouth Daily.  0   • ezetimibe (ZETIA) 10 MG tablet Take 1 tablet by mouth Daily. 90 tablet 3   • fluticasone (FLONASE) 50 MCG/ACT nasal spray   3   • furosemide (LASIX) 20 MG tablet TAKE 1 TABLET BY MOUTH DAILY. 90 tablet 3   • gabapentin (NEURONTIN) 400 MG capsule Take 400 mg by mouth 3 (Three) Times a Day.     • hydroCHLOROthiazide (HYDRODIURIL) 12.5 MG tablet TAKE 1 TABLET BY MOUTH DAILY. 90 tablet 3   • isosorbide mononitrate (IMDUR) 30 MG 24 hr tablet TAKE 1 TABLET BY MOUTH DAILY. 90 tablet 3   • losartan (COZAAR) 100 MG tablet TAKE 1 TABLET BY MOUTH DAILY. 90 tablet 3   • metoprolol tartrate 75 MG tablet Take 75 mg by mouth Every 12 (Twelve) Hours. 270 tablet 1   • midodrine (PROAMATINE) 2.5 MG tablet TAKE 1 TABLET BY MOUTH 3 (THREE) TIMES A DAY. 270 tablet 3   • nitroglycerin (NITROSTAT) 0.4 MG SL tablet PLACE 1 TABLET UNDER THE TONGUE EVERY 5 (FIVE) MINUTES AS NEEDED FOR CHEST PAIN. ONLY TAKE UP TO 3 TABLETS. CALL 911 IF PAIN PERSISTS. 25 tablet 5   • oxyCODONE-acetaminophen (PERCOCET)  MG per tablet Take 1 tablet by mouth every 8 (eight) hours as needed for moderate pain (4-6).     • pantoprazole (PROTONIX) 40 MG EC tablet Take 1 tablet by mouth Daily. 90 tablet 3   • potassium chloride (K-DUR,KLOR-CON) 20 MEQ CR tablet TAKE 1 CAPSULE BY MOUTH DAILY. 90 tablet 3   • Umeclidinium Bromide 62.5 MCG/INH aerosol powder  Inhale 1 puff Daily. 90 each 3   • vitamin D (ERGOCALCIFEROL) 45641 UNITS capsule capsule 1 (One) Time Per Week.     • potassium chloride 10 MEQ CR tablet TAKE 2 TABLETS EVERY  tablet 3   • [DISCONTINUED] atorvastatin (LIPITOR) 80 MG tablet Take 1 tablet by mouth Daily. 90 tablet 3   • [DISCONTINUED] furosemide (LASIX) 20 MG tablet Take 1 tablet by mouth Daily. 90 tablet 3   • [DISCONTINUED]  hydroCHLOROthiazide (HYDRODIURIL) 12.5 MG tablet Take 1 tablet by mouth Daily. 90 tablet 3   • [DISCONTINUED] isosorbide mononitrate (IMDUR) 30 MG 24 hr tablet Take 1 tablet by mouth Daily. 90 tablet 3   • [DISCONTINUED] losartan (COZAAR) 100 MG tablet Take 1 tablet by mouth Daily. 90 tablet 3   • [DISCONTINUED] midodrine (PROAMATINE) 2.5 MG tablet TAKE 1 TABLET BY MOUTH 3 (THREE) TIMES A DAY. 270 tablet 3     No current facility-administered medications on file prior to visit.        ALLERGIES    Lisinopril, Adhesive tape, Ibuprofen, and Latex    Past Medical History:   Diagnosis Date   • Anxiety    • Back pain    • Carotid bruit    • Chest pain    • COPD (chronic obstructive pulmonary disease) (CMS/HCC)    • Dyslipidemia    • Fainting    • Functional murmur    • GERD (gastroesophageal reflux disease)    • Hypercholesterolemia    • Hyperlipidemia    • Hypertension    • Hypokalemia    • Orthostatic hypotension    • Orthostatic hypotension    • Peripheral vascular disease (CMS/[facility])    • Peripheral vascular disease (CMS/[facility])     PVD with history of aortobifemoral bypass   • Shortness of breath    • Sleep apnea        Social History     Socioeconomic History   • Marital status:      Spouse name: Not on file   • Number of children: Not on file   • Years of education: Not on file   • Highest education level: Not on file   Tobacco Use   • Smoking status: Current Every Day Smoker     Packs/day: 0.50     Years: 40.00     Pack years: 20.00     Types: Cigarettes   • Smokeless tobacco: Never Used   • Tobacco comment: Pt states she stopped smoking 3/14/19   Substance and Sexual Activity   • Alcohol use: No   • Drug use: Never   • Sexual activity: Defer       Family History   Problem Relation Age of Onset   • Heart failure Mother         Congestive   • Hypertension Mother    • Heart failure Sister         Congestive   • Coronary artery disease Sister    • Heart attack Sister         Acute   • Hypertension Sister    •  Sudden death Sister    • Coronary artery disease Brother    • Seizures Brother    • Hypertension Brother    • Heart failure Maternal Grandmother         Congestive       Review of Systems   Constitutional: Positive for diaphoresis (with CP ) and fatigue (since CP ). Negative for appetite change, chills and fever.   HENT: Positive for congestion (chest ). Negative for rhinorrhea and sore throat.    Eyes: Positive for visual disturbance (glasses).   Respiratory: Positive for cough (phlem ), chest tightness (center of the chest ) and shortness of breath (with CP and depending on what she is doing, no change there ). Negative for wheezing.    Cardiovascular: Positive for chest pain (midsternum pressure with pins, started 2-3 weeks ago; woke up 2 nights/nitro sometiems more than one; Thurs no nitro; rad to right arm to elbow ), palpitations (fluttering and beating really fast; when she first lays down; once O2 kicks in then slows down; feels like she has run a marathon ) and leg swelling (both feet; uses water pill when they swell ).   Gastrointestinal: Positive for nausea (with CP ). Negative for abdominal pain, blood in stool, constipation, diarrhea and vomiting.   Endocrine: Positive for cold intolerance (feet ). Negative for heat intolerance.   Genitourinary: Negative for difficulty urinating, dysuria, frequency, hematuria and urgency.   Musculoskeletal: Positive for arthralgias, back pain (lower back ), joint swelling (hands and feet ) and neck pain (center of her neck ). Negative for neck stiffness.   Skin: Negative for rash and wound.   Allergic/Immunologic: Negative for environmental allergies and food allergies.   Neurological: Positive for dizziness (when she was getting her hair shampoo), weakness (right elbow aches; since CP ) and light-headedness (when she was getting her hair shampoo; with CP ). Negative for headaches.   Hematological: Bruises/bleeds easily (both ).   Psychiatric/Behavioral: Positive for  "sleep disturbance (sleep with o2 ).       Objective   /91   Pulse 91   Temp 98.6 °F (37 °C)   Ht 156.2 cm (61.5\")   Wt 87.5 kg (193 lb)   SpO2 94%   BMI 35.88 kg/m²   Vitals:    03/01/21 1405   BP: 165/91   Pulse: 91   Temp: 98.6 °F (37 °C)   SpO2: 94%   Weight: 87.5 kg (193 lb)   Height: 156.2 cm (61.5\")      Lab Results (most recent)     None        Physical Exam  Vitals signs reviewed.   Constitutional:       General: She is awake.      Appearance: Normal appearance. She is well-developed and well-groomed. She is obese.   HENT:      Head: Normocephalic.   Eyes:      General: Lids are normal.      Comments: Wears glasses    Neck:      Vascular: No carotid bruit, hepatojugular reflux or JVD.   Cardiovascular:      Rate and Rhythm: Normal rate and regular rhythm.      Pulses:           Radial pulses are 2+ on the right side and 2+ on the left side.        Dorsalis pedis pulses are 2+ on the right side and 2+ on the left side.        Posterior tibial pulses are 2+ on the right side and 2+ on the left side.      Heart sounds: Normal heart sounds.   Pulmonary:      Effort: Pulmonary effort is normal.      Breath sounds: Normal breath sounds and air entry.   Abdominal:      General: Bowel sounds are normal.      Palpations: Abdomen is soft.   Musculoskeletal:      Right lower leg: No edema.      Left lower leg: No edema.   Skin:     General: Skin is warm and dry.   Neurological:      Mental Status: She is alert and oriented to person, place, and time.   Psychiatric:         Attention and Perception: Attention and perception normal.         Mood and Affect: Mood and affect normal.         Speech: Speech normal.         Behavior: Behavior normal. Behavior is cooperative.         Thought Content: Thought content normal.         Cognition and Memory: Cognition and memory normal.         Judgment: Judgment normal.         Procedure     ECG 12 Lead    Date/Time: 3/1/2021 2:20 PM  Performed by: Felicita Robledo, " OSCAR  Authorized by: Felicita Robledo APRN   Comparison: compared with previous ECG from 2/4/2020  Comparison to previous ECG: Non specific t wave changes today   Rhythm: sinus rhythm  Rate: normal  BPM: 88  QRS axis: normal  Other findings: low voltage and T wave abnormality    Clinical impression: non-specific ECG                 Assessment/Plan      Diagnosis Plan   1. Chest pain, precordial  ECG 12 Lead    Stress Test With Myocardial Perfusion One Day    Adult Transthoracic Echo Complete W/ Cont if Necessary Per Protocol    D-dimer, Quantitative   2. CAD in native artery  ECG 12 Lead    Stress Test With Myocardial Perfusion One Day    Adult Transthoracic Echo Complete W/ Cont if Necessary Per Protocol    Comprehensive Metabolic Panel   3. Elevated cholesterol  Stress Test With Myocardial Perfusion One Day    Lipid Panel   4. Orthostatic hypotension     5. Peripheral vascular disease (CMS/HCC)  Lipid Panel   6. Stenosis of left vertebral artery     7. Shortness of breath  Stress Test With Myocardial Perfusion One Day    Adult Transthoracic Echo Complete W/ Cont if Necessary Per Protocol    D-dimer, Quantitative   8. Morbidly obese (CMS/HCC)  Stress Test With Myocardial Perfusion One Day   9. Bilateral carotid artery stenosis  Duplex Carotid Ultrasound CAR    Lipid Panel   10. Palpitations  ECG 12 Lead    Stress Test With Myocardial Perfusion One Day    Adult Transthoracic Echo Complete W/ Cont if Necessary Per Protocol    TSH    T3, Free    T4, Free    Magnesium    Cardiac Event Monitor   11. Peripheral edema  Adult Transthoracic Echo Complete W/ Cont if Necessary Per Protocol   12. Grade I diastolic dysfunction  Adult Transthoracic Echo Complete W/ Cont if Necessary Per Protocol   13. Healthcare maintenance  Lipid Panel    Comprehensive Metabolic Panel    TSH    T3, Free    T4, Free    Magnesium   14. Dizziness  Duplex Carotid Ultrasound CAR    Cardiac Event Monitor       Return keep follow-up.    Chest  pain/CAD/hyperlipidemia/shortness of breath/palpitations-patient will have an ischemia work-up, stress and echo.  She will continue to use nitro as needed for chest pain no resolution she will go to the ER.  She will continue her medication regimen otherwise.  Dizziness-patient will have carotid artery ultrasound.  Palpitations/dizziness-patient will wear an event monitor for 2 weeks.  She will get a D-dimer, CMP, lipid, TSH, free T3, free T4 and magnesium today.  She will follow-up as scheduled or sooner if any changes or abnormalities with testing.  Patient is going to monitor her blood pressure.  If her blood pressure is staying elevated she will contact our office and we will make changes.       Barby Sue  reports that she has been smoking cigarettes. She has a 20.00 pack-year smoking history. She has never used smokeless tobacco.. I have educated her on the risk of diseases from using tobacco products such as cancer, COPD and heart disease.     I advised her to quit and she is not willing to quit.    I spent 3  minutes counseling the patient.         Patient's Body mass index is 35.88 kg/m². BMI is above normal parameters. Recommendations include: educational material.      Electronically signed by:

## 2021-03-02 ENCOUNTER — TELEPHONE (OUTPATIENT)
Dept: CARDIOLOGY | Facility: CLINIC | Age: 66
End: 2021-03-02

## 2021-03-02 DIAGNOSIS — R79.89 POSITIVE D DIMER: Primary | ICD-10-CM

## 2021-03-02 DIAGNOSIS — R07.89 OTHER CHEST PAIN: ICD-10-CM

## 2021-03-02 DIAGNOSIS — R06.02 SHORTNESS OF BREATH: ICD-10-CM

## 2021-03-02 LAB — T3FREE SERPL-MCNC: 3.2 PG/ML (ref 2–4.4)

## 2021-03-02 NOTE — TELEPHONE ENCOUNTER
----- Message from OSCAR Hutson sent at 3/2/2021  6:38 AM EST -----  Patient needs CTA chest set up for today,Cr is on chart.  I have already put order in.      Rosana if you will advise patient of labs.  Uma/Uma if you can get approved and scheduled.  Uma PARKER please go ahead and call patient with date and time that way if rosana is busy patient is aware, rosana can call with other lab results later.  Thanks!

## 2021-03-02 NOTE — TELEPHONE ENCOUNTER
Called and advised pt of her lab results :   T3, Free   2.00 - 4.40 pg/mL 3.20      Creatinine   0.57 - 1.00 mg/dL 0.84      Sodium   136 - 145 mmol/L 140      Potassium   3.5 - 5.2 mmol/L 3.8      Magnesium   1.6 - 2.4 mg/dL 2.1      D-Dimer, Quantitative   0.00 - 0.50 MCGFEU/mL 0.92High Critical       Total Cholesterol   0 - 200 mg/dL 134      Triglycerides   0 - 150 mg/dL 93      HDL Cholesterol   40 - 60 mg/dL 32Low      LDL Cholesterol    0 - 100 mg/dL 84      Free T4   0.93 - 1.70 ng/dL 1.14

## 2021-03-09 ENCOUNTER — TELEPHONE (OUTPATIENT)
Dept: CARDIOLOGY | Facility: CLINIC | Age: 66
End: 2021-03-09

## 2021-03-09 NOTE — TELEPHONE ENCOUNTER
----- Message from OSCAR Hutson sent at 3/9/2021  6:25 AM EST -----  Advise patient no blood clot, however, emphysema and some enlarged lymph nodes.  She sees Dr. Garcia, make sure she follows-up with her re:lymph nodes.  I have forwarded the CT.      Left mess for Pt to return call regarding test results :    Pt called back regarding her test results pt was advised of test results pt stated that she was calling Dr Garcia to get her apt

## 2021-04-12 ENCOUNTER — HOSPITAL ENCOUNTER (OUTPATIENT)
Dept: CARDIOLOGY | Facility: HOSPITAL | Age: 66
Discharge: HOME | End: 2021-04-12

## 2021-04-12 VITALS — BODY MASS INDEX: 36.42 KG/M2 | WEIGHT: 192.9 LBS | HEIGHT: 61 IN

## 2021-04-12 DIAGNOSIS — E66.01 MORBIDLY OBESE (HCC): ICD-10-CM

## 2021-04-12 DIAGNOSIS — I51.89 GRADE I DIASTOLIC DYSFUNCTION: ICD-10-CM

## 2021-04-12 DIAGNOSIS — R06.02 SHORTNESS OF BREATH: ICD-10-CM

## 2021-04-12 DIAGNOSIS — R00.2 PALPITATIONS: ICD-10-CM

## 2021-04-12 DIAGNOSIS — I25.10 CAD IN NATIVE ARTERY: ICD-10-CM

## 2021-04-12 DIAGNOSIS — E78.00 ELEVATED CHOLESTEROL: ICD-10-CM

## 2021-04-12 DIAGNOSIS — R07.2 CHEST PAIN, PRECORDIAL: ICD-10-CM

## 2021-04-12 DIAGNOSIS — I65.23 BILATERAL CAROTID ARTERY STENOSIS: ICD-10-CM

## 2021-04-12 DIAGNOSIS — R60.9 PERIPHERAL EDEMA: ICD-10-CM

## 2021-04-12 DIAGNOSIS — R42 DIZZINESS: ICD-10-CM

## 2021-04-12 PROCEDURE — 25010000002 REGADENOSON 0.4 MG/5ML SOLUTION: Performed by: INTERNAL MEDICINE

## 2021-04-12 PROCEDURE — 93880 EXTRACRANIAL BILAT STUDY: CPT

## 2021-04-12 PROCEDURE — A9500 TC99M SESTAMIBI: HCPCS | Performed by: INTERNAL MEDICINE

## 2021-04-12 PROCEDURE — 78452 HT MUSCLE IMAGE SPECT MULT: CPT | Performed by: INTERNAL MEDICINE

## 2021-04-12 PROCEDURE — 93016 CV STRESS TEST SUPVJ ONLY: CPT | Performed by: NURSE PRACTITIONER

## 2021-04-12 PROCEDURE — 93880 EXTRACRANIAL BILAT STUDY: CPT | Performed by: INTERNAL MEDICINE

## 2021-04-12 PROCEDURE — 0 TECHNETIUM SESTAMIBI: Performed by: INTERNAL MEDICINE

## 2021-04-12 PROCEDURE — 93306 TTE W/DOPPLER COMPLETE: CPT

## 2021-04-12 PROCEDURE — 93018 CV STRESS TEST I&R ONLY: CPT | Performed by: INTERNAL MEDICINE

## 2021-04-12 PROCEDURE — 93306 TTE W/DOPPLER COMPLETE: CPT | Performed by: INTERNAL MEDICINE

## 2021-04-12 PROCEDURE — 78452 HT MUSCLE IMAGE SPECT MULT: CPT

## 2021-04-12 PROCEDURE — 93017 CV STRESS TEST TRACING ONLY: CPT

## 2021-04-12 RX ADMIN — TECHNETIUM TC 99M SESTAMIBI 1 DOSE: 1 INJECTION INTRAVENOUS at 11:37

## 2021-04-12 RX ADMIN — TECHNETIUM TC 99M SESTAMIBI 1 DOSE: 1 INJECTION INTRAVENOUS at 09:52

## 2021-04-12 RX ADMIN — REGADENOSON 0.4 MG: 0.08 INJECTION, SOLUTION INTRAVENOUS at 11:36

## 2021-04-13 LAB
BH CV REST NUCLEAR ISOTOPE DOSE: 10 MCI
BH CV STRESS COMMENTS STAGE 1: NORMAL
BH CV STRESS DOSE REGADENOSON STAGE 1: 0.4
BH CV STRESS DURATION MIN STAGE 1: 0
BH CV STRESS DURATION SEC STAGE 1: 10
BH CV STRESS NUCLEAR ISOTOPE DOSE: 30 MCI
BH CV STRESS PROTOCOL 1: NORMAL
BH CV STRESS RECOVERY BP: NORMAL MMHG
BH CV STRESS RECOVERY HR: 90 BPM
BH CV STRESS STAGE 1: 1
MAXIMAL PREDICTED HEART RATE: 156 BPM
PERCENT MAX PREDICTED HR: 61.54 %
STRESS BASELINE BP: NORMAL MMHG
STRESS BASELINE HR: 78 BPM
STRESS PERCENT HR: 72 %
STRESS POST PEAK BP: NORMAL MMHG
STRESS POST PEAK HR: 96 BPM
STRESS TARGET HR: 133 BPM

## 2021-04-14 ENCOUNTER — TELEPHONE (OUTPATIENT)
Dept: CARDIOLOGY | Facility: CLINIC | Age: 66
End: 2021-04-14

## 2021-04-14 NOTE — TELEPHONE ENCOUNTER
Stress:  1.  No scintigraphic evidence of ischemia.     2.  Preserved post-rest ejection fraction of 65% with no focal wall motion abnormalities.     3.  Mildly elevated transient ischemic dilation ratio 1.23 of unknown significance.  Normal lung heart radiopharmaceutical ratio at 0.39.      Have not received echo results yet, will call back once available.       First attempt to reach pt. Left a voicemail for pt to return my call at [phone].

## 2021-04-20 ENCOUNTER — TELEPHONE (OUTPATIENT)
Dept: CARDIOLOGY | Facility: CLINIC | Age: 66
End: 2021-04-20

## 2021-04-20 LAB
AORTIC DIMENSIONLESS INDEX: 0.9 (DI)
BH CV ECHO MEAS - ACS: 1.4 CM
BH CV ECHO MEAS - AO MAX PG (FULL): 0.76 MMHG
BH CV ECHO MEAS - AO MAX PG: 6 MMHG
BH CV ECHO MEAS - AO MEAN PG (FULL): 1 MMHG
BH CV ECHO MEAS - AO MEAN PG: 3 MMHG
BH CV ECHO MEAS - AO ROOT AREA (BSA CORRECTED): 1.5
BH CV ECHO MEAS - AO ROOT AREA: 6.6 CM^2
BH CV ECHO MEAS - AO ROOT DIAM: 2.9 CM
BH CV ECHO MEAS - AO V2 MAX: 122 CM/SEC
BH CV ECHO MEAS - AO V2 MEAN: 77.2 CM/SEC
BH CV ECHO MEAS - AO V2 VTI: 24 CM
BH CV ECHO MEAS - BSA(HAYCOCK): 2 M^2
BH CV ECHO MEAS - BSA(HAYCOCK): 2 M^2
BH CV ECHO MEAS - BSA: 1.9 M^2
BH CV ECHO MEAS - BSA: 1.9 M^2
BH CV ECHO MEAS - BZI_BMI: 35.3 KILOGRAMS/M^2
BH CV ECHO MEAS - BZI_BMI: 36.3 KILOGRAMS/M^2
BH CV ECHO MEAS - BZI_METRIC_HEIGHT: 154.9 CM
BH CV ECHO MEAS - BZI_METRIC_HEIGHT: 157.5 CM
BH CV ECHO MEAS - BZI_METRIC_WEIGHT: 87.1 KG
BH CV ECHO MEAS - BZI_METRIC_WEIGHT: 87.5 KG
BH CV ECHO MEAS - EDV(CUBED): 63.5 ML
BH CV ECHO MEAS - EDV(TEICH): 69.6 ML
BH CV ECHO MEAS - EF(CUBED): 72.3 %
BH CV ECHO MEAS - EF(TEICH): 64.6 %
BH CV ECHO MEAS - EF_3D-VOL: 50 %
BH CV ECHO MEAS - ESV(CUBED): 17.6 ML
BH CV ECHO MEAS - ESV(TEICH): 24.6 ML
BH CV ECHO MEAS - FS: 34.8 %
BH CV ECHO MEAS - IVS/LVPW: 0.95
BH CV ECHO MEAS - IVSD: 0.91 CM
BH CV ECHO MEAS - LA DIMENSION(2D): 3.2 CM
BH CV ECHO MEAS - LA DIMENSION: 3.3 CM
BH CV ECHO MEAS - LA/AO: 1.1
BH CV ECHO MEAS - LAT PEAK E' VEL: 5.5 CM/SEC
BH CV ECHO MEAS - LV IVRT: 0.12 SEC
BH CV ECHO MEAS - LV MASS(C)D: 115.3 GRAMS
BH CV ECHO MEAS - LV MASS(C)DI: 61.2 GRAMS/M^2
BH CV ECHO MEAS - LV MAX PG: 5.2 MMHG
BH CV ECHO MEAS - LV MEAN PG: 2 MMHG
BH CV ECHO MEAS - LV V1 MAX: 114 CM/SEC
BH CV ECHO MEAS - LV V1 MEAN: 67 CM/SEC
BH CV ECHO MEAS - LV V1 VTI: 23.6 CM
BH CV ECHO MEAS - LVIDD: 4 CM
BH CV ECHO MEAS - LVIDS: 2.6 CM
BH CV ECHO MEAS - LVPWD: 0.96 CM
BH CV ECHO MEAS - MED PEAK E' VEL: 5.4 CM/SEC
BH CV ECHO MEAS - MV A MAX VEL: 81.1 CM/SEC
BH CV ECHO MEAS - MV DEC SLOPE: 483 CM/SEC^2
BH CV ECHO MEAS - MV DEC TIME: 0.21 SEC
BH CV ECHO MEAS - MV E MAX VEL: 57 CM/SEC
BH CV ECHO MEAS - MV E/A: 0.7
BH CV ECHO MEAS - MV MAX PG: 4.2 MMHG
BH CV ECHO MEAS - MV MEAN PG: 2 MMHG
BH CV ECHO MEAS - MV P1/2T MAX VEL: 95.6 CM/SEC
BH CV ECHO MEAS - MV P1/2T: 58 MSEC
BH CV ECHO MEAS - MV V2 MAX: 102 CM/SEC
BH CV ECHO MEAS - MV V2 MEAN: 57.2 CM/SEC
BH CV ECHO MEAS - MV V2 VTI: 25.8 CM
BH CV ECHO MEAS - MVA P1/2T LCG: 2.3 CM^2
BH CV ECHO MEAS - MVA(P1/2T): 3.8 CM^2
BH CV ECHO MEAS - PA MAX PG (FULL): 0.85 MMHG
BH CV ECHO MEAS - PA MAX PG: 3.2 MMHG
BH CV ECHO MEAS - PA MEAN PG (FULL): 0.5 MMHG
BH CV ECHO MEAS - PA MEAN PG: 1.5 MMHG
BH CV ECHO MEAS - PA V2 MAX: 88.8 CM/SEC
BH CV ECHO MEAS - PA V2 MEAN: 59.6 CM/SEC
BH CV ECHO MEAS - PA V2 VTI: 20.6 CM
BH CV ECHO MEAS - RV MAX PG: 2.4 MMHG
BH CV ECHO MEAS - RV MEAN PG: 1 MMHG
BH CV ECHO MEAS - RV V1 MAX: 76.7 CM/SEC
BH CV ECHO MEAS - RV V1 MEAN: 47.4 CM/SEC
BH CV ECHO MEAS - RV V1 VTI: 18.1 CM
BH CV ECHO MEAS - RVDD: 2.4 CM
BH CV ECHO MEAS - SI(AO): 84.2 ML/M^2
BH CV ECHO MEAS - SI(CUBED): 24.4 ML/M^2
BH CV ECHO MEAS - SI(TEICH): 23.9 ML/M^2
BH CV ECHO MEAS - SV(AO): 158.5 ML
BH CV ECHO MEAS - SV(CUBED): 45.9 ML
BH CV ECHO MEAS - SV(TEICH): 45 ML
BH CV ECHO MEASUREMENTS AVERAGE E/E' RATIO: 10.46
BH CV XLRA MEAS LEFT BULB EDV: -21.8 CM/SEC
BH CV XLRA MEAS LEFT BULB PSV: -64.2 CM/SEC
BH CV XLRA MEAS LEFT CCA RATIO VEL: 77.7 CM/SEC
BH CV XLRA MEAS LEFT DIST CCA EDV: 33.2 CM/SEC
BH CV XLRA MEAS LEFT DIST CCA PSV: 78.1 CM/SEC
BH CV XLRA MEAS LEFT DIST ICA EDV: -77.7 CM/SEC
BH CV XLRA MEAS LEFT DIST ICA PSV: -200.8 CM/SEC
BH CV XLRA MEAS LEFT ICA RATIO VEL: -200 CM/SEC
BH CV XLRA MEAS LEFT ICA/CCA RATIO: -2.6
BH CV XLRA MEAS LEFT MID ICA EDV: -65.5 CM/SEC
BH CV XLRA MEAS LEFT MID ICA PSV: -188.6 CM/SEC
BH CV XLRA MEAS LEFT PROX CCA EDV: 20.4 CM/SEC
BH CV XLRA MEAS LEFT PROX CCA PSV: 67.6 CM/SEC
BH CV XLRA MEAS LEFT PROX ECA EDV: 8.7 CM/SEC
BH CV XLRA MEAS LEFT PROX ECA PSV: 44 CM/SEC
BH CV XLRA MEAS LEFT PROX ICA EDV: -41.5 CM/SEC
BH CV XLRA MEAS LEFT PROX ICA PSV: -107.5 CM/SEC
BH CV XLRA MEAS LEFT VERTEBRAL A EDV: -18.2 CM/SEC
BH CV XLRA MEAS LEFT VERTEBRAL A PSV: -48.5 CM/SEC
BH CV XLRA MEAS RIGHT BULB EDV: -31.4 CM/SEC
BH CV XLRA MEAS RIGHT BULB PSV: -72.7 CM/SEC
BH CV XLRA MEAS RIGHT CCA RATIO VEL: -62.9 CM/SEC
BH CV XLRA MEAS RIGHT DIST CCA EDV: -25.5 CM/SEC
BH CV XLRA MEAS RIGHT DIST CCA PSV: -63.3 CM/SEC
BH CV XLRA MEAS RIGHT DIST ICA EDV: -51.8 CM/SEC
BH CV XLRA MEAS RIGHT DIST ICA PSV: -131.2 CM/SEC
BH CV XLRA MEAS RIGHT ICA RATIO VEL: -131 CM/SEC
BH CV XLRA MEAS RIGHT ICA/CCA RATIO: 2.1
BH CV XLRA MEAS RIGHT MID ICA EDV: 35.3 CM/SEC
BH CV XLRA MEAS RIGHT MID ICA PSV: 108.6 CM/SEC
BH CV XLRA MEAS RIGHT PROX CCA EDV: 18.2 CM/SEC
BH CV XLRA MEAS RIGHT PROX CCA PSV: 75.6 CM/SEC
BH CV XLRA MEAS RIGHT PROX ECA EDV: -49.1 CM/SEC
BH CV XLRA MEAS RIGHT PROX ECA PSV: -353.6 CM/SEC
BH CV XLRA MEAS RIGHT PROX ICA EDV: -42.7 CM/SEC
BH CV XLRA MEAS RIGHT PROX ICA PSV: -99.7 CM/SEC
BH CV XLRA MEAS RIGHT VERTEBRAL A EDV: 23.7 CM/SEC
BH CV XLRA MEAS RIGHT VERTEBRAL A PSV: 55.7 CM/SEC
MAXIMAL PREDICTED HEART RATE: 156 BPM
STRESS TARGET HR: 133 BPM

## 2021-04-20 NOTE — TELEPHONE ENCOUNTER
Pt returned call on the Felicita line:  Informed pt of her results and message from Felicita, she verbalized understanding.

## 2021-04-20 NOTE — TELEPHONE ENCOUNTER
Duplex Carotid:  Summary: Moderate carotid disease bilaterally as above.  Antegrade flow both vertebral arteries.      Echo:  Left ventricular ejection fraction appears to be 56 - 60%.       First attempt to reach pt. Left a voicemail for pt to return my call at [phone].

## 2021-04-20 NOTE — TELEPHONE ENCOUNTER
----- Message from OSCAR Hutson sent at 4/20/2021  7:37 AM EDT -----  Please advise patient.  She will need repeat in 6 mos - 1 yr.  She is on asa and statin.

## 2021-06-15 ENCOUNTER — OFFICE VISIT (OUTPATIENT)
Dept: CARDIOLOGY | Facility: CLINIC | Age: 66
End: 2021-06-15

## 2021-06-15 VITALS
HEIGHT: 62 IN | HEART RATE: 91 BPM | TEMPERATURE: 96.3 F | SYSTOLIC BLOOD PRESSURE: 154 MMHG | OXYGEN SATURATION: 96 % | DIASTOLIC BLOOD PRESSURE: 80 MMHG | WEIGHT: 189 LBS | BODY MASS INDEX: 34.78 KG/M2

## 2021-06-15 DIAGNOSIS — I10 ESSENTIAL HYPERTENSION: ICD-10-CM

## 2021-06-15 DIAGNOSIS — I25.10 CAD IN NATIVE ARTERY: Primary | ICD-10-CM

## 2021-06-15 DIAGNOSIS — R60.9 PERIPHERAL EDEMA: ICD-10-CM

## 2021-06-15 DIAGNOSIS — R00.2 PALPITATIONS: ICD-10-CM

## 2021-06-15 DIAGNOSIS — E78.5 DYSLIPIDEMIA: ICD-10-CM

## 2021-06-15 DIAGNOSIS — I65.23 BILATERAL CAROTID ARTERY STENOSIS: ICD-10-CM

## 2021-06-15 DIAGNOSIS — R06.02 SHORTNESS OF BREATH: ICD-10-CM

## 2021-06-15 DIAGNOSIS — I95.1 ORTHOSTATIC HYPOTENSION: ICD-10-CM

## 2021-06-15 DIAGNOSIS — I51.89 GRADE I DIASTOLIC DYSFUNCTION: ICD-10-CM

## 2021-06-15 DIAGNOSIS — I95.1 HYPOTENSION, POSTURAL: ICD-10-CM

## 2021-06-15 DIAGNOSIS — I65.02 STENOSIS OF LEFT VERTEBRAL ARTERY: ICD-10-CM

## 2021-06-15 DIAGNOSIS — I73.9 PERIPHERAL VASCULAR DISEASE (HCC): ICD-10-CM

## 2021-06-15 PROCEDURE — 99214 OFFICE O/P EST MOD 30 MIN: CPT | Performed by: NURSE PRACTITIONER

## 2021-06-15 RX ORDER — MIDODRINE HYDROCHLORIDE 2.5 MG/1
2.5 TABLET ORAL
Qty: 180 TABLET | Refills: 3 | Status: SHIPPED | OUTPATIENT
Start: 2021-06-15 | End: 2022-04-22

## 2021-06-15 NOTE — PROGRESS NOTES
Subjective   Barby Sue is a 66 y.o. female     Chief Complaint   Patient presents with   • Follow-up   • Chest Pain       HPI    Problem List:    1. Coronary artery disease  1.1 left heart cath 3/20/12-20% right coronary artery, circumflex 50-70% proximal, 20-30% first diagonal, EF 60%  1.2 Follow up stress August 2015 demonstrated chest wall attenuation but no sign of ischemia, preserved ER  1.3 Stress Test 12/14/16 - no ischemia; low risk  1.4 stress test 3/20/19-possible inferior basilar ischemia, preserved post stress EF of 83%, increased transient ischemic dilation ratio of 0.43  1.5 [facility] 4/22/19 -stent proximal circumflex, EF 55 to 60%, LVEDP 16-18, no aortic valve gradient present on catheter pullback  1.6 stress test 4/12/2021-no evidence of ischemia, post-rest EF 65%, mildly elevated transischemic dilation ratio of unknown significance  2. PVD with history of aortobifemoral bypass 2014  2.1 BLE Arterial U/S 12/14/16 - < or equal to 50% right common femoral artery; high-grade stenosis left posterior tibial artery 50-75% stenosis  2.2 CTA Abd with runoff 5/16/17 - patent aorta bi-iliac graft, mild narrowing seen at prox aspect of the celiac trunk.  No high grade stenosis. Fatty infiltration of the liver.   3. Chronic hypotension   4. Hyperlipidemia   5. Carotid Artery Disease   5.1 CTA head 2/15/19 - left external carotid artery is occluded, no sig stenosis KENNEDY; left vertebral artery at arch is near occluded; enlarged precarinal node 1.4 x 1.5 and right of the midline measuring 1.1 x 1.6, emphysema, per patient Neuroradiologist stated not blocked as indicated in CT  5.2 carotid artery ultrasound 4/12/2021-due to 69% stenosis by Doppler in the distal right internal carotid artery and mid and distal left internal carotid artery, antegrade flow both vertebral arteries  6. Echo 12/14/16 - mild LVH; EF > 65%; DD I; trace MR; physiological TR; mild [place]  6.1 echo 3/20/19-mild LVH, EF 61-65%, diastolic dysfunction  1  6.2 Echo 4/12/2021-EF 55 to 60%, diastolic dysfunction 1, trivial AI and trivial MR  7. COPD/Emphysema, Dr. Garcia using O2 2L NC @ HS    8. Palpitations  8.1 Event Monitor 3/31-4/11/17 - NSR - ST  8.2 event monitor 3/10-3/23/2021-normal sinus rhythm    Patient is a 66-year-old female presents today for follow-up on testing with her daughter and granddaughter at her side.  She denies any chest pain or pressure.  She says she does have palpitations when she lays down at night and she does exert herself but she becomes very short of breath first.  She denies any dizziness, presyncope, syncope, orthopnea or PND.  She says she has some swelling but uses her water pill daily.  She says she is short of breath all the time no matter how long the distance is.  She says it is worse if she walks longer distance.  She does see Dr. Garcia and she will be seeing her tomorrow.  Dr. Garcia did repeat the CT which I looked at the hospital with but was unable to print and it did indicate that it appears to be benign large lymph nodes.  Patient still smokes half a pack a day.    We went over stress, echo, event, carotid and CT of the chest.      Current Outpatient Medications on File Prior to Visit   Medication Sig Dispense Refill   • Acetaminophen (TYLENOL ARTHRITIS PAIN PO) Take 1 tablet by mouth Every 8 (Eight) Hours As Needed.     • aspirin 81 MG EC tablet Take 81 mg by mouth Daily.     • atorvastatin (LIPITOR) 80 MG tablet TAKE 1 TABLET BY MOUTH DAILY. 30 tablet 3   • Biotin 1000 MCG tablet Take 1 tablet by mouth daily.     • BLACK COHOSH PO Take 1,000 mg by mouth daily.     • CloNIDine (CATAPRES) 0.1 MG tablet Take 1 tablet by mouth Every 12 (Twelve) Hours. 180 tablet 3   • clopidogrel (PLAVIX) 75 MG tablet TAKE 1 TABLET BY MOUTH DAILY. 90 tablet 3   • cyclobenzaprine (FLEXERIL) 10 MG tablet Take 1 tablet by mouth 3 (three) times a day.     • docusate sodium (COLACE) 100 MG capsule Take 100 mg by mouth As Needed.  5   •  escitalopram (LEXAPRO) 10 MG tablet Take 10 mg by mouth Daily.  0   • ezetimibe (ZETIA) 10 MG tablet Take 1 tablet by mouth Daily. 90 tablet 3   • fluticasone (FLONASE) 50 MCG/ACT nasal spray   3   • Fluticasone-Umeclidin-Vilant (TRELEGY ELLIPTA IN) Inhale.     • furosemide (LASIX) 20 MG tablet TAKE 1 TABLET BY MOUTH DAILY. 90 tablet 3   • gabapentin (NEURONTIN) 400 MG capsule Take 400 mg by mouth 3 (Three) Times a Day.     • hydroCHLOROthiazide (HYDRODIURIL) 12.5 MG tablet TAKE 1 TABLET BY MOUTH DAILY. 90 tablet 3   • isosorbide mononitrate (IMDUR) 30 MG 24 hr tablet TAKE 1 TABLET BY MOUTH DAILY. 90 tablet 3   • losartan (COZAAR) 100 MG tablet TAKE 1 TABLET BY MOUTH DAILY. 90 tablet 3   • metoprolol tartrate 75 MG tablet Take 75 mg by mouth Every 12 (Twelve) Hours. 270 tablet 1   • nitroglycerin (NITROSTAT) 0.4 MG SL tablet PLACE 1 TABLET UNDER THE TONGUE EVERY 5 (FIVE) MINUTES AS NEEDED FOR CHEST PAIN. ONLY TAKE UP TO 3 TABLETS. CALL 911 IF PAIN PERSISTS. 25 tablet 5   • oxyCODONE-acetaminophen (PERCOCET)  MG per tablet Take 1 tablet by mouth every 8 (eight) hours as needed for moderate pain (4-6).     • pantoprazole (PROTONIX) 40 MG EC tablet Take 1 tablet by mouth Daily. 90 tablet 3   • potassium chloride (K-DUR,KLOR-CON) 20 MEQ CR tablet TAKE 1 CAPSULE BY MOUTH DAILY. 90 tablet 3   • vitamin D (ERGOCALCIFEROL) 77536 UNITS capsule capsule 1 (One) Time Per Week.     • [DISCONTINUED] midodrine (PROAMATINE) 2.5 MG tablet TAKE 1 TABLET BY MOUTH 3 (THREE) TIMES A DAY. 270 tablet 3   • [DISCONTINUED] potassium chloride 10 MEQ CR tablet TAKE 2 TABLETS EVERY  tablet 3   • [DISCONTINUED] Umeclidinium Bromide 62.5 MCG/INH aerosol powder  Inhale 1 puff Daily. 90 each 3     No current facility-administered medications on file prior to visit.       ALLERGIES    Lisinopril, Adhesive tape, Ibuprofen, and Latex    Past Medical History:   Diagnosis Date   • Anxiety    • Back pain    • Carotid bruit    • Chest pain     • COPD (chronic obstructive pulmonary disease) (CMS/[facility])    • COVID-19 vaccine administered 1st - 03/08/2021 2nd - 04/05/2021 - Moderna    • Dyslipidemia    • Fainting    • Functional murmur    • GERD (gastroesophageal reflux disease)    • Hypercholesterolemia    • Hyperlipidemia    • Hypertension    • Hypokalemia    • Orthostatic hypotension    • Orthostatic hypotension    • Peripheral vascular disease (CMS/[facility])    • Peripheral vascular disease (CMS/[facility])     PVD with history of aortobifemoral bypass   • Shortness of breath    • Sleep apnea        Social History     Socioeconomic History   • Marital status:      Spouse name: Not on file   • Number of children: Not on file   • Years of education: Not on file   • Highest education level: Not on file   Tobacco Use   • Smoking status: Current Every Day Smoker     Packs/day: 0.50     Years: 40.00     Pack years: 20.00     Types: Cigarettes   • Smokeless tobacco: Never Used   • Tobacco comment: Pt states she stopped smoking 3/14/19   Substance and Sexual Activity   • Alcohol use: No   • Drug use: Never   • Sexual activity: Defer       Family History   Problem Relation Age of Onset   • Heart failure Mother         Congestive   • Hypertension Mother    • Heart failure Sister         Congestive   • Coronary artery disease Sister    • Heart attack Sister         Acute   • Hypertension Sister    • Sudden death Sister    • Coronary artery disease Brother    • Seizures Brother    • Hypertension Brother    • Heart failure Maternal Grandmother         Congestive       Review of Systems   Constitutional: Negative for appetite change, chills, fatigue and fever.   HENT: Negative for congestion, rhinorrhea and sore throat.    Eyes: Positive for visual disturbance (glasses).   Respiratory: Positive for shortness of breath (all the time worse when walking long distance; anytime she does anything; sees Dr. Garcia tomorrow). Negative for cough and chest tightness.   "  Cardiovascular: Positive for palpitations (pounds of a night when she lays down , heart races when she gets up to do house work; any time she exerts herself) and leg swelling (legs, feet and ankles at times; uses water pill day). Negative for chest pain.   Gastrointestinal: Negative for abdominal pain, blood in stool, constipation, diarrhea, nausea and vomiting.   Endocrine: Negative for cold intolerance and heat intolerance.   Genitourinary: Negative for difficulty urinating, dysuria, frequency, hematuria and urgency.   Musculoskeletal: Positive for arthralgias (hands, knees ), back pain (lower ), joint swelling (nuckles , hands , feet and knees ) and neck pain (back of her neck ). Negative for neck stiffness.   Skin: Negative for color change, pallor, rash and wound.   Allergic/Immunologic: Negative for environmental allergies and food allergies.   Neurological: Positive for weakness (hands and arms ). Negative for dizziness, light-headedness, numbness and headaches.   Hematological: Bruises/bleeds easily (both ).   Psychiatric/Behavioral: Negative for sleep disturbance.       Objective   /80 (BP Location: Left arm)   Pulse 91   Temp 96.3 °F (35.7 °C)   Ht 156.2 cm (61.5\")   Wt 85.7 kg (189 lb)   SpO2 96%   BMI 35.13 kg/m²   Vitals:    06/15/21 1314   BP: 154/80   BP Location: Left arm   Pulse: 91   Temp: 96.3 °F (35.7 °C)   SpO2: 96%   Weight: 85.7 kg (189 lb)   Height: 156.2 cm (61.5\")      Lab Results (most recent)     None        Physical Exam  Vitals reviewed.   Constitutional:       General: She is awake.      Appearance: Normal appearance. She is well-developed and well-groomed. She is obese.   HENT:      Head: Normocephalic.   Eyes:      General: Lids are normal.      Comments: Wears glasses   Neck:      Vascular: No carotid bruit, hepatojugular reflux or JVD.   Cardiovascular:      Rate and Rhythm: Normal rate and regular rhythm.      Pulses:           Radial pulses are 2+ on the right side " and 2+ on the left side.        Dorsalis pedis pulses are 2+ on the right side and 2+ on the left side.        Posterior tibial pulses are 2+ on the right side and 2+ on the left side.      Heart sounds: Normal heart sounds.   Pulmonary:      Breath sounds: Normal air entry. Examination of the right-lower field reveals decreased breath sounds. Examination of the left-lower field reveals decreased breath sounds. Decreased breath sounds present.   Abdominal:      General: Bowel sounds are normal.      Palpations: Abdomen is soft.   Musculoskeletal:      Right lower leg: No edema.      Left lower leg: No edema.   Skin:     General: Skin is warm and dry.   Neurological:      Mental Status: She is alert and oriented to person, place, and time.   Psychiatric:         Attention and Perception: Attention and perception normal.         Mood and Affect: Mood and affect normal.         Speech: Speech normal.         Behavior: Behavior normal. Behavior is cooperative.         Thought Content: Thought content normal.         Cognition and Memory: Cognition and memory normal.         Judgment: Judgment normal.         Procedure   Procedures         Assessment/Plan      Diagnosis Plan   1. CAD in native artery     2. Essential hypertension     3. Bilateral carotid artery stenosis     4. Grade I diastolic dysfunction     5. Dyslipidemia     6. Peripheral vascular disease (CMS/HCC)     7. Stenosis of left vertebral artery     8. Shortness of breath     9. Peripheral edema     10. Palpitations     11. Hypotension, postural  midodrine (PROAMATINE) 2.5 MG tablet   12. Orthostatic hypotension         Return in about 6 months (around 12/15/2021).    CAD- patient is on ASA, beta, plavix and statin.  Hypertension - patient is on HCTZ, losartan and beta.  BP is up, but she is midodrine, will try to decrease dose.  Carotid Artery disease - on ASA and statin.  PVD - on ASA and statin.  Vertebral artery stenosis - asa and statin.  Dyslipidemia  - on zetia and statin.  We went over labs, LDL 84.  Shortness of breath - stable.  Diastolic dysfunction/peripheral edema-patient is on Lasix and hydrochlorothiazide.  Orthostatic hypotension-decreasing her midodrine to only twice a day.  She will continue her medication regimen otherwise.  She will follow-up in 6 months or sooner if any changes.  I advised patient if her dizziness comes back to go back to 3 times a day.       Barby Sue  reports that she has been smoking cigarettes. She has a 20.00 pack-year smoking history. She has never used smokeless tobacco..  Electronically signed by:

## 2021-06-30 DIAGNOSIS — I25.10 CAD IN NATIVE ARTERY: ICD-10-CM

## 2021-06-30 DIAGNOSIS — I73.9 PERIPHERAL VASCULAR DISEASE (HCC): ICD-10-CM

## 2021-06-30 DIAGNOSIS — E78.5 DYSLIPIDEMIA: ICD-10-CM

## 2021-06-30 DIAGNOSIS — I65.02 STENOSIS OF LEFT VERTEBRAL ARTERY: ICD-10-CM

## 2021-06-30 RX ORDER — ATORVASTATIN CALCIUM 80 MG/1
80 TABLET, FILM COATED ORAL DAILY
Qty: 30 TABLET | Refills: 11 | Status: SHIPPED | OUTPATIENT
Start: 2021-06-30 | End: 2022-04-25

## 2021-07-28 DIAGNOSIS — K21.9 GASTROESOPHAGEAL REFLUX DISEASE: ICD-10-CM

## 2021-07-28 DIAGNOSIS — I25.10 CAD IN NATIVE ARTERY: ICD-10-CM

## 2021-07-28 RX ORDER — CLOPIDOGREL BISULFATE 75 MG/1
75 TABLET ORAL DAILY
Qty: 90 TABLET | Refills: 3 | Status: SHIPPED | OUTPATIENT
Start: 2021-07-28 | End: 2022-04-25

## 2021-07-28 RX ORDER — PANTOPRAZOLE SODIUM 40 MG/1
40 TABLET, DELAYED RELEASE ORAL DAILY
Qty: 90 TABLET | Refills: 3 | Status: SHIPPED | OUTPATIENT
Start: 2021-07-28 | End: 2022-04-25

## 2021-08-26 NOTE — PATIENT INSTRUCTIONS
Palpitations  Palpitations are feelings that your heartbeat is irregular or is faster than normal. It may feel like your heart is fluttering or skipping a beat. Palpitations are usually not a serious problem. They may be caused by many things, including smoking, caffeine, alcohol, stress, and certain medicines or drugs. Most causes of palpitations are not serious. However, some palpitations can be a sign of a serious problem. You may need further tests to rule out serious medical problems.  Follow these instructions at home:         Pay attention to any changes in your condition. Take these actions to help manage your symptoms:  Eating and drinking  · Avoid foods and drinks that may cause palpitations. These may include:  ? Caffeinated coffee, tea, soft drinks, diet pills, and energy drinks.  ? Chocolate.  ? Alcohol.  Lifestyle  · Take steps to reduce your stress and anxiety. Things that can help you relax include:  ? Yoga.  ? Mind-body activities, such as deep breathing, meditation, or using words and images to create positive thoughts (guided imagery).  ? Physical activity, such as swimming, jogging, or walking. Tell your health care provider if your palpitations increase with activity. If you have chest pain or shortness of breath with activity, do not continue the activity until you are seen by your health care provider.  ? Biofeedback. This is a method that helps you learn to use your mind to control things in your body, such as your heartbeat.  · Do not use drugs, including cocaine or ecstasy. Do not use marijuana.  · Get plenty of rest and sleep. Keep a regular bed time.  General instructions  · Take over-the-counter and prescription medicines only as told by your health care provider.  · Do not use any products that contain nicotine or tobacco, such as cigarettes and e-cigarettes. If you need help quitting, ask your health care provider.  · Keep all follow-up visits as told by your health care provider. This  is important. These may include visits for further testing if palpitations do not go away or get worse.  Contact a health care provider if you:  · Continue to have a fast or irregular heartbeat after 24 hours.  · Notice that your palpitations occur more often.  Get help right away if you:  · Have chest pain or shortness of breath.  · Have a severe headache.  · Feel dizzy or you faint.  Summary  · Palpitations are feelings that your heartbeat is irregular or is faster than normal. It may feel like your heart is fluttering or skipping a beat.  · Palpitations may be caused by many things, including smoking, caffeine, alcohol, stress, certain medicines, and drugs.  · Although most causes of palpitations are not serious, some causes can be a sign of a serious medical problem.  · Get help right away if you faint or have chest pain, shortness of breath, a severe headache, or dizziness.  This information is not intended to replace advice given to you by your health care provider. Make sure you discuss any questions you have with your health care provider.  Document Revised: 01/30/2019 Document Reviewed: 01/30/2019  [employer]vier Patient Education © 2021 [employer]     show

## 2021-09-30 DIAGNOSIS — I65.23 BILATERAL CAROTID ARTERY STENOSIS: ICD-10-CM

## 2021-09-30 DIAGNOSIS — I65.02 STENOSIS OF LEFT VERTEBRAL ARTERY: ICD-10-CM

## 2021-09-30 DIAGNOSIS — I25.10 CAD IN NATIVE ARTERY: Primary | ICD-10-CM

## 2021-09-30 DIAGNOSIS — E78.00 HYPERCHOLESTEROLEMIA: ICD-10-CM

## 2021-09-30 RX ORDER — EZETIMIBE 10 MG/1
TABLET ORAL
Qty: 90 TABLET | Refills: 0 | Status: SHIPPED | OUTPATIENT
Start: 2021-09-30 | End: 2021-09-30 | Stop reason: SDUPTHER

## 2021-09-30 RX ORDER — EZETIMIBE 10 MG/1
10 TABLET ORAL DAILY
Qty: 90 TABLET | Refills: 3 | Status: SHIPPED | OUTPATIENT
Start: 2021-09-30 | End: 2022-08-24

## 2022-01-10 ENCOUNTER — TELEPHONE (OUTPATIENT)
Dept: CARDIOLOGY | Facility: CLINIC | Age: 67
End: 2022-01-10

## 2022-01-10 NOTE — TELEPHONE ENCOUNTER
Called pt to reschedule the appointment they missed for today and was unable to reach them. Will send a no show letter to pt and their PCP.

## 2022-02-11 ENCOUNTER — OFFICE VISIT (OUTPATIENT)
Dept: CARDIOLOGY | Facility: CLINIC | Age: 67
End: 2022-02-11

## 2022-02-11 VITALS
SYSTOLIC BLOOD PRESSURE: 128 MMHG | TEMPERATURE: 97.5 F | BODY MASS INDEX: 32.27 KG/M2 | WEIGHT: 189 LBS | OXYGEN SATURATION: 97 % | HEART RATE: 94 BPM | HEIGHT: 64 IN | DIASTOLIC BLOOD PRESSURE: 79 MMHG

## 2022-02-11 DIAGNOSIS — I65.02 STENOSIS OF LEFT VERTEBRAL ARTERY: ICD-10-CM

## 2022-02-11 DIAGNOSIS — I25.10 CAD IN NATIVE ARTERY: Primary | ICD-10-CM

## 2022-02-11 DIAGNOSIS — I10 PRIMARY HYPERTENSION: ICD-10-CM

## 2022-02-11 DIAGNOSIS — I65.23 BILATERAL CAROTID ARTERY STENOSIS: ICD-10-CM

## 2022-02-11 DIAGNOSIS — I51.89 GRADE I DIASTOLIC DYSFUNCTION: ICD-10-CM

## 2022-02-11 DIAGNOSIS — I73.9 PERIPHERAL VASCULAR DISEASE: ICD-10-CM

## 2022-02-11 DIAGNOSIS — E78.00 HYPERCHOLESTEROLEMIA: ICD-10-CM

## 2022-02-11 DIAGNOSIS — R60.9 PERIPHERAL EDEMA: ICD-10-CM

## 2022-02-11 DIAGNOSIS — I95.1 ORTHOSTATIC HYPOTENSION: ICD-10-CM

## 2022-02-11 DIAGNOSIS — R06.02 SHORTNESS OF BREATH: ICD-10-CM

## 2022-02-11 PROCEDURE — 99214 OFFICE O/P EST MOD 30 MIN: CPT | Performed by: NURSE PRACTITIONER

## 2022-02-11 RX ORDER — METOPROLOL TARTRATE 100 MG/1
100 TABLET ORAL 2 TIMES DAILY
Qty: 180 TABLET | Refills: 3 | Status: SHIPPED | OUTPATIENT
Start: 2022-02-11 | End: 2022-12-13 | Stop reason: SDUPTHER

## 2022-02-11 NOTE — PROGRESS NOTES
Subjective   Barby Sue is a 66 y.o. female     Chief Complaint   Patient presents with   • Follow-up   • Coronary Artery Disease       HPI    Problem List:    1. Coronary artery disease  1.1 left heart cath 3/20/12-20% right coronary artery, circumflex 50-70% proximal, 20-30% first diagonal, EF 60%  1.2 Follow up stress August 2015 demonstrated chest wall attenuation but no sign of ischemia, preserved ER  1.3 Stress Test 12/14/16 - no ischemia; low risk  1.4 stress test 3/20/19-possible inferior basilar ischemia, preserved post stress EF of 83%, increased transient ischemic dilation ratio of 0.43  1.5 C 4/22/19 -stent proximal circumflex, EF 55 to 60%, LVEDP 16-18, no aortic valve gradient present on catheter pullback  1.6 stress test 4/12/2021-no evidence of ischemia, post-rest EF 65%, mildly elevated transischemic dilation ratio of unknown significance  2. PVD with history of aortobifemoral bypass 2014  2.1 BLE Arterial U/S 12/14/16 - < or equal to 50% right common femoral artery; high-grade stenosis left posterior tibial artery 50-75% stenosis  2.2 CTA Abd with runoff 5/16/17 - patent aorta bi-iliac graft, mild narrowing seen at prox aspect of the celiac trunk.  No high grade stenosis. Fatty infiltration of the liver.   3. Chronic hypotension   4. Hyperlipidemia   5. Carotid Artery Disease   5.1 CTA head 2/15/19 - left external carotid artery is occluded, no sig stenosis KENNEDY; left vertebral artery at arch is near occluded; enlarged precarinal node 1.4 x 1.5 and right of the midline measuring 1.1 x 1.6, emphysema, per patient Neuroradiologist stated not blocked as indicated in CT  5.2 carotid artery ultrasound 4/12/2021-due to 69% stenosis by Doppler in the distal right internal carotid artery and mid and distal left internal carotid artery, antegrade flow both vertebral arteries  6. Echo 12/14/16 - mild LVH; EF > 65%; DD I; trace MR; physiological TR; mild TX  6.1 echo 3/20/19-mild LVH, EF 61-65%, diastolic  dysfunction 1  6.2 Echo 4/12/2021-EF 55 to 60%, diastolic dysfunction 1, trivial AI and trivial MR  7. COPD/Emphysema, Dr. Garcia using O2 2L NC @ HS    8. Palpitations  8.1 Event Monitor 3/31-4/11/17 - NSR - ST  8.2 event monitor 3/10-3/23/2021-normal sinus rhythm    Patient is a 66-year-old female who presents today for follow-up.  She denies any chest pain, pressure, palpitations, fluttering, dizziness, presyncope, syncope, orthopnea, PND or edema.  She denies any shortness of breath with activity.  She says she felt like she was getting kidney infection so she stopped her HCTZ for couple days and then has been taking it every other day and been fine since.  Her blood pressure was slightly elevated heart rate stays high so I am going to increase her metoprolol little bit.  She will monitor her blood pressure.    Current Outpatient Medications on File Prior to Visit   Medication Sig Dispense Refill   • Acetaminophen (TYLENOL ARTHRITIS PAIN PO) Take 1 tablet by mouth Every 8 (Eight) Hours As Needed.     • aspirin 81 MG EC tablet Take 81 mg by mouth Daily.     • atorvastatin (LIPITOR) 80 MG tablet TAKE 1 TABLET BY MOUTH DAILY. 30 tablet 11   • Biotin 1000 MCG tablet Take 1 tablet by mouth daily.     • BLACK COHOSH PO Take 1,000 mg by mouth daily.     • CloNIDine (CATAPRES) 0.1 MG tablet Take 1 tablet by mouth Every 12 (Twelve) Hours. 180 tablet 3   • clopidogrel (PLAVIX) 75 MG tablet TAKE 1 TABLET BY MOUTH DAILY. 90 tablet 3   • cyclobenzaprine (FLEXERIL) 10 MG tablet Take 1 tablet by mouth 3 (three) times a day.     • docusate sodium (COLACE) 100 MG capsule Take 100 mg by mouth As Needed.  5   • escitalopram (LEXAPRO) 10 MG tablet Take 10 mg by mouth Daily.  0   • ezetimibe (ZETIA) 10 MG tablet Take 1 tablet by mouth Daily. 90 tablet 3   • fluticasone (FLONASE) 50 MCG/ACT nasal spray   3   • Fluticasone-Umeclidin-Vilant (TRELEGY ELLIPTA IN) Inhale.     • furosemide (LASIX) 20 MG tablet TAKE 1 TABLET BY MOUTH DAILY.  90 tablet 3   • gabapentin (NEURONTIN) 400 MG capsule Take 400 mg by mouth 3 (Three) Times a Day.     • hydroCHLOROthiazide (HYDRODIURIL) 12.5 MG tablet TAKE 1 TABLET BY MOUTH DAILY. (Patient taking differently: Take 12.5 mg by mouth Daily. Every other day) 90 tablet 3   • isosorbide mononitrate (IMDUR) 30 MG 24 hr tablet TAKE 1 TABLET BY MOUTH DAILY. 90 tablet 3   • losartan (COZAAR) 100 MG tablet TAKE 1 TABLET BY MOUTH DAILY. 90 tablet 3   • midodrine (PROAMATINE) 2.5 MG tablet Take 1 tablet by mouth 2 (Two) Times a Day Before Meals. 180 tablet 3   • nitroglycerin (NITROSTAT) 0.4 MG SL tablet PLACE 1 TABLET UNDER THE TONGUE EVERY 5 (FIVE) MINUTES AS NEEDED FOR CHEST PAIN. ONLY TAKE UP TO 3 TABLETS. CALL 911 IF PAIN PERSISTS. 25 tablet 5   • oxyCODONE-acetaminophen (PERCOCET)  MG per tablet Take 1 tablet by mouth every 8 (eight) hours as needed for moderate pain (4-6).     • pantoprazole (PROTONIX) 40 MG EC tablet TAKE 1 TABLET BY MOUTH DAILY. 90 tablet 3   • potassium chloride (K-DUR,KLOR-CON) 20 MEQ CR tablet TAKE 1 CAPSULE BY MOUTH DAILY. 90 tablet 3   • vitamin D (ERGOCALCIFEROL) 92315 UNITS capsule capsule 1 (One) Time Per Week.     • [DISCONTINUED] metoprolol tartrate 75 MG tablet Take 75 mg by mouth Every 12 (Twelve) Hours. 270 tablet 1     No current facility-administered medications on file prior to visit.       ALLERGIES    Lisinopril, Adhesive tape, Ibuprofen, and Latex    Past Medical History:   Diagnosis Date   • Anxiety    • Back pain    • Carotid bruit    • Chest pain    • COPD (chronic obstructive pulmonary disease) ([facility])    • COVID-19 vaccine administered 1st - 03/08/2021    2nd - 04/05/2021 - Moderna ( Booster 12/27/2021 ) Moderna    • Dyslipidemia    • Fainting    • Functional murmur    • GERD (gastroesophageal reflux disease)    • Hypercholesterolemia    • Hyperlipidemia    • Hypertension    • Hypokalemia    • Orthostatic hypotension    • Orthostatic hypotension    • Peripheral vascular  disease (HCC)    • Peripheral vascular disease (HCC)     PVD with history of aortobifemoral bypass   • Shortness of breath    • Sleep apnea        Social History     Socioeconomic History   • Marital status:    Tobacco Use   • Smoking status: Current Every Day Smoker     Packs/day: 0.50     Years: 40.00     Pack years: 20.00     Types: Cigarettes   • Smokeless tobacco: Never Used   • Tobacco comment: Pt states she stopped smoking 3/14/19   Substance and Sexual Activity   • Alcohol use: No   • Drug use: Never   • Sexual activity: Defer       Family History   Problem Relation Age of Onset   • Heart failure Mother         Congestive   • Hypertension Mother    • Heart failure Sister         Congestive   • Coronary artery disease Sister    • Heart attack Sister         Acute   • Hypertension Sister    • Sudden death Sister    • Coronary artery disease Brother    • Seizures Brother    • Hypertension Brother    • Heart failure Maternal Grandmother         Congestive       Review of Systems   Constitutional: Negative for appetite change, chills, diaphoresis, fatigue and fever.   HENT: Positive for nosebleeds (2 weeks ago she states that her nose bleed for 15 mins and did it off and on for 2 weks ). Negative for congestion, rhinorrhea and sore throat.    Eyes: Positive for visual disturbance (glasses).   Respiratory: Negative for cough, chest tightness, shortness of breath and wheezing.    Cardiovascular: Negative for chest pain, palpitations and leg swelling.   Gastrointestinal: Positive for abdominal pain (left lower side pain last week she feels like it was related to a kidney inf she stated taking her HCTZ every other day ) and constipation (at times she has meds to help and due to pain meds ). Negative for blood in stool, diarrhea, nausea and vomiting.   Endocrine: Negative for cold intolerance and heat intolerance.   Genitourinary: Positive for difficulty urinating (last week she felt like she needed to use the  "bathroom and when she got there she states she would just do small drops ), frequency (several times a day due to drinking alot of water states that she has stopped pepsi and since she decreased the HCTZ she has not had to get up alot in the night ) and urgency (when she goes she only does small amounts ( drops ) and feels bloated ). Negative for dysuria and hematuria.   Musculoskeletal: Positive for arthralgias (through out the entrie body ), back pain (lower and upper back ( she states that she has been getting Cramps in her upper back ), joint swelling (knees ) and neck pain (back of the neck ). Negative for neck stiffness.   Skin: Positive for wound (spots in the inside of her hands ( hard spots) they are painful and she has hard time using her hands to open things ). Negative for color change, pallor and rash.   Allergic/Immunologic: Positive for environmental allergies (latex). Negative for food allergies.   Neurological: Positive for weakness (legs from the HCTZ and she states that her legs and feet have started cramping and are really tight ). Negative for dizziness, light-headedness, numbness and headaches.   Hematological: Bruises/bleeds easily (due to meds ).   Psychiatric/Behavioral: Negative for sleep disturbance.       Objective   /79 (BP Location: Left arm, Patient Position: Sitting)   Pulse 94   Temp 97.5 °F (36.4 °C)   Ht 162.6 cm (64\")   Wt 85.7 kg (189 lb)   SpO2 97%   BMI 32.44 kg/m²   Vitals:    02/11/22 0950 02/11/22 1013   BP: 162/94 128/79   BP Location: Left arm Left arm   Patient Position:  Sitting   Pulse: 94    Temp: 97.5 °F (36.4 °C)    SpO2: 97%    Weight: 85.7 kg (189 lb)    Height: 162.6 cm (64\")       Lab Results (most recent)     None        Physical Exam  Vitals reviewed.   Constitutional:       General: She is awake.      Appearance: Normal appearance. She is well-developed. She is obese.   HENT:      Head: Normocephalic.   Eyes:      General: Lids are normal.      " Comments: Wears glasses    Neck:      Vascular: No carotid bruit, hepatojugular reflux or JVD.   Cardiovascular:      Rate and Rhythm: Normal rate and regular rhythm.      Pulses:           Radial pulses are 2+ on the right side and 2+ on the left side.        Dorsalis pedis pulses are 2+ on the right side and 2+ on the left side.        Posterior tibial pulses are 2+ on the right side and 2+ on the left side.      Heart sounds: Normal heart sounds.   Pulmonary:      Effort: Pulmonary effort is normal.      Breath sounds: Normal breath sounds and air entry.   Abdominal:      General: Bowel sounds are normal.      Palpations: Abdomen is soft.   Musculoskeletal:      Right lower leg: No edema.      Left lower leg: No edema.   Skin:     General: Skin is warm and dry.   Neurological:      Mental Status: She is alert and oriented to person, place, and time.   Psychiatric:         Attention and Perception: Attention and perception normal.         Mood and Affect: Mood and affect normal.         Speech: Speech normal.         Behavior: Behavior normal. Behavior is cooperative.         Thought Content: Thought content normal.         Cognition and Memory: Cognition and memory normal.         Judgment: Judgment normal.         Procedure   Procedures         Assessment/Plan      Diagnosis Plan   1. CAD in native artery     2. Orthostatic hypotension     3. Grade I diastolic dysfunction     4. Hypercholesterolemia     5. Bilateral carotid artery stenosis     6. Primary hypertension  metoprolol tartrate (LOPRESSOR) 100 MG tablet   7. Shortness of breath     8. Peripheral edema     9. Stenosis of left vertebral artery     10. Peripheral vascular disease (HCC)         Return in about 4 months (around 6/11/2022).    CAD-patient's on aspirin, statin, Plavix and beta.  Orthostatic hypotension-stable.  Diastolic dysfunction/peripheral edema-on hydrochlorothiazide with no signs of failure.  Hypercholesteremia-patient's on Lipitor and  Zetia and doing well.  Bilateral carotid artery disease-patient's on aspirin and statin.  Hypertension-patient's on beta-blocker, hydrochlorothiazide and losartan.  Shortness of breath-resolved.  Vertebral artery stenosis-patient's on aspirin and statin.  She will continue her medication regimen with above-noted change.  She will 0.4 months or sooner if any changes.       Barby Sue  reports that she has been smoking cigarettes. She has a 20.00 pack-year smoking history. She has never used smokeless tobacco..Patient did not bring med list or medicine bottles to appointment, med list has been reviewed and updated based on patient's knowledge of their meds.     Electronically signed by:

## 2022-02-22 RX ORDER — HYDROCHLOROTHIAZIDE 12.5 MG/1
CAPSULE, GELATIN COATED ORAL
Qty: 90 CAPSULE | Refills: 3 | Status: SHIPPED | OUTPATIENT
Start: 2022-02-22 | End: 2022-06-13 | Stop reason: SDUPTHER

## 2022-03-17 DIAGNOSIS — R60.9 PERIPHERAL EDEMA: ICD-10-CM

## 2022-03-17 RX ORDER — FUROSEMIDE 20 MG/1
TABLET ORAL
Qty: 90 TABLET | Refills: 3 | Status: SHIPPED | OUTPATIENT
Start: 2022-03-17 | End: 2022-12-13 | Stop reason: SDUPTHER

## 2022-04-22 DIAGNOSIS — I25.10 CAD IN NATIVE ARTERY: ICD-10-CM

## 2022-04-22 DIAGNOSIS — I10 ESSENTIAL HYPERTENSION: ICD-10-CM

## 2022-04-22 DIAGNOSIS — I95.1 HYPOTENSION, POSTURAL: ICD-10-CM

## 2022-04-22 RX ORDER — LOSARTAN POTASSIUM 100 MG/1
TABLET ORAL
Qty: 90 TABLET | Refills: 1 | Status: SHIPPED | OUTPATIENT
Start: 2022-04-22 | End: 2022-08-24

## 2022-04-22 RX ORDER — MIDODRINE HYDROCHLORIDE 2.5 MG/1
TABLET ORAL
Qty: 270 TABLET | Refills: 1 | Status: SHIPPED | OUTPATIENT
Start: 2022-04-22 | End: 2022-08-24

## 2022-04-22 RX ORDER — ISOSORBIDE MONONITRATE 30 MG/1
TABLET, EXTENDED RELEASE ORAL
Qty: 90 TABLET | Refills: 1 | Status: SHIPPED | OUTPATIENT
Start: 2022-04-22 | End: 2022-08-24

## 2022-04-22 RX ORDER — POTASSIUM CHLORIDE 750 MG/1
TABLET, FILM COATED, EXTENDED RELEASE ORAL
Qty: 180 TABLET | Refills: 1 | Status: SHIPPED | OUTPATIENT
Start: 2022-04-22 | End: 2022-08-24

## 2022-04-25 DIAGNOSIS — K21.9 GASTROESOPHAGEAL REFLUX DISEASE: ICD-10-CM

## 2022-04-25 DIAGNOSIS — I65.02 STENOSIS OF LEFT VERTEBRAL ARTERY: ICD-10-CM

## 2022-04-25 DIAGNOSIS — E78.5 DYSLIPIDEMIA: ICD-10-CM

## 2022-04-25 DIAGNOSIS — I73.9 PERIPHERAL VASCULAR DISEASE: ICD-10-CM

## 2022-04-25 DIAGNOSIS — I25.10 CAD IN NATIVE ARTERY: ICD-10-CM

## 2022-04-25 RX ORDER — CLOPIDOGREL BISULFATE 75 MG/1
TABLET ORAL
Qty: 90 TABLET | Refills: 1 | Status: SHIPPED | OUTPATIENT
Start: 2022-04-25 | End: 2022-12-13 | Stop reason: SDUPTHER

## 2022-04-25 RX ORDER — ATORVASTATIN CALCIUM 80 MG/1
TABLET, FILM COATED ORAL
Qty: 90 TABLET | Refills: 1 | Status: SHIPPED | OUTPATIENT
Start: 2022-04-25 | End: 2022-12-13 | Stop reason: SDUPTHER

## 2022-04-25 RX ORDER — PANTOPRAZOLE SODIUM 40 MG/1
TABLET, DELAYED RELEASE ORAL
Qty: 90 TABLET | Refills: 1 | Status: SHIPPED | OUTPATIENT
Start: 2022-04-25 | End: 2023-01-16

## 2022-06-13 ENCOUNTER — OFFICE VISIT (OUTPATIENT)
Dept: CARDIOLOGY | Facility: CLINIC | Age: 67
End: 2022-06-13

## 2022-06-13 VITALS
HEART RATE: 83 BPM | BODY MASS INDEX: 34.55 KG/M2 | WEIGHT: 183 LBS | TEMPERATURE: 96.4 F | SYSTOLIC BLOOD PRESSURE: 136 MMHG | DIASTOLIC BLOOD PRESSURE: 84 MMHG | HEIGHT: 61 IN | OXYGEN SATURATION: 98 %

## 2022-06-13 DIAGNOSIS — R00.2 PALPITATIONS: ICD-10-CM

## 2022-06-13 DIAGNOSIS — I10 PRIMARY HYPERTENSION: ICD-10-CM

## 2022-06-13 DIAGNOSIS — I65.02 STENOSIS OF LEFT VERTEBRAL ARTERY: ICD-10-CM

## 2022-06-13 DIAGNOSIS — R06.02 SHORTNESS OF BREATH: ICD-10-CM

## 2022-06-13 DIAGNOSIS — I51.89 GRADE I DIASTOLIC DYSFUNCTION: ICD-10-CM

## 2022-06-13 DIAGNOSIS — R60.9 PERIPHERAL EDEMA: ICD-10-CM

## 2022-06-13 DIAGNOSIS — E78.5 DYSLIPIDEMIA: ICD-10-CM

## 2022-06-13 DIAGNOSIS — I65.23 BILATERAL CAROTID ARTERY STENOSIS: ICD-10-CM

## 2022-06-13 DIAGNOSIS — I73.9 PERIPHERAL VASCULAR DISEASE: ICD-10-CM

## 2022-06-13 DIAGNOSIS — I25.10 CAD IN NATIVE ARTERY: Primary | ICD-10-CM

## 2022-06-13 PROCEDURE — 99214 OFFICE O/P EST MOD 30 MIN: CPT | Performed by: NURSE PRACTITIONER

## 2022-06-13 RX ORDER — HYDROCHLOROTHIAZIDE 12.5 MG/1
12.5 TABLET ORAL EVERY OTHER DAY
Qty: 90 TABLET | Refills: 3
Start: 2022-06-13 | End: 2022-12-13 | Stop reason: SDUPTHER

## 2022-06-13 NOTE — PROGRESS NOTES
Subjective   Barby Sue is a 67 y.o. female     Chief Complaint   Patient presents with   • Follow-up       HPI    Problem List:    1. Coronary artery disease  1.1 left heart cath 3/20/12-20% right coronary artery, circumflex 50-70% proximal, 20-30% first diagonal, EF 60%  1.2 Follow up stress August 2015 demonstrated chest wall attenuation but no sign of ischemia, preserved ER  1.3 Stress Test 12/14/16 - no ischemia; low risk  1.4 stress test 3/20/19-possible inferior basilar ischemia, preserved post stress EF of 83%, increased transient ischemic dilation ratio of 0.43  1.5 C 4/22/19 -stent proximal circumflex, EF 55 to 60%, LVEDP 16-18, no aortic valve gradient present on catheter pullback  1.6 stress test 4/12/2021-no evidence of ischemia, post-rest EF 65%, mildly elevated transischemic dilation ratio of unknown significance  2. PVD with history of aortobifemoral bypass 2014  2.1 BLE Arterial U/S 12/14/16 - < or equal to 50% right common femoral artery; high-grade stenosis left posterior tibial artery 50-75% stenosis  2.2 CTA Abd with runoff 5/16/17 - patent aorta bi-iliac graft, mild narrowing seen at prox aspect of the celiac trunk.  No high grade stenosis. Fatty infiltration of the liver.   3. Chronic hypotension   4. Hyperlipidemia   5. Carotid Artery Disease   5.1 CTA head 2/15/19 - left external carotid artery is occluded, no sig stenosis KENNEDY; left vertebral artery at arch is near occluded; enlarged precarinal node 1.4 x 1.5 and right of the midline measuring 1.1 x 1.6, emphysema, per patient Neuroradiologist stated not blocked as indicated in CT  5.2 carotid artery ultrasound 4/12/2021-due to 69% stenosis by Doppler in the distal right internal carotid artery and mid and distal left internal carotid artery, antegrade flow both vertebral arteries  6. Echo 12/14/16 - mild LVH; EF > 65%; DD I; trace MR; physiological TR; mild SC  6.1 echo 3/20/19-mild LVH, EF 61-65%, diastolic dysfunction 1  6.2 Echo  2021-EF 55 to 60%, diastolic dysfunction 1, trivial AI and trivial MR  7. COPD/Emphysema, Dr. Garcia using O2 2L NC @ HS    8. Palpitations  8.1 Event Monitor 3/31-17 - NSR - ST  8.2 event monitor 3/10-3/23/2021-normal sinus rhythm    Patient is a 67-year-old female who presents today for a follow-up.  She denies any chest pain or pressure.  She says the only time she notices palpitations that she says is more of a racing is when she first goes to bed.  She says that it feels like she ran to her bed.  She says she has learned to do breathing techniques and it resolves in less than a minute.  She denies any dizziness, presyncope, syncope, orthopnea or PND.  She does get swelling in her legs and it goes down overnight.  She says she only has shortness of breath if she overdoes it.  She has been fatigued but she is under a lot of stress.  She has a 47-year-old daughter he may need lung transplant and then she also has another daughter who is 32 years old its meeting with a surgeon for a mass in her breast.  They are not aware yet if it is benign or if it is cancerous.  Patient's brother  at 41 from pancreatic cancer.    We went over her CT and her labs.  Her LDL was 84 and triglycerides were 93.    Current Outpatient Medications on File Prior to Visit   Medication Sig Dispense Refill   • Acetaminophen (TYLENOL ARTHRITIS PAIN PO) Take 1 tablet by mouth Every 8 (Eight) Hours As Needed.     • aspirin 81 MG EC tablet Take 81 mg by mouth Daily.     • atorvastatin (LIPITOR) 80 MG tablet TAKE ONE TABLET BY MOUTH EVERY DAY 90 tablet 1   • Biotin 1000 MCG tablet Take 1 tablet by mouth daily.     • BLACK COHOSH PO Take 1,000 mg by mouth daily.     • CloNIDine (CATAPRES) 0.1 MG tablet Take 1 tablet by mouth Every 12 (Twelve) Hours. 180 tablet 3   • clopidogrel (PLAVIX) 75 MG tablet TAKE ONE TABLET BY MOUTH EVERY DAY 90 tablet 1   • cyclobenzaprine (FLEXERIL) 10 MG tablet Take 1 tablet by mouth 3 (three) times a day.      • docusate sodium (COLACE) 100 MG capsule Take 100 mg by mouth As Needed.  5   • escitalopram (LEXAPRO) 10 MG tablet Take 10 mg by mouth Daily.  0   • ezetimibe (ZETIA) 10 MG tablet Take 1 tablet by mouth Daily. 90 tablet 3   • fluticasone (FLONASE) 50 MCG/ACT nasal spray   3   • Fluticasone-Umeclidin-Vilant (TRELEGY ELLIPTA IN) Inhale.     • furosemide (LASIX) 20 MG tablet TAKE ONE TABLET BY MOUTH EVERY DAY 90 tablet 3   • gabapentin (NEURONTIN) 400 MG capsule Take 400 mg by mouth 3 (Three) Times a Day.     • isosorbide mononitrate (IMDUR) 30 MG 24 hr tablet TAKE ONE TABLET BY MOUTH EVERY DAY 90 tablet 1   • losartan (COZAAR) 100 MG tablet TAKE ONE TABLET BY MOUTH EVERY DAY 90 tablet 1   • metoprolol tartrate (LOPRESSOR) 100 MG tablet Take 1 tablet by mouth 2 (Two) Times a Day. 180 tablet 3   • midodrine (PROAMATINE) 2.5 MG tablet TAKE ONE TABLET BY MOUTH THREE TIMES A  tablet 1   • nitroglycerin (NITROSTAT) 0.4 MG SL tablet PLACE 1 TABLET UNDER THE TONGUE EVERY 5 (FIVE) MINUTES AS NEEDED FOR CHEST PAIN. ONLY TAKE UP TO 3 TABLETS. CALL 911 IF PAIN PERSISTS. 25 tablet 5   • oxyCODONE-acetaminophen (PERCOCET)  MG per tablet Take 1 tablet by mouth every 8 (eight) hours as needed for moderate pain (4-6).     • pantoprazole (PROTONIX) 40 MG EC tablet TAKE ONE TABLET BY MOUTH EVERY DAY 90 tablet 1   • potassium chloride (K-DUR,KLOR-CON) 20 MEQ CR tablet TAKE 1 CAPSULE BY MOUTH DAILY. 90 tablet 3   • potassium chloride 10 MEQ CR tablet TAKE 2 TABLETS BY MOUTH EVERY DAY (Patient taking differently: Take 10 mEq by mouth 2 (Two) Times a Day. Pt only takes the Potassium when she takes the Lasix) 180 tablet 1   • [DISCONTINUED] hydroCHLOROthiazide (HYDRODIURIL) 12.5 MG tablet TAKE 1 TABLET BY MOUTH DAILY. (Patient taking differently: Take 12.5 mg by mouth Daily. Every other day) 90 tablet 3   • [DISCONTINUED] hydroCHLOROthiazide (MICROZIDE) 12.5 MG capsule TAKE ONE CAPSULE BY MOUTH DAILY 90 capsule 3   •  [DISCONTINUED] vitamin D (ERGOCALCIFEROL) 11046 UNITS capsule capsule 1 (One) Time Per Week.       No current facility-administered medications on file prior to visit.       ALLERGIES    Lisinopril, Adhesive tape, Ibuprofen, and Latex    Past Medical History:   Diagnosis Date   • Anxiety    • Back pain    • Carotid bruit    • Chest pain    • COPD (chronic obstructive pulmonary disease) (HCC)    • COVID-19 vaccine administered 1st - 03/08/2021    2nd - 04/05/2021 - Moderna ( Booster 12/27/2021 ) Moderna    • Dyslipidemia    • Fainting    • Functional murmur    • GERD (gastroesophageal reflux disease)    • Hypercholesterolemia    • Hyperlipidemia    • Hypertension    • Hypokalemia    • Orthostatic hypotension    • Orthostatic hypotension    • Peripheral vascular disease (HCC)    • Peripheral vascular disease (HCC)     PVD with history of aortobifemoral bypass   • Shortness of breath    • Sleep apnea        Social History     Socioeconomic History   • Marital status:    Tobacco Use   • Smoking status: Current Every Day Smoker     Packs/day: 0.50     Years: 40.00     Pack years: 20.00     Types: Cigarettes   • Smokeless tobacco: Never Used   • Tobacco comment: Pt states she stopped smoking 3/14/19   Vaping Use   • Vaping Use: Never used   Substance and Sexual Activity   • Alcohol use: No   • Drug use: Never   • Sexual activity: Defer       Family History   Problem Relation Age of Onset   • Heart failure Mother         Congestive   • Hypertension Mother    • Heart failure Sister         Congestive   • Coronary artery disease Sister    • Heart attack Sister         Acute   • Hypertension Sister    • Sudden death Sister    • Coronary artery disease Brother    • Seizures Brother    • Hypertension Brother    • Heart failure Maternal Grandmother         Congestive       Review of Systems   Constitutional: Positive for appetite change (decreased appeitie ) and fatigue (a lot of stress). Negative for chills, diaphoresis  "and fever.   HENT: Negative for congestion, rhinorrhea and sore throat.    Eyes: Positive for visual disturbance (glasses).   Respiratory: Positive for shortness of breath (if she over does it ). Negative for cough, chest tightness and wheezing.    Cardiovascular: Positive for palpitations (fluttering , races and pounds ( she has stopped caffenie ) she states that her heart races at night once she lays down at night; when she first lays down, races like she runs to the bed; does breathing exercises and it helps ) and leg swelling (legs, feet and ankles at times swelling will go down at night ). Negative for chest pain.   Gastrointestinal: Negative for abdominal pain, blood in stool, constipation, diarrhea, nausea and vomiting.   Endocrine: Negative for cold intolerance and heat intolerance.   Genitourinary: Positive for frequency (due to taking water pills ). Negative for difficulty urinating, dysuria, hematuria and urgency.   Musculoskeletal: Positive for arthralgias (feet , hands , knees ), back pain (lower back ), joint swelling (hands and feet ), neck pain (base back of the neck ) and neck stiffness (stiffness when turning side to side ).   Skin: Negative for color change, pallor, rash and wound.   Allergic/Immunologic: Negative for environmental allergies and food allergies.   Neurological: Negative for dizziness, weakness, light-headedness, numbness and headaches.   Hematological: Does not bruise/bleed easily.   Psychiatric/Behavioral: Positive for sleep disturbance (oxygen at night 2 lts ).       Objective   /84 (BP Location: Left arm, Patient Position: Sitting)   Pulse 83   Temp 96.4 °F (35.8 °C)   Ht 154.9 cm (61\")   Wt 83 kg (183 lb)   SpO2 98%   BMI 34.58 kg/m²   Vitals:    06/13/22 1028   BP: 136/84   BP Location: Left arm   Patient Position: Sitting   Pulse: 83   Temp: 96.4 °F (35.8 °C)   SpO2: 98%   Weight: 83 kg (183 lb)   Height: 154.9 cm (61\")      Lab Results (most recent)     None    "     Physical Exam  Vitals reviewed.   Constitutional:       General: She is awake.      Appearance: Normal appearance. She is well-developed and well-groomed. She is obese.   HENT:      Head: Normocephalic.   Eyes:      General: Lids are normal.      Comments: Wears glasses    Neck:      Vascular: No carotid bruit, hepatojugular reflux or JVD.   Cardiovascular:      Rate and Rhythm: Normal rate and regular rhythm.      Pulses:           Radial pulses are 2+ on the right side and 2+ on the left side.        Dorsalis pedis pulses are 2+ on the right side and 2+ on the left side.        Posterior tibial pulses are 2+ on the right side and 2+ on the left side.      Heart sounds: Normal heart sounds.   Pulmonary:      Effort: Pulmonary effort is normal.      Breath sounds: Normal breath sounds and air entry.   Abdominal:      General: Bowel sounds are normal.      Palpations: Abdomen is soft.   Musculoskeletal:      Right lower leg: No edema.      Left lower leg: No edema.   Skin:     General: Skin is warm and dry.   Neurological:      Mental Status: She is alert and oriented to person, place, and time.   Psychiatric:         Attention and Perception: Attention and perception normal.         Mood and Affect: Mood and affect normal.         Speech: Speech normal.         Behavior: Behavior normal. Behavior is cooperative.         Thought Content: Thought content normal.         Cognition and Memory: Cognition and memory normal.         Judgment: Judgment normal.         Procedure   Procedures         Assessment & Plan      Diagnosis Plan   1. CAD in native artery     2. Dyslipidemia     3. Grade I diastolic dysfunction     4. Peripheral vascular disease (HCC)     5. Stenosis of left vertebral artery     6. Bilateral carotid artery stenosis     7. Primary hypertension  hydroCHLOROthiazide (HYDRODIURIL) 12.5 MG tablet   8. Shortness of breath     9. Peripheral edema     10. Palpitations         Return in about 6 months  (around 12/13/2022).    CAD-patient's on aspirin, statin, Zetia and beta.  Dyslipidemia-patient's on Lipitor and Zetia and her last LDL was 84.  Diastolic dysfunction/peripheral edema-patient uses Lasix and is on hydrochlorothiazide.  PVD-patient's on aspirin and statin.  Vertebral artery stenosis-patient's on aspirin and statin.  Bilateral carotid artery disease-patient's on aspirin and statin.  Hypertension-patient is on hydrochlorothiazide, losartan and metoprolol.  She is doing well.  She is just under a lot of stress.  Shortness of breath-stable.  Palpitations-stable on beta-blocker.  She will continue her medication regimen.  She will follow-up in 6 months or sooner if any changes.       Barby Sue  reports that she has been smoking cigarettes. She has a 20.00 pack-year smoking history. She has never used smokeless tobacco.. I have educated her on the risk of diseases from using tobacco products such as cancer, COPD and heart disease.     I advised her to quit and she is not willing to quit.    I spent 3  minutes counseling the patient. Patient brought in medicine list to appointment, it's been reviewed with patient and med list was updated in the chart.          Advance Care Planning   ACP discussion was declined by the patient. Patient does not have an advance directive, declines further assistance.         Electronically signed by:

## 2022-08-24 DIAGNOSIS — T50.2X5A DIURETIC-INDUCED HYPOKALEMIA: Primary | ICD-10-CM

## 2022-08-24 DIAGNOSIS — I95.1 HYPOTENSION, POSTURAL: ICD-10-CM

## 2022-08-24 DIAGNOSIS — I25.10 CAD IN NATIVE ARTERY: ICD-10-CM

## 2022-08-24 DIAGNOSIS — I65.02 STENOSIS OF LEFT VERTEBRAL ARTERY: ICD-10-CM

## 2022-08-24 DIAGNOSIS — I65.23 BILATERAL CAROTID ARTERY STENOSIS: ICD-10-CM

## 2022-08-24 DIAGNOSIS — E78.00 HYPERCHOLESTEROLEMIA: ICD-10-CM

## 2022-08-24 DIAGNOSIS — E87.6 DIURETIC-INDUCED HYPOKALEMIA: Primary | ICD-10-CM

## 2022-08-24 DIAGNOSIS — I10 ESSENTIAL HYPERTENSION: ICD-10-CM

## 2022-08-24 RX ORDER — ISOSORBIDE MONONITRATE 30 MG/1
TABLET, EXTENDED RELEASE ORAL
Qty: 90 TABLET | Refills: 1 | Status: SHIPPED | OUTPATIENT
Start: 2022-08-24 | End: 2023-03-10

## 2022-08-24 RX ORDER — LOSARTAN POTASSIUM 100 MG/1
TABLET ORAL
Qty: 90 TABLET | Refills: 1 | Status: SHIPPED | OUTPATIENT
Start: 2022-08-24 | End: 2023-03-10

## 2022-08-24 RX ORDER — POTASSIUM CHLORIDE 750 MG/1
TABLET, FILM COATED, EXTENDED RELEASE ORAL
Qty: 180 TABLET | Refills: 1 | Status: SHIPPED | OUTPATIENT
Start: 2022-08-24 | End: 2022-12-13 | Stop reason: SDUPTHER

## 2022-08-24 RX ORDER — EZETIMIBE 10 MG/1
10 TABLET ORAL DAILY
Qty: 90 TABLET | Refills: 3 | Status: SHIPPED | OUTPATIENT
Start: 2022-08-24

## 2022-08-24 RX ORDER — MIDODRINE HYDROCHLORIDE 2.5 MG/1
TABLET ORAL
Qty: 270 TABLET | Refills: 1 | Status: SHIPPED | OUTPATIENT
Start: 2022-08-24 | End: 2023-03-10

## 2022-08-24 NOTE — TELEPHONE ENCOUNTER
THERE IS X 2 K+ DOSES ON MED LIST AND BOTH WITH DIFFERENT DIRECTIONS OR HOW PATIENT TAKES THEM SO REFILL FOR K+ ON REFILL WILL NEED TO BE FIXED. PH,LPN

## 2022-12-13 ENCOUNTER — OFFICE VISIT (OUTPATIENT)
Dept: CARDIOLOGY | Facility: CLINIC | Age: 67
End: 2022-12-13

## 2022-12-13 VITALS
OXYGEN SATURATION: 95 % | HEART RATE: 87 BPM | WEIGHT: 186 LBS | DIASTOLIC BLOOD PRESSURE: 82 MMHG | SYSTOLIC BLOOD PRESSURE: 153 MMHG | TEMPERATURE: 98.2 F | BODY MASS INDEX: 34.23 KG/M2 | HEIGHT: 62 IN

## 2022-12-13 DIAGNOSIS — R06.02 SHORTNESS OF BREATH: ICD-10-CM

## 2022-12-13 DIAGNOSIS — E78.5 DYSLIPIDEMIA: ICD-10-CM

## 2022-12-13 DIAGNOSIS — I10 PRIMARY HYPERTENSION: ICD-10-CM

## 2022-12-13 DIAGNOSIS — R60.9 PERIPHERAL EDEMA: ICD-10-CM

## 2022-12-13 DIAGNOSIS — R00.2 PALPITATIONS: ICD-10-CM

## 2022-12-13 DIAGNOSIS — I95.1 ORTHOSTATIC HYPOTENSION: ICD-10-CM

## 2022-12-13 DIAGNOSIS — I51.89 GRADE I DIASTOLIC DYSFUNCTION: ICD-10-CM

## 2022-12-13 DIAGNOSIS — E87.6 DIURETIC-INDUCED HYPOKALEMIA: ICD-10-CM

## 2022-12-13 DIAGNOSIS — I73.9 PERIPHERAL VASCULAR DISEASE: ICD-10-CM

## 2022-12-13 DIAGNOSIS — I65.02 STENOSIS OF LEFT VERTEBRAL ARTERY: ICD-10-CM

## 2022-12-13 DIAGNOSIS — I65.23 BILATERAL CAROTID ARTERY STENOSIS: ICD-10-CM

## 2022-12-13 DIAGNOSIS — I25.10 CAD IN NATIVE ARTERY: Primary | ICD-10-CM

## 2022-12-13 DIAGNOSIS — T50.2X5A DIURETIC-INDUCED HYPOKALEMIA: ICD-10-CM

## 2022-12-13 PROCEDURE — 93000 ELECTROCARDIOGRAM COMPLETE: CPT | Performed by: NURSE PRACTITIONER

## 2022-12-13 PROCEDURE — 99214 OFFICE O/P EST MOD 30 MIN: CPT | Performed by: NURSE PRACTITIONER

## 2022-12-13 RX ORDER — POTASSIUM CHLORIDE 750 MG/1
20 TABLET, FILM COATED, EXTENDED RELEASE ORAL DAILY
Qty: 180 TABLET | Refills: 1 | Status: CANCELLED | OUTPATIENT
Start: 2022-12-13

## 2022-12-13 RX ORDER — CLONIDINE HYDROCHLORIDE 0.1 MG/1
0.1 TABLET ORAL EVERY 12 HOURS SCHEDULED
Qty: 180 TABLET | Refills: 3 | Status: SHIPPED | OUTPATIENT
Start: 2022-12-13

## 2022-12-13 RX ORDER — METOPROLOL TARTRATE 100 MG/1
100 TABLET ORAL 2 TIMES DAILY
Qty: 180 TABLET | Refills: 3 | Status: SHIPPED | OUTPATIENT
Start: 2022-12-13

## 2022-12-13 RX ORDER — CLOPIDOGREL BISULFATE 75 MG/1
75 TABLET ORAL DAILY
Qty: 90 TABLET | Refills: 3 | Status: SHIPPED | OUTPATIENT
Start: 2022-12-13

## 2022-12-13 RX ORDER — POTASSIUM CHLORIDE 20 MEQ/1
20 TABLET, EXTENDED RELEASE ORAL DAILY PRN
Qty: 90 TABLET | Refills: 3 | Status: SHIPPED | OUTPATIENT
Start: 2022-12-13

## 2022-12-13 RX ORDER — HYDROCHLOROTHIAZIDE 12.5 MG/1
12.5 TABLET ORAL EVERY OTHER DAY
Qty: 90 TABLET | Refills: 3
Start: 2022-12-13

## 2022-12-13 RX ORDER — ATORVASTATIN CALCIUM 80 MG/1
80 TABLET, FILM COATED ORAL DAILY
Qty: 90 TABLET | Refills: 3 | Status: SHIPPED | OUTPATIENT
Start: 2022-12-13

## 2022-12-13 RX ORDER — FUROSEMIDE 20 MG/1
20 TABLET ORAL DAILY
Qty: 90 TABLET | Refills: 3 | Status: SHIPPED | OUTPATIENT
Start: 2022-12-13

## 2022-12-13 NOTE — PROGRESS NOTES
Subjective   Barby Sue is a 67 y.o. female     Chief Complaint   Patient presents with   • Follow-up       HPI    Problem List:    1. Coronary artery disease  1.1 left heart cath 3/20/12-20% right coronary artery, circumflex 50-70% proximal, 20-30% first diagonal, EF 60%  1.2 Follow up stress August 2015 demonstrated chest wall attenuation but no sign of ischemia, preserved ER  1.3 Stress Test 12/14/16 - no ischemia; low risk  1.4 stress test 3/20/19-possible inferior basilar ischemia, preserved post stress EF of 83%, increased transient ischemic dilation ratio of 0.43  1.5 [facility] 4/22/19 -stent proximal circumflex, EF 55 to 60%, LVEDP 16-18, no aortic valve gradient present on catheter pullback  1.6 stress test 4/12/2021-no evidence of ischemia, post-rest EF 65%, mildly elevated transischemic dilation ratio of unknown significance  2. PVD with history of aortobifemoral bypass 2014  2.1 BLE Arterial U/S 12/14/16 - < or equal to 50% right common femoral artery; high-grade stenosis left posterior tibial artery 50-75% stenosis  2.2 CTA Abd with runoff 5/16/17 - patent aorta bi-iliac graft, mild narrowing seen at prox aspect of the celiac trunk.  No high grade stenosis. Fatty infiltration of the liver.   3. Chronic hypotension   4. Hyperlipidemia   5. Carotid Artery Disease   5.1 CTA head 2/15/19 - left external carotid artery is occluded, no sig stenosis KENNEDY; left vertebral artery at arch is near occluded; enlarged precarinal node 1.4 x 1.5 and right of the midline measuring 1.1 x 1.6, emphysema, per patient Neuroradiologist stated not blocked as indicated in CT  5.2 carotid artery ultrasound 4/12/2021- 50 to 69% stenosis by Doppler in the distal right internal carotid artery and mid and distal left internal carotid artery, antegrade flow both vertebral arteries  6. Echo 12/14/16 - mild LVH; EF > 65%; DD I; trace MR; physiological TR; mild WI  6.1 echo 3/20/19-mild LVH, EF 61-65%, diastolic dysfunction 1  6.2 Echo  4/12/2021-EF 55 to 60%, diastolic dysfunction 1, trivial AI and trivial MR  7. COPD/Emphysema, Dr. Garcia using O2 2L NC @ HS    8. Palpitations  8.1 Event Monitor 3/31-4/11/17 - NSR - ST  8.2 event monitor 3/10-3/23/2021-normal sinus rhythm    Patient is a 67-year-old female who presents today for a follow-up.  She denies any chest pain or pressure.  She says almost every night her heart will race when she goes to lay down like she is ran a marathon.  She denies any dizziness, presyncope, syncope, orthopnea or PND.  She gets some swelling but she says it is very rare.  She almost does not have to use her Lasix at all.  She does get shortness of breath even with sitting around but she says this has not changed.  She does get fatigued easily.  She has not had any recent blood work.    Current Outpatient Medications on File Prior to Visit   Medication Sig Dispense Refill   • Acetaminophen (TYLENOL ARTHRITIS PAIN PO) Take 1 tablet by mouth Every 8 (Eight) Hours As Needed.     • aspirin 81 MG EC tablet Take 81 mg by mouth Daily.     • Biotin 1000 MCG tablet Take 1 tablet by mouth daily.     • BLACK COHOSH PO Take 1,000 mg by mouth daily.     • cyclobenzaprine (FLEXERIL) 10 MG tablet Take 1 tablet by mouth 3 (three) times a day.     • ezetimibe (ZETIA) 10 MG tablet TAKE 1 TABLET BY MOUTH DAILY. 90 tablet 3   • fluticasone (FLONASE) 50 MCG/ACT nasal spray   3   • Fluticasone-Umeclidin-Vilant (TRELEGY ELLIPTA IN) Inhale.     • gabapentin (NEURONTIN) 400 MG capsule Take 400 mg by mouth 3 (Three) Times a Day.     • isosorbide mononitrate (IMDUR) 30 MG 24 hr tablet TAKE ONE TABLET BY MOUTH EVERY DAY 90 tablet 1   • losartan (COZAAR) 100 MG tablet TAKE ONE TABLET BY MOUTH EVERY DAY 90 tablet 1   • midodrine (PROAMATINE) 2.5 MG tablet TAKE ONE TABLET BY MOUTH THREE TIMES A  tablet 1   • nitroglycerin (NITROSTAT) 0.4 MG SL tablet PLACE 1 TABLET UNDER THE TONGUE EVERY 5 (FIVE) MINUTES AS NEEDED FOR CHEST PAIN. ONLY TAKE UP  TO 3 TABLETS. CALL 911 IF PAIN PERSISTS. 25 tablet 5   • oxyCODONE-acetaminophen (PERCOCET)  MG per tablet Take 1 tablet by mouth every 8 (eight) hours as needed for moderate pain (4-6).     • pantoprazole (PROTONIX) 40 MG EC tablet TAKE ONE TABLET BY MOUTH EVERY DAY 90 tablet 1   • [DISCONTINUED] atorvastatin (LIPITOR) 80 MG tablet TAKE ONE TABLET BY MOUTH EVERY DAY 90 tablet 1   • [DISCONTINUED] CloNIDine (CATAPRES) 0.1 MG tablet Take 1 tablet by mouth Every 12 (Twelve) Hours. 180 tablet 3   • [DISCONTINUED] clopidogrel (PLAVIX) 75 MG tablet TAKE ONE TABLET BY MOUTH EVERY DAY 90 tablet 1   • [DISCONTINUED] docusate sodium (COLACE) 100 MG capsule Take 100 mg by mouth As Needed.  5   • [DISCONTINUED] furosemide (LASIX) 20 MG tablet TAKE ONE TABLET BY MOUTH EVERY DAY 90 tablet 3   • [DISCONTINUED] hydroCHLOROthiazide (HYDRODIURIL) 12.5 MG tablet Take 1 tablet by mouth Every Other Day. Every other day 90 tablet 3   • [DISCONTINUED] metoprolol tartrate (LOPRESSOR) 100 MG tablet Take 1 tablet by mouth 2 (Two) Times a Day. 180 tablet 3   • [DISCONTINUED] potassium chloride (K-DUR,KLOR-CON) 20 MEQ CR tablet TAKE 1 CAPSULE BY MOUTH DAILY. 90 tablet 3   • [DISCONTINUED] potassium chloride 10 MEQ CR tablet TAKE 2 TABLETS BY MOUTH EVERY  tablet 1   • [DISCONTINUED] escitalopram (LEXAPRO) 10 MG tablet Take 10 mg by mouth Daily.  0     No current facility-administered medications on file prior to visit.       ALLERGIES    Lisinopril, Adhesive tape, Ibuprofen, and Latex    Past Medical History:   Diagnosis Date   • Anxiety    • Back pain    • Carotid bruit    • Chest pain    • COPD (chronic obstructive pulmonary disease) ([facility])    • COVID-19 vaccine administered 1st - 03/08/2021    2nd - 04/05/2021 - Moderna ( Booster 12/27/2021 ) Moderna    • Dyslipidemia    • Fainting    • Functional murmur    • GERD (gastroesophageal reflux disease)    • Hypercholesterolemia    • Hyperlipidemia    • Hypertension    • Hypokalemia     • Orthostatic hypotension    • Orthostatic hypotension    • Peripheral vascular disease (HCC)    • Peripheral vascular disease (HCC)     PVD with history of aortobifemoral bypass   • Shortness of breath    • Sleep apnea        Social History     Socioeconomic History   • Marital status:    Tobacco Use   • Smoking status: Every Day     Packs/day: 0.50     Years: 40.00     Pack years: 20.00     Types: Cigarettes   • Smokeless tobacco: Never   • Tobacco comments:     Pt states she stopped smoking 3/14/19   Vaping Use   • Vaping Use: Never used   Substance and Sexual Activity   • Alcohol use: No   • Drug use: Never   • Sexual activity: Defer       Family History   Problem Relation Age of Onset   • Heart failure Mother         Congestive   • Hypertension Mother    • Heart failure Sister         Congestive   • Coronary artery disease Sister    • Heart attack Sister         Acute   • Hypertension Sister    • Sudden death Sister    • Coronary artery disease Brother    • Seizures Brother    • Hypertension Brother    • Heart failure Maternal Grandmother         Congestive       Review of Systems   Constitutional: Positive for fatigue (fatgiued easier ). Negative for appetite change, chills, diaphoresis and fever.   HENT: Negative for congestion, rhinorrhea and sore throat.    Eyes: Positive for visual disturbance (glasses).   Respiratory: Positive for shortness of breath (with any type of activity , she will get sob just sitting around ). Negative for cough, chest tightness and wheezing.    Cardiovascular: Positive for palpitations (fluttering, races and pounds ( worse at night ) she states that it races when she lays down; every night ) and leg swelling (legs, feet and ankles ( at times ) swelling will go down when they swell; very rare). Negative for chest pain.   Gastrointestinal: Negative for abdominal pain, blood in stool, constipation, diarrhea, nausea and vomiting.   Endocrine: Negative for cold intolerance  "and heat intolerance.   Genitourinary: Positive for frequency (several times a day and night due to pt states that she drinks alot of water ). Negative for difficulty urinating, dysuria, hematuria and urgency.   Musculoskeletal: Positive for arthralgias (throughout the body ), back pain (chronic upper and lower back ) and joint swelling (due arthritis flare ups ). Negative for neck pain and neck stiffness.   Skin: Negative for color change, pallor, rash and wound.   Allergic/Immunologic: Negative for environmental allergies and food allergies.   Neurological: Positive for numbness (hips, legs, back ). Negative for dizziness, syncope, weakness, light-headedness and headaches.   Hematological: Bruises/bleeds easily (Brusies and bleeds easy ).   Psychiatric/Behavioral: Positive for sleep disturbance (hard to go and hard to stay asleep due to alot of arthritis pain ).       Objective   /82 (BP Location: Left arm, Patient Position: Sitting)   Pulse 87   Temp 98.2 °F (36.8 °C)   Ht 156.2 cm (61.5\")   Wt 84.4 kg (186 lb)   SpO2 95%   BMI 34.58 kg/m²   Vitals:    12/13/22 1020   BP: 153/82   BP Location: Left arm   Patient Position: Sitting   Pulse: 87   Temp: 98.2 °F (36.8 °C)   SpO2: 95%   Weight: 84.4 kg (186 lb)   Height: 156.2 cm (61.5\")      Lab Results (most recent)     None        Physical Exam  Vitals reviewed.   Constitutional:       General: She is awake.      Appearance: Normal appearance. She is well-developed and well-groomed. She is obese.   HENT:      Head: Normocephalic.      Right Ear: Decreased hearing noted.      Left Ear: Decreased hearing noted.      Ears:      Comments: josey hearing aids  Eyes:      General: Lids are normal.      Comments: Wears glasses    Neck:      Vascular: No carotid bruit, hepatojugular reflux or JVD.   Cardiovascular:      Rate and Rhythm: Normal rate and regular rhythm.      Pulses:           Radial pulses are 2+ on the right side and 2+ on the left side.        " Dorsalis pedis pulses are 2+ on the right side and 2+ on the left side.        Posterior tibial pulses are 2+ on the right side and 2+ on the left side.      Heart sounds: Normal heart sounds.   Musculoskeletal:      Right lower leg: No edema.      Left lower leg: No edema.   Skin:     General: Skin is warm and dry.   Neurological:      Mental Status: She is alert and oriented to person, place, and time.   Psychiatric:         Attention and Perception: Attention and perception normal.         Mood and Affect: Mood and affect normal.         Speech: Speech normal.         Behavior: Behavior normal. Behavior is cooperative.         Thought Content: Thought content normal.         Cognition and Memory: Cognition and memory normal.         Judgment: Judgment normal.         Procedure     ECG 12 Lead    Date/Time: 12/13/2022 10:34 AM  Performed by: Felicita Velazco APRN  Authorized by: Felicita Velazco APRN   Comparison: compared with previous ECG from 3/1/2021  Similar to previous ECG  Rhythm: sinus rhythm  Rate: normal  BPM: 85  QRS axis: normal  Other findings: low voltage and T wave abnormality    Clinical impression: non-specific ECG                 Assessment & Plan      Diagnosis Plan   1. CAD in native artery  atorvastatin (LIPITOR) 80 MG tablet    clopidogrel (PLAVIX) 75 MG tablet    ECG 12 Lead      2. Primary hypertension  cloNIDine (CATAPRES) 0.1 MG tablet    hydroCHLOROthiazide (HYDRODIURIL) 12.5 MG tablet    metoprolol tartrate (LOPRESSOR) 100 MG tablet    ECG 12 Lead    Comprehensive Metabolic Panel    CBC & Differential      3. Bilateral carotid artery stenosis        4. Dyslipidemia  atorvastatin (LIPITOR) 80 MG tablet    Lipid Panel      5. Grade I diastolic dysfunction        6. Orthostatic hypotension        7. Peripheral vascular disease (HCC)  atorvastatin (LIPITOR) 80 MG tablet      8. Stenosis of left vertebral artery  atorvastatin (LIPITOR) 80 MG tablet      9. Shortness of breath        10.  Peripheral edema  furosemide (LASIX) 20 MG tablet    potassium chloride (K-DUR,KLOR-CON) 20 MEQ CR tablet      11. Diuretic-induced hypokalemia        12. Palpitations  ECG 12 Lead    TSH    T3, Free    T4, Free    Magnesium    Holter Monitor - 48 Hour          Return in about 12 weeks (around 3/7/2023).    CAD-patient is on aspirin, statin and beta.  Hypertension-patient is on hydrochlorothiazide, losartan, Toprol.  Carotid artery disease-patient's on aspirin and statin as well as Plavix.  Dyslipidemia-patient is on Lipitor and Zetia.  Diastolic dysfunction/peripheral edema-patient is Lasix as needed.  Orthostatic-stable.  PVD-patient's on aspirin and statin.  Vertebral artery stenosis-patient's on aspirin and statin and Plavix.  Shortness of breath-stable.  Diuretic induced hypokalemia-patient's on potassium.  Palpitations-patient will wear Holter monitor for 48 hours.  She will get a TSH, free T3, free T4 and magnesium.  She will also get a CMP, CBC and lipid.  She will do this at her PCP.  She will continue her medication regimen.  She will follow-up in 12 weeks or sooner if any changes or abnormalities on monitor.       Barby Sue  reports that she has been smoking cigarettes. She has a 20.00 pack-year smoking history. She has never used smokeless tobacco.. I have educated her on the risk of diseases from using tobacco products such as cancer, COPD and heart disease.     I advised her to quit and she is not willing to quit.    I spent 3  minutes counseling the patient.         Advance Care Planning   ACP discussion was declined by the patient. Patient does not have an advance directive, declines further assistance. Patient did not bring med list or medicine bottles to appointment, med list has been reviewed and updated based on patient's knowledge of their meds.          Electronically signed by:

## 2023-01-16 ENCOUNTER — TELEPHONE (OUTPATIENT)
Dept: CARDIOLOGY | Facility: CLINIC | Age: 68
End: 2023-01-16
Payer: MEDICARE

## 2023-01-16 DIAGNOSIS — K21.9 GASTROESOPHAGEAL REFLUX DISEASE: ICD-10-CM

## 2023-01-16 RX ORDER — PANTOPRAZOLE SODIUM 40 MG/1
TABLET, DELAYED RELEASE ORAL
Qty: 90 TABLET | Refills: 11 | Status: SHIPPED | OUTPATIENT
Start: 2023-01-16

## 2023-01-16 NOTE — TELEPHONE ENCOUNTER
----- Message from OSCAR Martinez sent at 1/16/2023  1:47 PM EST -----  Please advise patient that her hemoglobin, hematocrit and platelets are within normal range.  Her creatinine and potassium also are within normal range as well as her liver enzymes.  Her LDL was excellent at 70.8 and triglycerides were just slightly high at 176.  She needs to watch her breads, pastas, carbs, sweets.  PCP already has copy of results.        Called and advised pt of the above mess/lab results    Pt advised that due to elevated Triglycerides that she will need to watch breads, pasta , carbs and sweets and pt aware of f/up apt 04/10/2023    SHANKAR Sims    Labs are scanned into chart

## 2023-02-07 RX ORDER — HYDROCHLOROTHIAZIDE 12.5 MG/1
CAPSULE, GELATIN COATED ORAL
Qty: 90 CAPSULE | Refills: 3 | OUTPATIENT
Start: 2023-02-07

## 2023-02-24 ENCOUNTER — TELEPHONE (OUTPATIENT)
Dept: CARDIOLOGY | Facility: CLINIC | Age: 68
End: 2023-02-24
Payer: MEDICARE

## 2023-02-24 NOTE — TELEPHONE ENCOUNTER
ATTEMPTED TO CALL RESULTS LISTED IN RESULTS SECTION OF  MONITOR, BUT CHILD ANSWERED A PHONE AND WAS TOLD TO TELL ME THEY WOULD CALL THE OFFICE BACK. OLI,JUSN          ----- Message from Lucia Ortiz sent at 2/23/2023  5:23 PM EST -----      ----- Message -----  From: Felicita Velazco APRN  Sent: 2/23/2023   6:26 AM EST  To: LULU Campbell    Please advise patient

## 2023-02-28 ENCOUNTER — TELEPHONE (OUTPATIENT)
Dept: CARDIOLOGY | Facility: CLINIC | Age: 68
End: 2023-02-28
Payer: MEDICARE

## 2023-02-28 NOTE — TELEPHONE ENCOUNTER
HOLTER  Called patient to notify of no acute findings or abnormalities. Keep follow up as scheduled. If you have any problem between now and then give our office a call.   ----- Message from OSCAR Martinez sent at 2/23/2023  6:26 AM EST -----  Please advise patient

## 2023-03-10 DIAGNOSIS — I25.10 CAD IN NATIVE ARTERY: ICD-10-CM

## 2023-03-10 DIAGNOSIS — I10 ESSENTIAL HYPERTENSION: ICD-10-CM

## 2023-03-10 DIAGNOSIS — I95.1 HYPOTENSION, POSTURAL: ICD-10-CM

## 2023-03-10 RX ORDER — ISOSORBIDE MONONITRATE 30 MG/1
TABLET, EXTENDED RELEASE ORAL
Qty: 90 TABLET | Refills: 1 | Status: SHIPPED | OUTPATIENT
Start: 2023-03-10

## 2023-03-10 RX ORDER — MIDODRINE HYDROCHLORIDE 2.5 MG/1
TABLET ORAL
Qty: 270 TABLET | Refills: 1 | Status: SHIPPED | OUTPATIENT
Start: 2023-03-10

## 2023-03-10 RX ORDER — LOSARTAN POTASSIUM 100 MG/1
TABLET ORAL
Qty: 90 TABLET | Refills: 1 | Status: SHIPPED | OUTPATIENT
Start: 2023-03-10

## 2023-08-18 DIAGNOSIS — E78.00 HYPERCHOLESTEROLEMIA: ICD-10-CM

## 2023-08-18 DIAGNOSIS — I65.02 STENOSIS OF LEFT VERTEBRAL ARTERY: ICD-10-CM

## 2023-08-18 DIAGNOSIS — I65.23 BILATERAL CAROTID ARTERY STENOSIS: ICD-10-CM

## 2023-08-18 DIAGNOSIS — I10 ESSENTIAL HYPERTENSION: ICD-10-CM

## 2023-08-18 DIAGNOSIS — I25.10 CAD IN NATIVE ARTERY: ICD-10-CM

## 2023-08-18 DIAGNOSIS — I95.1 HYPOTENSION, POSTURAL: ICD-10-CM

## 2023-08-22 RX ORDER — EZETIMIBE 10 MG/1
10 TABLET ORAL DAILY
Qty: 90 TABLET | Refills: 3 | Status: SHIPPED | OUTPATIENT
Start: 2023-08-22

## 2023-08-22 RX ORDER — LOSARTAN POTASSIUM 100 MG/1
TABLET ORAL
Qty: 90 TABLET | Refills: 1 | Status: SHIPPED | OUTPATIENT
Start: 2023-08-22

## 2023-08-22 RX ORDER — ISOSORBIDE MONONITRATE 30 MG/1
TABLET, EXTENDED RELEASE ORAL
Qty: 90 TABLET | Refills: 1 | Status: SHIPPED | OUTPATIENT
Start: 2023-08-22

## 2023-08-22 RX ORDER — MIDODRINE HYDROCHLORIDE 2.5 MG/1
TABLET ORAL
Qty: 270 TABLET | Refills: 1 | Status: SHIPPED | OUTPATIENT
Start: 2023-08-22

## 2023-09-12 DIAGNOSIS — I25.10 CAD IN NATIVE ARTERY: ICD-10-CM

## 2023-09-12 DIAGNOSIS — R07.2 PRECORDIAL PAIN: ICD-10-CM

## 2023-09-12 RX ORDER — NITROGLYCERIN 0.4 MG/1
0.4 TABLET SUBLINGUAL
Qty: 25 TABLET | Refills: 5 | Status: SHIPPED | OUTPATIENT
Start: 2023-09-12

## 2023-09-12 NOTE — TELEPHONE ENCOUNTER
Caller: [facility]    Relationship: Provider    Best call back number: [phone]     Requested Prescriptions:   Requested Prescriptions     Pending Prescriptions Disp Refills    nitroglycerin (NITROSTAT) 0.4 MG SL tablet 25 tablet 5     Sig: Place 1 tablet under the tongue Every 5 (Five) Minutes As Needed for Chest Pain. Only take up to 3 tablets. Call 911 if pain persists.        Pharmacy where request should be sent: [facility] - [place], [place] - [address] - [phone]  - [phone]     Last office visit with prescribing clinician: Visit date not found   Last telemedicine visit with prescribing clinician: Visit date not found   Next office visit with prescribing clinician: 9/18/2023     Does the patient have less than a 3 day supply:  [x] Yes  [] No    Would you like a call back once the refill request has been completed: [] Yes [] No    If the office needs to give you a call back, can they leave a voicemail: [] Yes [] No    Lucia Orr Rep   09/12/23 15:24 EDT

## 2023-09-18 ENCOUNTER — OFFICE VISIT (OUTPATIENT)
Dept: CARDIOLOGY | Facility: CLINIC | Age: 68
End: 2023-09-18
Payer: MEDICARE

## 2023-09-18 VITALS
DIASTOLIC BLOOD PRESSURE: 97 MMHG | HEART RATE: 91 BPM | BODY MASS INDEX: 34.04 KG/M2 | OXYGEN SATURATION: 93 % | SYSTOLIC BLOOD PRESSURE: 165 MMHG | HEIGHT: 62 IN | WEIGHT: 185 LBS

## 2023-09-18 DIAGNOSIS — I73.9 PVD (PERIPHERAL VASCULAR DISEASE) WITH CLAUDICATION: ICD-10-CM

## 2023-09-18 DIAGNOSIS — R06.02 SHORTNESS OF BREATH: ICD-10-CM

## 2023-09-18 DIAGNOSIS — I25.10 CAD IN NATIVE ARTERY: Primary | ICD-10-CM

## 2023-09-18 DIAGNOSIS — I65.23 BILATERAL CAROTID ARTERY STENOSIS: ICD-10-CM

## 2023-09-18 PROCEDURE — 3080F DIAST BP >= 90 MM HG: CPT | Performed by: PHYSICIAN ASSISTANT

## 2023-09-18 PROCEDURE — 3077F SYST BP >= 140 MM HG: CPT | Performed by: PHYSICIAN ASSISTANT

## 2023-09-18 PROCEDURE — 1159F MED LIST DOCD IN RCRD: CPT | Performed by: PHYSICIAN ASSISTANT

## 2023-09-18 PROCEDURE — 99214 OFFICE O/P EST MOD 30 MIN: CPT | Performed by: PHYSICIAN ASSISTANT

## 2023-09-18 PROCEDURE — 1160F RVW MEDS BY RX/DR IN RCRD: CPT | Performed by: PHYSICIAN ASSISTANT

## 2023-09-18 RX ORDER — TRAZODONE HYDROCHLORIDE 50 MG/1
50 TABLET ORAL NIGHTLY
COMMUNITY
Start: 2023-08-18

## 2023-09-18 RX ORDER — LIFITEGRAST 50 MG/ML
1 SOLUTION/ DROPS OPHTHALMIC 2 TIMES DAILY
COMMUNITY
Start: 2023-09-06

## 2023-09-18 NOTE — PROGRESS NOTES
Problem list     Subjective   Barby Sue is a 68 y.o. female     Chief Complaint   Patient presents with    Follow-up     9 month follow up   Problem list:  1.  Coronary artery disease.  1.1.  Left heart catheterization, 3/2012, supporting diffuse but nonobstructive disease.  Medical management was recommended.  1.2.  Repeat catheterization in the setting of abnormal stress test findings, 4/2019.  The patient had stenting of the proximal circumflex.  By report, nonobstructive disease was noted otherwise.  Again medical management was recommended in that regard.  2.  Peripheral vascular disease.  2.1.  Aortobifemoral bypass, 2014.  2.2.  Repeat CTA, 5/17, suggesting patent graft with nonobstructive disease otherwise.  2.3.  Carotid artery disease with 50 to 69% disease in the distal right internal carotid artery and distal left internal carotid artery per report, 4/2021.  3.  Preserved systolic function.  4.  Hypertension.  5.  Dyslipidemia.    HPI  The patient presents in the clinic today for routine evaluation and follow-up.  For the most part, the patient has done fairly well since last evaluation.  She does note some degree of dyspnea and fatigue, which have been chronic symptoms.  She denies chest pain specifically.  She apparently has lower extremity discomfort at times, nothing really of significance.  She notes this with ambulation and can limit activity at the higher levels.  She denies PND nor orthopnea.  She has rare palpitations but no sustained dysrhythmic activity.  She has some degree of dizziness but never presyncope or syncope.  She has no further complaints otherwise and feels that mostly she is doing reasonably well.    Current Outpatient Medications on File Prior to Visit   Medication Sig Dispense Refill    Acetaminophen (TYLENOL ARTHRITIS PAIN PO) Take 1 tablet by mouth Every 8 (Eight) Hours As Needed.      aspirin 81 MG EC tablet Take 1 tablet by mouth Daily.      atorvastatin (LIPITOR) 80  MG tablet Take 1 tablet by mouth Daily. 90 tablet 3    Biotin 1000 MCG tablet Take 1 tablet by mouth daily.      BLACK COHOSH PO Take 1,000 mg by mouth daily.      cloNIDine (CATAPRES) 0.1 MG tablet Take 1 tablet by mouth Every 12 (Twelve) Hours. 180 tablet 3    clopidogrel (PLAVIX) 75 MG tablet Take 1 tablet by mouth Daily. 90 tablet 3    cyclobenzaprine (FLEXERIL) 10 MG tablet Take 1 tablet by mouth 3 (Three) Times a Day.      ezetimibe (ZETIA) 10 MG tablet TAKE 1 TABLET BY MOUTH DAILY. 90 tablet 3    fluticasone (FLONASE) 50 MCG/ACT nasal spray   3    Fluticasone-Umeclidin-Vilant (TRELEGY ELLIPTA IN) Inhale.      furosemide (LASIX) 20 MG tablet Take 1 tablet by mouth Daily. 90 tablet 3    gabapentin (NEURONTIN) 400 MG capsule Take 1 capsule by mouth 3 (Three) Times a Day.      hydroCHLOROthiazide (HYDRODIURIL) 12.5 MG tablet Take 1 tablet by mouth Every Other Day. Every other day 90 tablet 3    isosorbide mononitrate (IMDUR) 30 MG 24 hr tablet TAKE ONE TABLET BY MOUTH EVERY DAY 90 tablet 1    losartan (COZAAR) 100 MG tablet TAKE ONE TABLET BY MOUTH EVERY DAY 90 tablet 1    metoprolol tartrate (LOPRESSOR) 100 MG tablet Take 1 tablet by mouth 2 (Two) Times a Day. 180 tablet 3    midodrine (PROAMATINE) 2.5 MG tablet TAKE ONE TABLET BY MOUTH THREE TIMES A  tablet 1    nitroglycerin (NITROSTAT) 0.4 MG SL tablet Place 1 tablet under the tongue Every 5 (Five) Minutes As Needed for Chest Pain. Only take up to 3 tablets. Call 911 if pain persists. 25 tablet 5    O2 (OXYGEN) Inhale 2 L/min 1 (One) Time. Wears at night at 2 lpm via nc      oxyCODONE-acetaminophen (PERCOCET)  MG per tablet Take 1 tablet by mouth Every 8 (Eight) Hours As Needed for Moderate Pain.      pantoprazole (PROTONIX) 40 MG EC tablet TAKE ONE TABLET BY MOUTH EVERY DAY 90 tablet 11    potassium chloride (K-DUR,KLOR-CON) 20 MEQ CR tablet Take 1 tablet by mouth Daily As Needed (with Lasix). 90 tablet 3    traZODone (DESYREL) 50 MG tablet Take  1 tablet by mouth Every Night.      Xiidra 5 % ophthalmic solution Administer 1 drop to both eyes 2 (Two) Times a Day.       No current facility-administered medications on file prior to visit.       Lisinopril, Adhesive tape, Ibuprofen, and Latex    Past Medical History:   Diagnosis Date    Anxiety     Back pain     Carotid bruit     Chest pain     COPD (chronic obstructive pulmonary disease)     COVID-19 vaccine administered 1st - 03/08/2021    2nd - 04/05/2021 - Moderna ( Booster 12/27/2021 ) Moderna     Dyslipidemia     Fainting     Functional murmur     GERD (gastroesophageal reflux disease)     Hypercholesterolemia     Hyperlipidemia     Hypertension     Hypokalemia     Orthostatic hypotension     Orthostatic hypotension     Peripheral vascular disease     Peripheral vascular disease     PVD with history of aortobifemoral bypass    Shortness of breath     Sleep apnea        Social History     Socioeconomic History    Marital status:    Tobacco Use    Smoking status: Every Day     Packs/day: 0.50     Years: 40.00     Pack years: 20.00     Types: Cigarettes    Smokeless tobacco: Never    Tobacco comments:     Pt states she stopped smoking 3/14/19   Vaping Use    Vaping Use: Never used   Substance and Sexual Activity    Alcohol use: No    Drug use: Never    Sexual activity: Defer       Family History   Problem Relation Age of Onset    Heart failure Mother         Congestive    Hypertension Mother     Heart failure Sister         Congestive    Coronary artery disease Sister     Heart attack Sister         Acute    Hypertension Sister     Sudden death Sister     Coronary artery disease Brother     Seizures Brother     Hypertension Brother     Heart failure Maternal Grandmother         Congestive       Review of Systems   Constitutional: Negative.  Negative for chills, diaphoresis, fatigue and fever.   HENT:  Positive for ear discharge and ear pain.    Eyes: Negative.  Negative for visual disturbance (wears  "glasses).   Respiratory:  Positive for cough, shortness of breath and wheezing. Negative for apnea and chest tightness.    Cardiovascular:  Positive for palpitations and leg swelling (resolves with lasix). Negative for chest pain.   Gastrointestinal: Negative.  Negative for abdominal pain and blood in stool.   Endocrine: Negative.  Negative for cold intolerance and heat intolerance.   Genitourinary: Negative.  Negative for hematuria.   Musculoskeletal:  Positive for arthralgias (hands, shoulder, elbow), back pain, neck pain and neck stiffness. Negative for myalgias.   Skin: Negative.  Negative for rash and wound.   Allergic/Immunologic: Positive for environmental allergies. Negative for food allergies.   Neurological:  Positive for numbness (fingers on right hand). Negative for dizziness, syncope, weakness, light-headedness and headaches.   Hematological:  Bruises/bleeds easily (bruises).   Psychiatric/Behavioral: Negative.  Negative for sleep disturbance.      Objective   Vitals:    09/18/23 1539 09/18/23 1546   BP: (!) 188/98 165/97   BP Location: Left arm Left arm   Patient Position: Sitting Sitting   Cuff Size: Adult Adult   Pulse: 90 91   SpO2: 93%    Weight: 83.9 kg (185 lb)    Height: 156.2 cm (61.5\")       /97 (BP Location: Left arm, Patient Position: Sitting, Cuff Size: Adult)   Pulse 91   Ht 156.2 cm (61.5\")   Wt 83.9 kg (185 lb)   SpO2 93%   BMI 34.39 kg/m²    Lab Results (most recent)       None          Physical Exam  Vitals and nursing note reviewed.   Constitutional:       General: She is not in acute distress.     Appearance: She is well-developed.   HENT:      Head: Normocephalic and atraumatic.   Eyes:      Conjunctiva/sclera: Conjunctivae normal.      Pupils: Pupils are equal, round, and reactive to light.   Neck:      Vascular: Carotid bruit (bilat) present. No JVD.      Trachea: No tracheal deviation.   Cardiovascular:      Rate and Rhythm: Normal rate and regular rhythm.      Pulses: "           Dorsalis pedis pulses are 1+ on the right side and 1+ on the left side.        Posterior tibial pulses are 1+ on the right side and 1+ on the left side.      Heart sounds: Normal heart sounds.      Comments: Soft systolic ejection murmur noted.  Pulmonary:      Effort: Pulmonary effort is normal.      Breath sounds: Normal breath sounds.   Abdominal:      General: Bowel sounds are normal. There is no distension.      Palpations: Abdomen is soft. There is no mass.      Tenderness: There is no abdominal tenderness. There is no guarding or rebound.   Musculoskeletal:         General: No tenderness or deformity. Normal range of motion.      Cervical back: Normal range of motion and neck supple.   Skin:     General: Skin is warm and dry.      Coloration: Skin is not pale.      Findings: No erythema or rash.   Neurological:      Mental Status: She is alert and oriented to person, place, and time.   Psychiatric:         Behavior: Behavior normal.         Thought Content: Thought content normal.         Judgment: Judgment normal.         Procedure   Procedures       Assessment & Plan      Diagnosis Plan   1. CAD in native artery  Adult Transthoracic Echo Complete W/ Cont if Necessary Per Protocol    Duplex Carotid Ultrasound CAR    Duplex Lower Extremity Art / Grafts - Bilateral CAR      2. Bilateral carotid artery stenosis  Adult Transthoracic Echo Complete W/ Cont if Necessary Per Protocol    Duplex Carotid Ultrasound CAR    Duplex Lower Extremity Art / Grafts - Bilateral CAR      3. PVD (peripheral vascular disease) with claudication  Adult Transthoracic Echo Complete W/ Cont if Necessary Per Protocol    Duplex Carotid Ultrasound CAR    Duplex Lower Extremity Art / Grafts - Bilateral CAR      4. Shortness of breath        1.  The patient presents back in for routine evaluation and follow-up.  Mostly, the patient is done fairly well since last evaluation.  She does have some degree of symptoms as above.  I would  like to repeat noninvasive cardiac testing at this time in this setting.    2.  We will schedule for an echo to evaluate LV size and function, valvular morphologies, and cardiac structure otherwise.    3.  With known carotid disease and soft carotid bruits bilaterally, we will schedule for carotid duplex.    4.  I would also schedule for lower extremity arterial duplex given known disease and mild claudication symptoms.    5.  The patient appears to be on appropriate medications for now.  I will make no adjustments in the same.    6.  She is hypertensive today.  I have asked that she monitor this closely at home and call for any ongoing issues.    7.  Further pending all the above.  If stable, we will see her on routine 6 to 9-month intervals.             Barby Sue  reports that she has been smoking cigarettes. She has a 20.00 pack-year smoking history. She has never used smokeless tobacco.. I have educated her on the risk of diseases from using tobacco products such as cancer, COPD, and heart disease.     I advised her to quit and she is not willing to quit.    I spent 3  minutes counseling the patient.          Advance Care Planning   ACP discussion was held with the patient during this visit. Patient does not have an advance directive, declines further assistance.                Electronically signed by:

## 2023-10-20 DIAGNOSIS — I25.10 CAD IN NATIVE ARTERY: ICD-10-CM

## 2023-10-20 DIAGNOSIS — I10 PRIMARY HYPERTENSION: ICD-10-CM

## 2023-10-20 DIAGNOSIS — I65.02 STENOSIS OF LEFT VERTEBRAL ARTERY: ICD-10-CM

## 2023-10-20 DIAGNOSIS — E78.5 DYSLIPIDEMIA: ICD-10-CM

## 2023-10-20 DIAGNOSIS — R60.9 PERIPHERAL EDEMA: ICD-10-CM

## 2023-10-20 DIAGNOSIS — I73.9 PERIPHERAL VASCULAR DISEASE: ICD-10-CM

## 2023-10-20 RX ORDER — POTASSIUM CHLORIDE 20 MEQ/1
20 TABLET, EXTENDED RELEASE ORAL DAILY PRN
Qty: 90 TABLET | Refills: 3 | Status: SHIPPED | OUTPATIENT
Start: 2023-10-20

## 2023-10-20 RX ORDER — ATORVASTATIN CALCIUM 80 MG/1
80 TABLET, FILM COATED ORAL DAILY
Qty: 90 TABLET | Refills: 3 | Status: SHIPPED | OUTPATIENT
Start: 2023-10-20

## 2023-10-20 RX ORDER — CLONIDINE HYDROCHLORIDE 0.1 MG/1
0.1 TABLET ORAL EVERY 12 HOURS SCHEDULED
Qty: 180 TABLET | Refills: 3 | Status: SHIPPED | OUTPATIENT
Start: 2023-10-20

## 2023-10-20 RX ORDER — CLOPIDOGREL BISULFATE 75 MG/1
75 TABLET ORAL DAILY
Qty: 90 TABLET | Refills: 3 | Status: SHIPPED | OUTPATIENT
Start: 2023-10-20

## 2023-10-25 ENCOUNTER — HOSPITAL ENCOUNTER (OUTPATIENT)
Dept: CARDIOLOGY | Facility: HOSPITAL | Age: 68
Discharge: HOME | End: 2023-10-25
Payer: MEDICARE

## 2023-10-25 DIAGNOSIS — I65.23 BILATERAL CAROTID ARTERY STENOSIS: ICD-10-CM

## 2023-10-25 DIAGNOSIS — I25.10 CAD IN NATIVE ARTERY: ICD-10-CM

## 2023-10-25 DIAGNOSIS — I73.9 PVD (PERIPHERAL VASCULAR DISEASE) WITH CLAUDICATION: ICD-10-CM

## 2023-10-25 LAB
BH CV ECHO MEAS - ACS: 1.78 CM
BH CV ECHO MEAS - AI P1/2T: 439 MSEC
BH CV ECHO MEAS - AO MAX PG: 8.2 MMHG
BH CV ECHO MEAS - AO MEAN PG: 5 MMHG
BH CV ECHO MEAS - AO ROOT DIAM: 2.8 CM
BH CV ECHO MEAS - AO V2 MAX: 143 CM/SEC
BH CV ECHO MEAS - AO V2 VTI: 28.7 CM
BH CV ECHO MEAS - EDV(CUBED): 66.4 ML
BH CV ECHO MEAS - EDV(MOD-SP4): 50.5 ML
BH CV ECHO MEAS - EF(MOD-SP4): 55.4 %
BH CV ECHO MEAS - EF_3D-VOL: 63 %
BH CV ECHO MEAS - ESV(CUBED): 10.6 ML
BH CV ECHO MEAS - ESV(MOD-SP4): 22.5 ML
BH CV ECHO MEAS - FS: 45.7 %
BH CV ECHO MEAS - IVS/LVPW: 0.98 CM
BH CV ECHO MEAS - IVSD: 1.43 CM
BH CV ECHO MEAS - LA DIMENSION: 4.1 CM
BH CV ECHO MEAS - LAT PEAK E' VEL: 6.1 CM/SEC
BH CV ECHO MEAS - LV DIASTOLIC VOL/BSA (35-75): 27.6 CM2
BH CV ECHO MEAS - LV MASS(C)D: 223.4 GRAMS
BH CV ECHO MEAS - LV SYSTOLIC VOL/BSA (12-30): 12.3 CM2
BH CV ECHO MEAS - LVIDD: 4.1 CM
BH CV ECHO MEAS - LVIDS: 2.2 CM
BH CV ECHO MEAS - LVPWD: 1.46 CM
BH CV ECHO MEAS - MED PEAK E' VEL: 4.7 CM/SEC
BH CV ECHO MEAS - MV A MAX VEL: 102 CM/SEC
BH CV ECHO MEAS - MV DEC TIME: 0.24 SEC
BH CV ECHO MEAS - MV E MAX VEL: 72 CM/SEC
BH CV ECHO MEAS - MV E/A: 0.71
BH CV ECHO MEAS - SI(MOD-SP4): 15.3 ML/M2
BH CV ECHO MEAS - SV(MOD-SP4): 28 ML
BH CV ECHO MEASUREMENTS AVERAGE E/E' RATIO: 13.33
BH CV LEA LEFT ANT TIBIAL A DISTAL PSV: 63 CM/S
BH CV LEA LEFT CFA PROX PSV: 179 CM/S
BH CV LEA LEFT DFA PROX PSV: 77 CM/S
BH CV LEA LEFT POPITEAL A  PROX PSV: 71 CM/S
BH CV LEA LEFT PTA DISTAL PSV: 110 CM/S
BH CV LEA LEFT SFA DISTAL PSV: -92 CM/S
BH CV LEA LEFT SFA MID PSV: -85.2 CM/S
BH CV LEA LEFT SFA PROX PSV: -121 CM/S
BH CV LEA RIGHT ANT TIBIAL A DISTAL PSV: 55 CM/S
BH CV LEA RIGHT CFA PROX PSV: 176 CM/S
BH CV LEA RIGHT DFA PROX PSV: 108 CM/S
BH CV LEA RIGHT POPITEAL A  PROX PSV: 84 CM/S
BH CV LEA RIGHT PTA DISTAL PSV: 113 CM/S
BH CV LEA RIGHT SFA DISTAL PSV: -94.4 CM/S
BH CV LEA RIGHT SFA MID PSV: -104 CM/S
BH CV LEA RIGHT SFA PROX PSV: -132 CM/S
BH CV XLRA - RV BASE: 2.9 CM
BH CV XLRA - RV LENGTH: 6.4 CM
BH CV XLRA - RV MID: 1.77 CM
BH CV XLRA MEAS LEFT DIST CCA EDV: 21.7 CM/SEC
BH CV XLRA MEAS LEFT DIST CCA PSV: 82 CM/SEC
BH CV XLRA MEAS LEFT DIST ICA EDV: -42.8 CM/SEC
BH CV XLRA MEAS LEFT DIST ICA PSV: -155 CM/SEC
BH CV XLRA MEAS LEFT ICA/CCA RATIO: 2
BH CV XLRA MEAS LEFT MID ICA EDV: -37.3 CM/SEC
BH CV XLRA MEAS LEFT MID ICA PSV: -164 CM/SEC
BH CV XLRA MEAS LEFT PROX CCA EDV: 20.3 CM/SEC
BH CV XLRA MEAS LEFT PROX CCA PSV: 108 CM/SEC
BH CV XLRA MEAS LEFT PROX ECA PSV: -148 CM/SEC
BH CV XLRA MEAS LEFT PROX ICA EDV: -25.6 CM/SEC
BH CV XLRA MEAS LEFT PROX ICA PSV: -91.9 CM/SEC
BH CV XLRA MEAS RIGHT DIST CCA EDV: 15.8 CM/SEC
BH CV XLRA MEAS RIGHT DIST CCA PSV: 58.4 CM/SEC
BH CV XLRA MEAS RIGHT DIST ICA EDV: -34.7 CM/SEC
BH CV XLRA MEAS RIGHT DIST ICA PSV: -107 CM/SEC
BH CV XLRA MEAS RIGHT ICA/CCA RATIO: 2
BH CV XLRA MEAS RIGHT MID ICA EDV: -36.8 CM/SEC
BH CV XLRA MEAS RIGHT MID ICA PSV: -115 CM/SEC
BH CV XLRA MEAS RIGHT PROX CCA EDV: 14 CM/SEC
BH CV XLRA MEAS RIGHT PROX CCA PSV: 60.6 CM/SEC
BH CV XLRA MEAS RIGHT PROX ECA PSV: -249 CM/SEC
BH CV XLRA MEAS RIGHT PROX ICA EDV: -42.6 CM/SEC
BH CV XLRA MEAS RIGHT PROX ICA PSV: -118 CM/SEC
BH CV XLRA MEAS RIGHT VERTEBRAL A EDV: 19.2 CM/SEC
BH CV XLRA MEAS RIGHT VERTEBRAL A PSV: 68 CM/SEC
LEFT ATRIUM VOLUME INDEX: 15.2 ML/M2
LEFT GROIN CFA SYS: 179 CM/SEC
RIGHT GROIN CFA SYS: 176 CM/SEC

## 2023-10-25 PROCEDURE — 93925 LOWER EXTREMITY STUDY: CPT

## 2023-10-25 PROCEDURE — 93880 EXTRACRANIAL BILAT STUDY: CPT

## 2023-10-25 PROCEDURE — 93306 TTE W/DOPPLER COMPLETE: CPT

## 2023-11-02 ENCOUNTER — TELEPHONE (OUTPATIENT)
Dept: CARDIOLOGY | Facility: CLINIC | Age: 68
End: 2023-11-02
Payer: MEDICARE

## 2023-11-02 DIAGNOSIS — I10 PRIMARY HYPERTENSION: ICD-10-CM

## 2023-11-02 DIAGNOSIS — I65.23 BILATERAL CAROTID ARTERY STENOSIS: Primary | ICD-10-CM

## 2023-11-02 NOTE — TELEPHONE ENCOUNTER
Patient notified of result's and recommendation's, patient reports has not had CTA of carotids, will place order, she is aware that someone will contact her with appointment date and time.

## 2023-11-02 NOTE — TELEPHONE ENCOUNTER
----- Message from TANMAY Lowery sent at 11/1/2023 10:17 PM EDT -----  Velocities are increased, but likely related more to vessel tortuosity.  If no carotid CTA in the past year, would recommend performing just to exclude significant disease.  ----- Message -----  From: Piyush Link MD  Sent: 11/1/2023   9:24 PM EDT  To: TANMAY Lowery    Duplex Carotid Ultrasound CAR  Order: [id number]  Status: Final result       Visible to patient: Yes (not seen)       Dx: PVD (peripheral vascular disease) wit...    0 Result Notes  Details    Reading Physician Reading Date Result Priority   Piyush Link MD  [phone] 11/1/2023 Routine     Result Text  1.  1.  Both common carotid arteries are without obstruction.  There is mild nonobstructive disease distally on the left.     2.  Moderate bifurcation disease bilaterally with 16 to 49% stenosis by Doppler on both sides.  There appears to be segmental relatively concentric plaque extending throughout the bulb into the internal carotid artery on the left with nearly 50% stenosis on echo imaging.     3.  16 to 49% stenosis by Doppler in the right internal carotid artery with mural calcification and fibrocalcific plaque proximally.  Fibrocalcific plaque is noted in the proximal left internal carotid artery with 16 to 49% stenosis at that level.  Systolic and diastolic flow velocities and ICA to CCA ratios are compatible with up to 50 to 69% stenosis in the mid and distal left internal carotid artery.     4.  Antegrade flow in both vertebral arteries.     Summary: Cannot exclude hemodynamically significant stenosis in the mid and distal left internal carotid artery by Doppler sampling.  It is suspected that increased flow velocities are due to vessel tortuosity.  Otherwise diffuse but noncritical disease in both carotid systems.  Antegrade flow in both vertebral arteries.  Of note, both external carotid arteries appear to have potentially significant  fibrocalcific plaque at the origins.

## 2023-11-02 NOTE — TELEPHONE ENCOUNTER
Called patient she was not home, left message with spouse for patient to return call.       Routine follow-up.          Previous Messages    PACS Images     Radiology Images  Adult Transthoracic Echo Complete W/ Cont if Necessary Per Protocol  Order: [id number]  Status: Final result       Visible to patient: Yes (not seen)       Dx: PVD (peripheral vascular disease) wit...    0 Result Notes  Details    Reading Physician Reading Date Result Priority   Piyush Link MD  [phone] 11/1/2023 Routine     Result Text       Left ventricular ejection fraction appears to be 56 - 60%.    Left ventricular wall thickness is consistent with mild concentric hypertrophy.    Left ventricular diastolic function is consistent with (grade Ia w/high LAP) impaired relaxation.    The left atrial cavity is mildly dilated.    There is mild, anterior mitral leaflet thickening present.     Technically adequate study.     1.  LV size, function, and wall motion are normal.  Mild concentric left ventricular hypertrophy.  Visually estimated ejection fraction is 60 to 65%.  3D ejection fraction is 63%.  Grade 1A diastolic dysfunction.  Mild left atrial enlargement.  Right heart chambers are normal.  No septal defect or intracavitary mass or thrombus.     2.  The anterior mitral leaflet is minimally sclerotic there is no mitral stenosis and there is trivial MR.  The aortic valve is tricuspid with minimal sclerosis of the noncoronary cusp.  Doppler excludes aortic stenosis and there is mild aortic insufficiency by color Doppler sampling.  Pressure half-time of 439 ms is most likely due to diastolic dysfunction rather than a large regurgitant fraction and therefore is felt not reflective of the degree of AI.  There is physiologic TR from a morphologically normal valve.     3.  No pericardial or great vessel pathology.     4.  Pulmonary artery pressures cannot be calculated.

## 2023-11-02 NOTE — TELEPHONE ENCOUNTER
----- Message from TANMAY Lowery sent at 11/1/2023 10:18 PM EDT -----  Routine follow-up.  Forward to PCP  ----- Message -----  From: Piyush Link MD  Sent: 11/1/2023   9:52 PM EDT  To: TANMAY Lowery    Duplex Lower Extremity Art / Grafts - Bilateral CAR  Order: [id number]  Status: Final result       Visible to patient: Yes (not seen)       Dx: PVD (peripheral vascular disease) wit...    0 Result Notes  Details    Reading Physician Reading Date Result Priority   Piyush Link MD  [phone] 11/1/2023 Routine     Result Text  1.  Both common femoral arteries have increased systolic flow velocities but appear to be without severe high-grade discrete obstruction.  There is concentric segmental disease on the left resulting in less than 50% stenosis.  The proximal portions of both profunda arteries are patent.     2.  The superficial femoral arteries demonstrate only minimal atherosclerotic change bilaterally with Doppler compatible with 20% or less or stenosis.     3.  The popliteal arteries are patent with mild atherosclerotic changes but no discrete high-grade stenosis.  There appears to be a Baker's cyst on the left.     4.  Distal vessels are patent were sampled.     Summary: No evidence of hemodynamically significant stenosis in either leg as detailed above.  Probable Bakers cyst on the left.

## 2023-11-14 ENCOUNTER — TELEPHONE (OUTPATIENT)
Dept: CARDIOLOGY | Facility: CLINIC | Age: 68
End: 2023-11-14
Payer: MEDICARE

## 2023-11-14 NOTE — TELEPHONE ENCOUNTER
Caller: Barby Sue    Relationship: Self    Best call back number: [phone]     What orders are you requesting (i.e. lab or imaging): CAROTID ARTERY TESTING    In what timeframe would the patient need to come in: UNKNOWN    Where will you receive your lab/imaging services: UNKNOWN    Additional notes: 2-3 WEEKS AGO PT HAD TEST DONE, SHE WAS TOLD SHE WOULD HAVE AN APPT TO HAVE A HER CAROTID ARTERY CHECKED. PT IS WONDERING WHEN SHE WILL BE CALLED ABOUT THIS/ SHCD FOR THIS TEST. PLEASE ADVISE.     WHEN THEY DID HER ULTRASOUND, THEY FOUND A BAKERS FIST BEHIND HER KNEE, WOULD LIKE TO KNOW IF SHE NEEDS TO CONTACT PCP ABOUT THAT.

## 2023-11-14 NOTE — TELEPHONE ENCOUNTER
"Relay     \"LEFT VOICEMAIL, CTA CAROTIDS IS PENDING AUTH WITH INSURANCE. AS SOON AS I GET AUTH I WILL MARCIAL AND CALL PT WITH APPT DATE AND TIME.\"      Lucia Madrid Rep            "

## 2023-12-07 ENCOUNTER — TELEPHONE (OUTPATIENT)
Dept: CARDIOLOGY | Facility: CLINIC | Age: 68
End: 2023-12-07
Payer: MEDICARE

## 2023-12-07 NOTE — TELEPHONE ENCOUNTER
Caller: VERN NEAL    Relationship: Child    Best call back number: [phone]    What is the best time to reach you: ANY    Who are you requesting to speak with (clinical staff, provider,  specific staff member): CLINICAL      What was the call regarding: PT DAUGHTER IS CALLING IN ON HER MOTHERS BEHALF. MOTHER RECEIVED A CALLED AND IS CONFUSED ON THE PURPOSE OF THE CALL, GOING THROUGH HER CHART, SAW A CTA ORDERED BY EMI AND WE THINK IT MIGHT HAVE BEEN SOMEONE GETTING THAT SCHEDULED, POSSIBLY FROM [facility]. IF POSSIBLE CAN WE REACH OUT TO THE PT'S DAUGHTER JUST TO CLARIFY IT WAS FOR THE CTA TO BE SCHEDULED. THANK YOU

## 2023-12-11 NOTE — TELEPHONE ENCOUNTER
"I SPOKE WITH THE PATIENT ON 11/29 AND WENT OVER INSTRUCTIONS FOR CTA CAROTID. THE NUMBER FOR THE DAUGHTER ISN'T A WORKING NUMBER AND SHE DOESN'T APPEAR TO BE ON HIPAA. IF PT EDWIN BACK Relay     \"CTA CAROTIDS scheduled at [facility] on 12/26/2023 @ 10:30 arrival time, NPO 3 hours prior to test. \"        Lucia Madrid Rep    "

## 2023-12-12 NOTE — TELEPHONE ENCOUNTER
Name: VERN NEAL      Relationship: Child      Best Callback Number: [phone]      HUB PROVIDED THE RELAY MESSAGE FROM THE OFFICE      PATIENT: VOICED UNDERSTANDING AND HAS NO FURTHER QUESTIONS AT THIS TIME    ADDITIONAL INFORMATION: ORIGINAL NUMBER FOR DAUGHTER WAS INCORRECT. UPDATED PHONE NUMBER AND DAUGHTER APPEARS TO BE ON [employer].

## 2024-01-02 ENCOUNTER — TELEPHONE (OUTPATIENT)
Dept: CARDIOLOGY | Facility: CLINIC | Age: 69
End: 2024-01-02
Payer: MEDICARE

## 2024-01-02 NOTE — TELEPHONE ENCOUNTER
Left VM to return call regarding results.      ----- Message from OSCAR Loving sent at 1/1/2024  3:10 PM EST -----  This does show evidence of external carotid stenosis which does not always require intervention like internal carotid stenosis.  Schedule the patient follow up with Chun to discuss further.

## 2024-01-03 ENCOUNTER — TELEPHONE (OUTPATIENT)
Dept: CARDIOLOGY | Facility: CLINIC | Age: 69
End: 2024-01-03
Payer: MEDICARE

## 2024-01-03 DIAGNOSIS — I65.23 BILATERAL CAROTID ARTERY STENOSIS: Primary | ICD-10-CM

## 2024-01-03 NOTE — TELEPHONE ENCOUNTER
Notified pt of her results and OSCAR Potts's recommendations. Pt verbalizes understanding and is agreeable to schedule appt with Chun to discuss CTA results.

## 2024-01-03 NOTE — TELEPHONE ENCOUNTER
----- Message from OSCAR Loving sent at 1/1/2024  3:10 PM EST -----  This does show evidence of external carotid stenosis which does not always require intervention like internal carotid stenosis.  Schedule the patient follow up with Chun to discuss further.

## 2024-01-03 NOTE — TELEPHONE ENCOUNTER
Per Chun Green pac have patient keep routine follow up appointment unless she is experiencing symptoms. If experiencing symptoms please come see me.     Called patient who reports numbness in hands, dizziness, balance is off and feet swelling a lot.     Will inform Chun Green pac of patient symptom's for further recommendation's.

## 2024-01-08 ENCOUNTER — TELEPHONE (OUTPATIENT)
Dept: CARDIOLOGY | Facility: CLINIC | Age: 69
End: 2024-01-08

## 2024-01-09 NOTE — TELEPHONE ENCOUNTER
Per Chun Green pac can refer to vascular at . Called patient no answer left voice message for patient to return call.

## 2024-01-31 ENCOUNTER — OFFICE VISIT (OUTPATIENT)
Dept: CARDIOLOGY | Facility: CLINIC | Age: 69
End: 2024-01-31
Payer: MEDICARE

## 2024-01-31 VITALS
OXYGEN SATURATION: 95 % | DIASTOLIC BLOOD PRESSURE: 92 MMHG | BODY MASS INDEX: 35.3 KG/M2 | HEART RATE: 83 BPM | HEIGHT: 61 IN | WEIGHT: 187 LBS | SYSTOLIC BLOOD PRESSURE: 167 MMHG

## 2024-01-31 DIAGNOSIS — I10 PRIMARY HYPERTENSION: ICD-10-CM

## 2024-01-31 DIAGNOSIS — I25.10 CAD IN NATIVE ARTERY: ICD-10-CM

## 2024-01-31 DIAGNOSIS — I73.9 PVD (PERIPHERAL VASCULAR DISEASE) WITH CLAUDICATION: ICD-10-CM

## 2024-01-31 DIAGNOSIS — I65.23 BILATERAL CAROTID ARTERY STENOSIS: Primary | ICD-10-CM

## 2024-01-31 NOTE — PROGRESS NOTES
Subjective     Barby Sue is a 68 y.o. female who presents today for Follow-up (CTA results).    CHIEF COMPLIANT  Chief Complaint   Patient presents with    Follow-up     CTA results       Active Problems:    1.  Coronary artery disease.  1.1.  Left heart catheterization, 3/2012, supporting diffuse but nonobstructive disease.  Medical management was recommended.  1.2.  Repeat catheterization in the setting of abnormal stress test findings, 4/2019.  The patient had stenting of the proximal circumflex.  By report, nonobstructive disease was noted otherwise.  Again medical management was recommended in that regard.  2.  Peripheral vascular disease.  2.1.  Aortobifemoral bypass, 2014.  2.2.  Repeat CTA, 5/17, suggesting patent graft with nonobstructive disease otherwise.  2.3.  Carotid artery disease with 50 to 69% disease in the distal right internal carotid artery and distal left internal carotid artery per report, 4/2021.  2.4 carotid CTA 12/26/2023: High-grade stenosis 90% or greater bilateral external carotid arteries.  Fairly high-grade stenosis left vertebral artery 80%.  Internal carotid less than 50% bilaterally.  3.  Preserved systolic function.  4.  Hypertension.  5.  Dyslipidemia.       HPI  Patient is a 68-year-old female that returns for follow-up to discuss recent carotid CTA results.  This showed high-grade stenosis of 90% or greater in the bilateral external carotid arteries and fairly high-grade stenosis in the left vertebral artery of 80%.  The patient typically follows with Chun Green PA-C and has been referred to Dr. Velazquez for further evaluation and treatment.  The patient does state that she has had some numbness and tingling in her face as well is ongoing dizziness.  Her blood pressure is elevated today.  In review of her medications we did discover that she is still taking midodrine along with her antihypertensives.  She denies chest pain, worsening shortness of air, syncope,  palpitations.  Her EKG shows normal sinus rhythm with some nonspecific ST changes.    PRIOR MEDS  Current Outpatient Medications on File Prior to Visit   Medication Sig Dispense Refill    aspirin 81 MG EC tablet Take 1 tablet by mouth Daily.      atorvastatin (LIPITOR) 80 MG tablet TAKE 1 TABLET BY MOUTH DAILY. 90 tablet 3    Biotin 1000 MCG tablet Take 1 tablet by mouth daily.      BLACK COHOSH PO Take 1,000 mg by mouth daily.      cloNIDine (CATAPRES) 0.1 MG tablet TAKE 1 TABLET BY MOUTH EVERY 12 (TWELVE) HOURS. 180 tablet 3    clopidogrel (PLAVIX) 75 MG tablet TAKE 1 TABLET BY MOUTH DAILY. 90 tablet 3    cyclobenzaprine (FLEXERIL) 10 MG tablet Take 1 tablet by mouth 3 (Three) Times a Day.      ezetimibe (ZETIA) 10 MG tablet TAKE 1 TABLET BY MOUTH DAILY. 90 tablet 3    fluticasone (FLONASE) 50 MCG/ACT nasal spray   3    Fluticasone-Umeclidin-Vilant (TRELEGY ELLIPTA IN) Inhale.      furosemide (LASIX) 20 MG tablet Take 1 tablet by mouth Daily. 90 tablet 3    gabapentin (NEURONTIN) 400 MG capsule Take 1 capsule by mouth 3 (Three) Times a Day.      hydroCHLOROthiazide (HYDRODIURIL) 12.5 MG tablet Take 1 tablet by mouth Every Other Day. Every other day 90 tablet 3    isosorbide mononitrate (IMDUR) 30 MG 24 hr tablet TAKE ONE TABLET BY MOUTH EVERY DAY 90 tablet 1    losartan (COZAAR) 100 MG tablet TAKE ONE TABLET BY MOUTH EVERY DAY 90 tablet 1    metoprolol tartrate (LOPRESSOR) 100 MG tablet Take 1 tablet by mouth 2 (Two) Times a Day. 180 tablet 3    nitroglycerin (NITROSTAT) 0.4 MG SL tablet Place 1 tablet under the tongue Every 5 (Five) Minutes As Needed for Chest Pain. Only take up to 3 tablets. Call 911 if pain persists. 25 tablet 5    O2 (OXYGEN) Inhale 2 L/min 1 (One) Time. Wears at night at 2 lpm via nc      oxyCODONE-acetaminophen (PERCOCET)  MG per tablet Take 1 tablet by mouth Every 8 (Eight) Hours As Needed for Moderate Pain.      pantoprazole (PROTONIX) 40 MG EC tablet TAKE ONE TABLET BY MOUTH EVERY  DAY 90 tablet 11    potassium chloride (K-DUR,KLOR-CON) 20 MEQ CR tablet TAKE 1 TABLET BY MOUTH DAILY AS NEEDED (WITH LASIX). 90 tablet 3    traZODone (DESYREL) 50 MG tablet Take 1 tablet by mouth Every Night.      Xiidra 5 % ophthalmic solution Administer 1 drop to both eyes 2 (Two) Times a Day.      [DISCONTINUED] Acetaminophen (TYLENOL ARTHRITIS PAIN PO) Take 1 tablet by mouth Every 8 (Eight) Hours As Needed.      [DISCONTINUED] midodrine (PROAMATINE) 2.5 MG tablet TAKE ONE TABLET BY MOUTH THREE TIMES A  tablet 1     No current facility-administered medications on file prior to visit.       ALLERGIES  Lisinopril, Adhesive tape, Ibuprofen, and Latex    HISTORY  Past Medical History:   Diagnosis Date    Anxiety     Aortic valve replaced     Arrhythmia     Back pain     Carotid bruit     Chest pain     COPD (chronic obstructive pulmonary disease)     COVID-19 vaccine administered 1st - 03/08/2021    2nd - 04/05/2021 - Moderna ( Booster 12/27/2021 ) Moderna     Dyslipidemia     Fainting     Functional murmur     GERD (gastroesophageal reflux disease)     Hypercholesterolemia     Hyperlipidemia     Hypertension     Hypokalemia     Orthostatic hypotension     Orthostatic hypotension     Peripheral vascular disease     Peripheral vascular disease     PVD with history of aortobifemoral bypass    Shortness of breath     Sleep apnea        Social History     Socioeconomic History    Marital status:    Tobacco Use    Smoking status: Every Day     Packs/day: 1.00     Years: 50.00     Additional pack years: 0.00     Total pack years: 50.00     Types: Cigarettes    Smokeless tobacco: Never    Tobacco comments:     Pt states she stopped smoking 3/14/19   Vaping Use    Vaping Use: Never used   Substance and Sexual Activity    Alcohol use: No    Drug use: Never    Sexual activity: Yes     Partners: Male     Birth control/protection: Post-menopausal, Hysterectomy       Family History   Problem Relation Age of Onset  "   Heart failure Mother         Congestive    Hypertension Mother     Heart failure Sister         Congestive    Coronary artery disease Sister     Heart attack Sister         Acute    Hypertension Sister     Sudden death Sister     Coronary artery disease Brother     Seizures Brother     Hypertension Brother     Heart failure Maternal Grandmother         Congestive       Review of Systems   Constitutional:  Positive for fatigue. Negative for chills, diaphoresis and fever.   HENT:          Sinus infection   Eyes:  Positive for visual disturbance (glasses).   Respiratory:  Positive for cough (sinus infection). Negative for apnea, chest tightness, shortness of breath and wheezing.         O2 2L at night prn    Cardiovascular:  Positive for palpitations (when laying down at night) and leg swelling. Negative for chest pain.   Gastrointestinal: Negative.  Negative for blood in stool.   Endocrine: Negative.  Negative for cold intolerance and heat intolerance.   Genitourinary: Negative.  Negative for hematuria.   Musculoskeletal:  Positive for arthralgias, back pain and myalgias (calves, arms and shoulders). Negative for neck pain and neck stiffness.   Skin:  Positive for color change (fingers cold and numb and turns white). Negative for rash and wound.   Allergic/Immunologic: Negative.  Negative for environmental allergies and food allergies.   Neurological:  Positive for numbness (hands and gets really cold and turns white and will have feet swelling). Negative for dizziness, syncope, weakness, light-headedness and headaches.   Hematological:  Bruises/bleeds easily.   Psychiatric/Behavioral: Negative.  Negative for sleep disturbance.        Objective     VITALS: /92 (BP Location: Right arm, Patient Position: Sitting)   Pulse 83   Ht 156.2 cm (61.5\")   Wt 84.8 kg (187 lb)   SpO2 95%   BMI 34.77 kg/m²     LABS:   Lab Results (most recent)       None            IMAGING:   No Images in the past 120 days " found..    EXAM:  Physical Exam  Constitutional:       Appearance: Normal appearance.   Eyes:      Pupils: Pupils are equal, round, and reactive to light.   Cardiovascular:      Rate and Rhythm: Normal rate and regular rhythm.      Pulses:           Carotid pulses are 2+ on the right side with bruit and 2+ on the left side with bruit.       Radial pulses are 2+ on the right side and 2+ on the left side.        Dorsalis pedis pulses are 2+ on the right side and 2+ on the left side.        Posterior tibial pulses are 2+ on the right side and 2+ on the left side.      Heart sounds: Murmur heard.   Pulmonary:      Effort: Pulmonary effort is normal.      Breath sounds: Normal breath sounds.   Abdominal:      General: Bowel sounds are normal.      Palpations: Abdomen is soft.   Musculoskeletal:      Right lower leg: No edema.      Left lower leg: No edema.   Skin:     General: Skin is warm and dry.      Capillary Refill: Capillary refill takes less than 2 seconds.   Neurological:      General: No focal deficit present.      Mental Status: She is alert and oriented to person, place, and time.   Psychiatric:         Mood and Affect: Mood normal.         Thought Content: Thought content normal.         Procedure   Procedures       Assessment & Plan    Diagnosis Plan   1. Bilateral carotid artery stenosis        2. Primary hypertension        3. CAD in native artery        4. PVD (peripheral vascular disease) with claudication          Plan:  1.  Bilateral carotid artery stenosis: The patient has already been referred to Dr. Velazquez for further evaluation and treatment.  Continue antiplatelet and statin.  2.  Primary hypertension: The patient's blood pressure is elevated at 167/92.  In review of her last 2 visits in the office her blood pressure has been elevated in the 150s to 160s.  The patient does monitor her blood pressure at home and states that it has been staying in this range at home.  Patient states that several  years ago she was having problems with low blood pressure but has not had this problem for quite some time.  We did review her medications and she has been taking midodrine 3 times daily.  I have asked her to hold this medication and to monitor her blood pressure closely.  She will bring a log to her next visit for us to review.  Continue to make adjustments to medications as needed.  3.  CAD: No current chest pain symptoms no worsening shortness of air.  Will continue medical management including isosorbide, losartan, Zetia, atorvastatin, Plavix and aspirin.  Metoprolol tartrate.  She was advised to notify our office if symptoms of chest pain or worsening shortness of air develop.  The patient does have sublingual nitro to use if needed and we did review instructions on appropriate use and when to seek emergency treatment.  4.  PVD: No current claudication symptoms.  Will continue antiplatelet and statin.    Return for Keep follow up as scheduled.    Barby Sue  reports that she has been smoking cigarettes. She has a 50.00 pack-year smoking history. She has never used smokeless tobacco.. I have educated her on the risk of diseases from using tobacco products such as cancer, COPD, and heart disease.     I advised her to quit and she is willing to quit. We have discussed the following method/s for tobacco cessation:  Education Material.    I spent 3  minutes counseling the patient.      Patient did not bring med list or medicine bottles to appointment, med list has been reviewed and updated based on patient's knowledge of their meds.            MEDS ORDERED DURING VISIT:  No orders of the defined types were placed in this encounter.      DISCONTINUED MEDS DURING VISIT:   Medications Discontinued During This Encounter   Medication Reason    Acetaminophen (TYLENOL ARTHRITIS PAIN PO) Patient Reported Not Taking    midodrine (PROAMATINE) 2.5 MG tablet           This document has been electronically signed by Coco BRASHER  OSCAR Browning  January 31, 2024 16:46 EST    Dictated Utilizing Dragon Dictation: Part of this note may be an electronic transcription/translation of spoken language to printed text using the Dragon Dictation System

## 2024-04-08 ENCOUNTER — TELEPHONE (OUTPATIENT)
Dept: CARDIOLOGY | Facility: CLINIC | Age: 69
End: 2024-04-08
Payer: MEDICARE

## 2024-04-08 DIAGNOSIS — I10 PRIMARY HYPERTENSION: Primary | ICD-10-CM

## 2024-04-08 NOTE — TELEPHONE ENCOUNTER
Caller: Barby Sue    Relationship to patient: Self    Best call back number: [phone]     Chief complaint: PATIENT HAS BEEN EXPERIENCING SWELLING ALL OVER HER BODY. SHE HAS BEEN TAKING LASIX AND POTASSIUM WITH NO RELIEF. THIS IS A NEW ISSUE FOR THE PATIENT. SHE IS REQUESTING A SOONER APPOINTMENT.    Type of visit: FOLLOW UP    If rescheduling, when is the original appointment: 05.16.24    PATIENT DOES NOT HAVE ANY SCHEDULING PREFERENCES.

## 2024-04-09 NOTE — TELEPHONE ENCOUNTER
Called patient who reports swelling has gone down has been drinking water, walking and took lasix and at this time feet have improved so has shortness of breath. Patient reports lasix is 20 mg 3 to 4 tablets daily in and potassium 20 meq daily. Patient also has hctz on medication list but while on phone with patient states does not know if taking or not medication not in her medication bag states may be out of it and will check with drug store. Patient reports since swelling has gone down can wait until may appointment. Will check with provider to see if he want's to order labs or has any other recommendation's, I have instructed patient that if symptom's worsen and experiences increase in swelling to contact our office to see if we can get her in sooner.

## 2024-05-13 DIAGNOSIS — I10 ESSENTIAL HYPERTENSION: ICD-10-CM

## 2024-05-13 RX ORDER — LOSARTAN POTASSIUM 100 MG/1
100 TABLET ORAL DAILY
Qty: 90 TABLET | Refills: 1 | Status: SHIPPED | OUTPATIENT
Start: 2024-05-13

## 2024-05-13 NOTE — TELEPHONE ENCOUNTER
Caller: SueBarby    Relationship: Self    Best call back number: [phone]    Requested Prescriptions:   Requested Prescriptions     Pending Prescriptions Disp Refills    losartan (COZAAR) 100 MG tablet 90 tablet 1     Sig: Take 1 tablet by mouth Daily.        Pharmacy where request should be sent: [address] - [phone] [facility] [phone] [facility]     Last office visit with prescribing clinician: 9/18/2023   Last telemedicine visit with prescribing clinician: Visit date not found   Next office visit with prescribing clinician: 5/16/2024         Does the patient have less than a 3 day supply:  [x] Yes  [] No    Would you like a call back once the refill request has been completed: [] Yes [x] No    If the office needs to give you a call back, can they leave a voicemail: [] Yes [x] No    Lucia Floyd Rep   05/13/24 11:45 EDT         
Two to four times a month

## 2024-06-12 DIAGNOSIS — E78.00 HYPERCHOLESTEROLEMIA: ICD-10-CM

## 2024-06-12 DIAGNOSIS — I65.02 STENOSIS OF LEFT VERTEBRAL ARTERY: ICD-10-CM

## 2024-06-12 DIAGNOSIS — I25.10 CAD IN NATIVE ARTERY: ICD-10-CM

## 2024-06-12 DIAGNOSIS — I65.23 BILATERAL CAROTID ARTERY STENOSIS: ICD-10-CM

## 2024-06-12 RX ORDER — EZETIMIBE 10 MG/1
10 TABLET ORAL DAILY
Qty: 90 TABLET | Refills: 1 | Status: SHIPPED | OUTPATIENT
Start: 2024-06-12

## 2024-06-26 ENCOUNTER — TELEPHONE (OUTPATIENT)
Dept: CARDIOLOGY | Facility: CLINIC | Age: 69
End: 2024-06-26

## 2024-06-26 ENCOUNTER — TELEPHONE (OUTPATIENT)
Dept: CARDIOLOGY | Facility: CLINIC | Age: 69
End: 2024-06-26
Payer: MEDICARE

## 2024-06-26 NOTE — TELEPHONE ENCOUNTER
Working on overdue results, pt is past due for labs as we have not received results or it has not been performed.     LVM for pt.       RELAY    One of our providers has lab orders in for pt, please ask pt to get fasting labs at earliest convenience.

## 2024-06-26 NOTE — TELEPHONE ENCOUNTER
Name: Sue Barby G      Relationship: Self      Best Callback Number: [phone] (home)         HUB PROVIDED THE RELAY MESSAGE FROM THE OFFICE      PATIENT: VOICED UNDERSTANDING AND HAS NO FURTHER QUESTIONS AT THIS TIME      PT UNDERSTANDS PREVIOUS TE REGARDING LABS.

## 2024-08-14 ENCOUNTER — TELEPHONE (OUTPATIENT)
Dept: CARDIOLOGY | Facility: CLINIC | Age: 69
End: 2024-08-14
Payer: MEDICARE

## 2024-08-14 NOTE — TELEPHONE ENCOUNTER
Called to verify if the pt had labs performed.  Per the pt she will have them performed prior to her appt with Coco on 8/20.  Orders extended to this date.

## 2024-08-20 ENCOUNTER — OFFICE VISIT (OUTPATIENT)
Dept: CARDIOLOGY | Facility: CLINIC | Age: 69
End: 2024-08-20
Payer: MEDICARE

## 2024-08-20 ENCOUNTER — LAB (OUTPATIENT)
Dept: LAB | Facility: HOSPITAL | Age: 69
End: 2024-08-20
Payer: MEDICARE

## 2024-08-20 VITALS
DIASTOLIC BLOOD PRESSURE: 91 MMHG | HEART RATE: 83 BPM | HEIGHT: 62 IN | WEIGHT: 176.2 LBS | BODY MASS INDEX: 32.42 KG/M2 | OXYGEN SATURATION: 95 % | SYSTOLIC BLOOD PRESSURE: 178 MMHG

## 2024-08-20 DIAGNOSIS — I25.10 CAD IN NATIVE ARTERY: ICD-10-CM

## 2024-08-20 DIAGNOSIS — I10 PRIMARY HYPERTENSION: ICD-10-CM

## 2024-08-20 DIAGNOSIS — I10 ESSENTIAL HYPERTENSION: ICD-10-CM

## 2024-08-20 DIAGNOSIS — I65.23 BILATERAL CAROTID ARTERY STENOSIS: Primary | ICD-10-CM

## 2024-08-20 LAB
ANION GAP SERPL CALCULATED.3IONS-SCNC: 12.4 MMOL/L (ref 5–15)
BUN SERPL-MCNC: 12 MG/DL (ref 8–23)
BUN/CREAT SERPL: 16.7 (ref 7–25)
CALCIUM SPEC-SCNC: 9.8 MG/DL (ref 8.6–10.5)
CHLORIDE SERPL-SCNC: 104 MMOL/L (ref 98–107)
CO2 SERPL-SCNC: 24.6 MMOL/L (ref 22–29)
CREAT SERPL-MCNC: 0.72 MG/DL (ref 0.57–1)
EGFRCR SERPLBLD CKD-EPI 2021: 91.8 ML/MIN/1.73
GLUCOSE SERPL-MCNC: 104 MG/DL (ref 65–99)
MAGNESIUM SERPL-MCNC: 1.9 MG/DL (ref 1.6–2.4)
POTASSIUM SERPL-SCNC: 4.1 MMOL/L (ref 3.5–5.2)
SODIUM SERPL-SCNC: 141 MMOL/L (ref 136–145)

## 2024-08-20 PROCEDURE — 80048 BASIC METABOLIC PNL TOTAL CA: CPT

## 2024-08-20 PROCEDURE — 3077F SYST BP >= 140 MM HG: CPT | Performed by: CLINICAL NURSE SPECIALIST

## 2024-08-20 PROCEDURE — 36415 COLL VENOUS BLD VENIPUNCTURE: CPT

## 2024-08-20 PROCEDURE — 99214 OFFICE O/P EST MOD 30 MIN: CPT | Performed by: CLINICAL NURSE SPECIALIST

## 2024-08-20 PROCEDURE — 3080F DIAST BP >= 90 MM HG: CPT | Performed by: CLINICAL NURSE SPECIALIST

## 2024-08-20 PROCEDURE — 83735 ASSAY OF MAGNESIUM: CPT

## 2024-08-20 RX ORDER — CARVEDILOL 6.25 MG/1
6.25 TABLET ORAL 2 TIMES DAILY
Qty: 180 TABLET | Refills: 3 | Status: SHIPPED | OUTPATIENT
Start: 2024-08-20

## 2024-08-20 RX ORDER — DIAZEPAM 2 MG/1
TABLET ORAL
COMMUNITY
Start: 2024-07-18

## 2024-08-20 NOTE — PATIENT INSTRUCTIONS
Advance Care Planning and Advance Directives     You make decisions on a daily basis - decisions about where you want to live, your career, your home, your life. Perhaps one of the most important decisions you face is your choice for future medical care. Take time to talk with your family and your healthcare team and start planning today.  Advance Care Planning is a process that can help you:  Understand possible future healthcare decisions in light of your own experiences  Reflect on those decision in light of your goals and values  Discuss your decisions with those closest to you and the healthcare professionals that care for you  Make a plan by creating a document that reflects your wishes    Surrogate Decision Maker  In the event of a medical emergency, which has left you unable to communicate or to make your own decisions, you would need someone to make decisions for you.  It is important to discuss your preferences for medical treatment with this person while you are in good health.     Qualities of a surrogate decision maker:  Willing to take on this role and responsibility  Knows what you want for future medical care  Willing to follow your wishes even if they don't agree with them  Able to make difficult medical decisions under stressful circumstances    Advance Directives  These are legal documents you can create that will guide your healthcare team and decision maker(s) when needed. These documents can be stored in the electronic medical record.    Living Will - a legal document to guide your care if you have a terminal condition or a serious illness and are unable to communicate. States vary by statute in document names/types, but most forms may include one or more of the following:        -  Directions regarding life-prolonging treatments        -  Directions regarding artificially provided nutrition/hydration        -  Choosing a healthcare decision maker        -  Direction regarding organ/tissue  donation    Durable Power of  for Healthcare - this document names an -in-fact to make medical decisions for you, but it may also allow this person to make personal and financial decisions for you. Please seek the advice of an  if you need this type of document.    **Advance Directives are not required and no one may discriminate against you if you do not sign one.    Medical Orders  Many states allow specific forms/orders signed by your physician to record your wishes for medical treatment in your current state of health. This form, signed in personal communication with your physician, addresses resuscitation and other medical interventions that you may or may not want.      For more information or to schedule a time with a [facility] Advance Care Planning Facilitator contact: [email or web address] or call [phone] and someone will contact you directly.

## 2024-08-20 NOTE — PROGRESS NOTES
Subjective     Barby Sue is a 69 y.o. female who presents today for Follow-up (Continual care for cardiac management - 6 months since last seen by Chun MOORE //Labs- no new to review //Medication - Patient did not bring med list or medicine bottles to appointment, med list has been reviewed and updated based on patient's knowledge of their meds.  ) and Hypertension.    CHIEF COMPLIANT  Chief Complaint   Patient presents with    Follow-up     Continual care for cardiac management - 6 months since last seen by Chun MOORE     Labs- no new to review     Medication - Patient did not bring med list or medicine bottles to appointment, med list has been reviewed and updated based on patient's knowledge of their meds.      Hypertension       Active Problems:  1.  Coronary artery disease.  1.1.  Left heart catheterization, 3/2012, supporting diffuse but nonobstructive disease.  Medical management was recommended.  1.2.  Repeat catheterization in the setting of abnormal stress test findings, 4/2019.  The patient had stenting of the proximal circumflex.  By report, nonobstructive disease was noted otherwise.  Again medical management was recommended in that regard.  2.  Peripheral vascular disease.  2.1.  Aortobifemoral bypass, 2014.  2.2.  Repeat CTA, 5/17, suggesting patent graft with nonobstructive disease otherwise.  2.3.  Carotid artery disease with 50 to 69% disease in the distal right internal carotid artery and distal left internal carotid artery per report, 4/2021.  2.4 carotid CTA 12/26/2023: High-grade stenosis 90% or greater bilateral external carotid arteries.  Fairly high-grade stenosis left vertebral artery 80%.  Internal carotid less than 50% bilaterally.  3.  Preserved systolic function.  4.  Hypertension.  5.  Dyslipidemia.       HPI  The patient is a 69-year-old female that returns for routine follow-up.  Since the patient's last visit she has had intermittent lower extremity edema.  She  does say this seems worse on days when the weather is hot.  The patient's blood pressure has also been elevated.  The patient states she has been under an increased amount of stress as her daughter  of a heart attack at the end of April.  She denies chest pain, worsening dyspnea, syncope, near syncope or significant palpitations.  She was referred to vascular surgery in [place] due to carotid stenosis.  At this time they are monitoring this with ultrasounds every 6 months.  The patient is currently taking Lasix 40 mg daily and on days that she has increased edema she has been taking an extra Lasix as well as an extra potassium.  The patient states that this is keeping the edema under relatively good control.  Her blood pressure is elevated in the office today.  She states it is typically not this elevated at home but it has been high for the past couple months.    PRIOR MEDS  Current Outpatient Medications on File Prior to Visit   Medication Sig Dispense Refill    aspirin 81 MG EC tablet Take 1 tablet by mouth Daily.      atorvastatin (LIPITOR) 80 MG tablet TAKE 1 TABLET BY MOUTH DAILY. 90 tablet 3    Biotin 1000 MCG tablet Take 1 tablet by mouth daily.      BLACK COHOSH PO Take 1,000 mg by mouth daily.      cloNIDine (CATAPRES) 0.1 MG tablet TAKE 1 TABLET BY MOUTH EVERY 12 (TWELVE) HOURS. 180 tablet 3    clopidogrel (PLAVIX) 75 MG tablet TAKE 1 TABLET BY MOUTH DAILY. 90 tablet 3    cyclobenzaprine (FLEXERIL) 10 MG tablet Take 1 tablet by mouth 3 (Three) Times a Day.      diazePAM (VALIUM) 2 MG tablet       fluticasone (FLONASE) 50 MCG/ACT nasal spray   3    Fluticasone-Umeclidin-Vilant (TRELEGY ELLIPTA IN) Inhale.      furosemide (LASIX) 20 MG tablet Take 1 tablet by mouth Daily. 90 tablet 3    gabapentin (NEURONTIN) 400 MG capsule Take 1 capsule by mouth 3 (Three) Times a Day.      hydroCHLOROthiazide (HYDRODIURIL) 12.5 MG tablet Take 1 tablet by mouth Every Other Day. Every other day 90 tablet 3     isosorbide mononitrate (IMDUR) 30 MG 24 hr tablet TAKE ONE TABLET BY MOUTH EVERY DAY 90 tablet 1    losartan (COZAAR) 100 MG tablet Take 1 tablet by mouth Daily. 90 tablet 1    nitroglycerin (NITROSTAT) 0.4 MG SL tablet Place 1 tablet under the tongue Every 5 (Five) Minutes As Needed for Chest Pain. Only take up to 3 tablets. Call 911 if pain persists. 25 tablet 5    O2 (OXYGEN) Inhale 2 L/min 1 (One) Time. Wears at night at 2 lpm via nc      oxyCODONE-acetaminophen (PERCOCET)  MG per tablet Take 1 tablet by mouth Every 8 (Eight) Hours As Needed for Moderate Pain.      pantoprazole (PROTONIX) 40 MG EC tablet TAKE ONE TABLET BY MOUTH EVERY DAY 90 tablet 11    potassium chloride (K-DUR,KLOR-CON) 20 MEQ CR tablet TAKE 1 TABLET BY MOUTH DAILY AS NEEDED (WITH LASIX). 90 tablet 3    traZODone (DESYREL) 50 MG tablet Take 1 tablet by mouth Every Night.      Xiidra 5 % ophthalmic solution Administer 1 drop to both eyes 2 (Two) Times a Day.      [DISCONTINUED] ezetimibe (ZETIA) 10 MG tablet TAKE ONE TABLET BY MOUTH DAILY (Patient not taking: Reported on 8/20/2024) 90 tablet 1    [DISCONTINUED] metoprolol tartrate (LOPRESSOR) 100 MG tablet Take 1 tablet by mouth 2 (Two) Times a Day. (Patient not taking: Reported on 8/20/2024) 180 tablet 3     No current facility-administered medications on file prior to visit.       ALLERGIES  Lisinopril, Adhesive tape, Ibuprofen, and Latex    HISTORY  Past Medical History:   Diagnosis Date    Anxiety     Aortic valve replaced     Arrhythmia     Back pain     Carotid bruit     Chest pain     COPD (chronic obstructive pulmonary disease)     COVID-19 vaccine administered 1st - 03/08/2021    2nd - 04/05/2021 - Moderna ( Booster 12/27/2021 ) Moderna     Dyslipidemia     Fainting     Functional murmur     GERD (gastroesophageal reflux disease)     Hypercholesterolemia     Hyperlipidemia     Hypertension     Hypokalemia     Orthostatic hypotension     Orthostatic hypotension     Peripheral  vascular disease     Peripheral vascular disease     PVD with history of aortobifemoral bypass    Shortness of breath     Sleep apnea        Social History     Socioeconomic History    Marital status:    Tobacco Use    Smoking status: Every Day     Current packs/day: 1.00     Average packs/day: 1 pack/day for 50.0 years (50.0 ttl pk-yrs)     Types: Cigarettes    Smokeless tobacco: Never    Tobacco comments:     Pt states she stopped smoking 3/14/19   Vaping Use    Vaping status: Never Used   Substance and Sexual Activity    Alcohol use: No    Drug use: Never    Sexual activity: Yes     Partners: Male     Birth control/protection: Post-menopausal, Hysterectomy       Family History   Problem Relation Age of Onset    Heart failure Mother         Congestive    Hypertension Mother     Heart failure Sister         Congestive    Coronary artery disease Sister     Heart attack Sister         Acute    Hypertension Sister     Sudden death Sister     Coronary artery disease Brother     Seizures Brother     Hypertension Brother     Heart failure Maternal Grandmother         Congestive       Review of Systems   Constitutional:  Positive for fatigue. Negative for chills and fever.   HENT: Negative.  Negative for congestion, rhinorrhea and sore throat.    Eyes:  Positive for visual disturbance.   Respiratory:  Positive for shortness of breath. Negative for chest tightness and wheezing.    Cardiovascular:  Positive for leg swelling. Negative for chest pain and palpitations.   Gastrointestinal: Negative.    Endocrine: Negative.    Genitourinary: Negative.    Musculoskeletal:  Positive for arthralgias, back pain and neck pain.   Skin: Negative.    Allergic/Immunologic: Positive for environmental allergies.   Neurological: Negative.  Negative for dizziness, weakness, numbness and headaches.   Hematological:  Bruises/bleeds easily.   Psychiatric/Behavioral:  Positive for sleep disturbance (oxygen 2L).        Objective  "    VITALS: /91   Pulse 83   Ht 156.2 cm (61.5\")   Wt 79.9 kg (176 lb 3.2 oz)   SpO2 95%   BMI 32.75 kg/m²     LABS:   Lab Results (most recent)       None            IMAGING:   No Images in the past 120 days found..    EXAM:  Physical Exam  Constitutional:       Appearance: Normal appearance.   Eyes:      Pupils: Pupils are equal, round, and reactive to light.   Cardiovascular:      Rate and Rhythm: Normal rate and regular rhythm.      Pulses:           Carotid pulses are 2+ on the right side with bruit and 2+ on the left side with bruit.       Radial pulses are 2+ on the right side and 2+ on the left side.        Dorsalis pedis pulses are 2+ on the right side and 2+ on the left side.        Posterior tibial pulses are 2+ on the right side and 2+ on the left side.      Heart sounds: Murmur heard.   Pulmonary:      Effort: Pulmonary effort is normal.      Breath sounds: Normal breath sounds.   Abdominal:      General: Bowel sounds are normal.      Palpations: Abdomen is soft.   Musculoskeletal:      Right lower leg: Mild edema.      Left lower leg: Mild edema.   Skin:     General: Skin is warm and dry.      Capillary Refill: Capillary refill takes less than 2 seconds.   Neurological:      General: No focal deficit present.      Mental Status: She is alert and oriented to person, place, and time.   Psychiatric:         Mood and Affect: Mood normal.         Thought Content: Thought content normal.   Procedure   Procedures       Assessment & Plan    Diagnosis Plan   1. Bilateral carotid artery stenosis        2. Essential hypertension        3. CAD in native artery        4. Primary hypertension          Plan:  1.  Bilateral carotid artery stenosis: Continue antiplatelet and statin.  She is following with vascular surgery in [place].  2.  Primary hypertension: The patient's blood pressure is elevated in the office today.  Will add carvedilol 6.25 mg p.o. twice daily for better blood pressure control.  " Continue losartan, isosorbide, Lasix.  Will avoid calcium channel blocker as she is already having some issues with intermittent edema.  The patient was instructed to monitor BP at home and to notify our office if systolic BP is consistently greater than 130-135 systolic.  Goal BP is 130-135/70-80.  Will continue to adjust antihypertensives as needed.    3.  CAD: No current chest pain symptoms no worsening shortness of air.  Will continue medical management including isosorbide, losartan, Zetia, atorvastatin, Plavix and aspirin.  Will add carvedilol.  She was advised to notify our office if symptoms of chest pain or worsening shortness of air develop.  The patient does have sublingual nitro to use if needed and we did review instructions on appropriate use and when to seek emergency treatment.  4.  PVD: No current claudication symptoms.  Will continue antiplatelet and statin.  Return in about 2 months (around 10/20/2024).    Barby Sue  reports that she has been smoking cigarettes. She has a 50 pack-year smoking history. She has never used smokeless tobacco. I have educated her on the risk of diseases from using tobacco products such as cancer, COPD, and heart disease.     I advised her to quit and she is not willing to quit.    I spent 3.5 minutes counseling the patient.           Advance Care Planning   ACP discussion was held with the patient during this visit. Patient does not have an advance directive, information provided.           MEDS ORDERED DURING VISIT:  New Medications Ordered This Visit   Medications    carvedilol (COREG) 6.25 MG tablet     Sig: Take 1 tablet by mouth 2 (Two) Times a Day.     Dispense:  180 tablet     Refill:  3       DISCONTINUED MEDS DURING VISIT:   Medications Discontinued During This Encounter   Medication Reason    metoprolol tartrate (LOPRESSOR) 100 MG tablet Patient Reported Not Taking    ezetimibe (ZETIA) 10 MG tablet Patient Reported Not Taking          This document has  been electronically signed by OSCAR Burgos  August 20, 2024 16:42 EDT    Dictated Utilizing Dragon Dictation: Part of this note may be an electronic transcription/translation of spoken language to printed text using the Dragon Dictation System

## 2024-08-28 ENCOUNTER — TELEPHONE (OUTPATIENT)
Dept: CARDIOLOGY | Facility: CLINIC | Age: 69
End: 2024-08-28
Payer: MEDICARE

## 2024-08-28 ENCOUNTER — TELEPHONE (OUTPATIENT)
Dept: CARDIOLOGY | Facility: CLINIC | Age: 69
End: 2024-08-28

## 2024-08-28 NOTE — TELEPHONE ENCOUNTER
----- Message from Megan ARGUETA sent at 8/27/2024  9:15 AM EDT -----    ----- Message -----  From: Chun Green PA  Sent: 8/25/2024   8:30 PM EDT  To: Shauna Clemente MA    Glucose minimally elevated at 104.  Chemistry profile otherwise benign.  Magnesium within normal limits.  ----- Message -----  From: Lab, Background User  Sent: 8/20/2024   6:56 PM EDT  To: TANMAY Lowery

## 2024-09-05 DIAGNOSIS — I73.9 PERIPHERAL VASCULAR DISEASE: ICD-10-CM

## 2024-09-05 DIAGNOSIS — E78.5 DYSLIPIDEMIA: ICD-10-CM

## 2024-09-05 DIAGNOSIS — I65.02 STENOSIS OF LEFT VERTEBRAL ARTERY: ICD-10-CM

## 2024-09-05 DIAGNOSIS — I10 PRIMARY HYPERTENSION: ICD-10-CM

## 2024-09-05 DIAGNOSIS — R60.0 PERIPHERAL EDEMA: ICD-10-CM

## 2024-09-05 DIAGNOSIS — I25.10 CAD IN NATIVE ARTERY: ICD-10-CM

## 2024-09-06 RX ORDER — CLONIDINE HYDROCHLORIDE 0.1 MG/1
0.1 TABLET ORAL EVERY 12 HOURS
Qty: 180 TABLET | Refills: 2 | Status: SHIPPED | OUTPATIENT
Start: 2024-09-06

## 2024-09-06 RX ORDER — POTASSIUM CHLORIDE 1500 MG/1
20 TABLET, EXTENDED RELEASE ORAL DAILY PRN
Qty: 90 TABLET | Refills: 2 | Status: SHIPPED | OUTPATIENT
Start: 2024-09-06

## 2024-09-06 RX ORDER — ATORVASTATIN CALCIUM 80 MG/1
80 TABLET, FILM COATED ORAL DAILY
Qty: 90 TABLET | Refills: 2 | Status: SHIPPED | OUTPATIENT
Start: 2024-09-06

## 2024-09-06 RX ORDER — CLOPIDOGREL BISULFATE 75 MG/1
75 TABLET ORAL DAILY
Qty: 90 TABLET | Refills: 2 | Status: SHIPPED | OUTPATIENT
Start: 2024-09-06

## 2024-10-04 DIAGNOSIS — I10 ESSENTIAL HYPERTENSION: ICD-10-CM

## 2024-10-07 RX ORDER — LOSARTAN POTASSIUM 100 MG/1
100 TABLET ORAL DAILY
Qty: 90 TABLET | Refills: 1 | Status: SHIPPED | OUTPATIENT
Start: 2024-10-07

## 2024-10-24 ENCOUNTER — HOSPITAL ENCOUNTER (OUTPATIENT)
Age: 69
Discharge: HOME | End: 2024-10-24
Payer: MEDICARE

## 2024-10-24 VITALS
DIASTOLIC BLOOD PRESSURE: 70 MMHG | SYSTOLIC BLOOD PRESSURE: 167 MMHG | HEART RATE: 87 BPM | RESPIRATION RATE: 18 BRPM | OXYGEN SATURATION: 98 %

## 2024-10-24 VITALS — BODY MASS INDEX: 33.2 KG/M2

## 2024-10-24 DIAGNOSIS — Z73.89: ICD-10-CM

## 2024-10-24 DIAGNOSIS — M25.551: ICD-10-CM

## 2024-10-24 DIAGNOSIS — M25.552: ICD-10-CM

## 2024-10-24 DIAGNOSIS — M54.50: Primary | ICD-10-CM

## 2024-10-24 DIAGNOSIS — Z79.02: ICD-10-CM

## 2024-10-24 PROCEDURE — G0463 HOSPITAL OUTPT CLINIC VISIT: HCPCS

## 2024-10-24 PROCEDURE — 99202 OFFICE O/P NEW SF 15 MIN: CPT

## 2024-10-24 NOTE — EXP.PAIN.OV
HPI
Data of Consult
Patient: new to practice
Consult date: 10/24/24
Requesting Physician: 
RAUL Crawford

Primary Care Provider: 
Referral Provider, MD

Consult Narrative
History of present illness: 
Ms. Camacho is a 69 year old female who presents today as a new patient.  She is a referral from Jaret Galaviz.  Today she rates her pain a 8 out of 10.  Patient states she has chronic low back pain that goes into her bilateral hips and down her left 
leg.  Patient states this been going on for years and progressively worsened.  Patient does state that initially she had an episode where she fell off of a footstool and this is what started her pains.  Patient states that she has been with her 
current pain management for about 10 years and they no longer take her insurance.  Patient does state that she is looking for someone to take over her medications.  Patient has never tried injections due to having a history of a AAA bypass and is on 
Plavix.  She states that her cardiologist does not want her to have any injections.  Patient states that her pain is constant and affects activities of daily living.  Patient states that she tries to be very active and does silviano and quilting and 
cannot function without her pain medications.  Patient does have a history of osteoporosis.  Patient does state that she was tried on other medications before starting on the oxycodone.  Patient states that Lortab made no improvement and that the 
Percocet ended up increasing her liver enzymes.Patient is currently managed with oxycodone 10 mg 3 times a day, diazepam 2 mg twice a day and gabapentin 600 mg 3 times a day from an outside provider.  Her Goyo has been reviewed.

CC: RAUL Crawford


[facility]
Disclaimer: 
The information contained in this section may have been updated after the patient was seen, as this information can be updated by other users.

Social History
Smoking Status:  Unknown if ever smoked 
alcohol intake:  never 
current occupational status:  other 
Travel in the last 8 weeks:  None 



Review of Systems
Review of Systems
Review of systems:: pertinent systems reviewed and negative unless documented below
Review of systems (narrative): 
Review of Systems:
General: No recent weight changes, no fever, no sleep disturbances
Respiratory: No cough, no shortness of air, no recurring pulmonary infections
Cardiovascular/peripheral vascular: No chest pain, no palpitations,  no edema, no shortness of breath
Gastrointestinal: No new onset incontinence, normal bowel movements reported
Genitourinary: No new onset incontinence
Musculoskeletal: [Low back pain, bilateral hip pain, left leg pain
Psychiatric: [Normal mood/affect]
Neurological: [Denies weakness in extremities], [denies balance issues]

Meds
Home Medications and Allergies
Home Medications

?Medication ?Instructions ?Recorded ?Confirmed ?Type
atorvastatin 80 mg tablet 80 mg PO DAILY 10/24/24 10/24/24 History
carvedilol 6.25 mg tablet 6.25 mg PO DAILY 10/24/24 10/24/24 History
clopidogrel 75 mg tablet 75 mg PO DAILY 10/24/24 10/24/24 History
cyclobenzaprine 10 mg tablet 10 mg PO TID 10/24/24 10/24/24 History
diazepam 2 mg tablet 2 mg PO BID 10/24/24 10/24/24 History
ezetimibe 10 mg tablet 10 mg PO DAILY 10/24/24 10/24/24 History
gabapentin 600 mg tablet 600 mg PO TID 10/24/24 10/24/24 History
oxycodone 10 mg tablet 10 mg PO TID 10/24/24 10/24/24 History
potassium chloride 20 mEq 20 meq PO AS DIRECTED 10/24/24 10/24/24 History
tablet,extended release(part/cryst)    

New Prescriptions to Start

Prescriptions:  


Allergies

Allergy/AdvReac Type Severity Reaction Status Date / Time
ibuprofen Allergy   Verified 10/24/24 10:38
latex Allergy   Verified 10/24/24 10:38
lisinopril AdvReac  Cough Verified 10/24/24 10:38



Objective
Narrative: 
Physical Exam:
General: Alert and oriented x3, no acute distress, pleasant and cooperative
Lungs: Respirations even and unlabored, symmetrical chest expansion
Eyes: PERRL
Musculoskeletal: Flexion and extension of lumbar [spine] somewhat guarded secondary to pain, [antalgic gait noted]
Neurological: Speech clear,  no gross sensory deficit

Assessment and Plan
*Assessment and plan
(1) Low back pain: 
      Status: Acute
      Category: Medical
      Code(s):
M54.50 - Low back pain, unspecified
(2) Hip pain, bilateral: 
      Status: Acute
      Category: Medical
      Code(s):
M25.551 - Pain in right hip; M25.552 - Pain in left hip

Plan
Patient was counseled that we are an interventional pain clinic and we do more injection therapy.  Patient was also counseled that we generally do not write any scheduled medications for new patients.  Patient does not have any updated imaging and 
states the most recent what have been a couple of years ago at her previous pain clinic.  I did  the patient that we will have to reach out to this facility and get a copy of the imaging and that there is no guarantee we would be able to do 
the same medications that she was on.  Patient was counseled that I will reach out to Dr. Sorto and review over with her specific case once I have imaging and that she can call at the end of the day for updated response.  Patient and daughter 
acknowledge understanding.  Patient has stated that her daughter does have approval to speak on her behalf or hear any information regarding her care.

We did reach out to the patient's prior provider to get a copy of their most recent imaging however that office would not send anything over until we had a release.  We have reached back out to the patient to see if they can contact this office and 
get either a copy of it or give verbal consent to send the imaging to our office.  I will will wait for a copy of this imaging before discussing with Dr. Sorto regarding taking over her medications.

Patient has been instructed to contact the clinic with any concerns before the next appointment.  Dr. Sorto has reviewed this note and agrees with this plan of care.  This note was dictated using voice recognition software and make contain errors or 
omissions.

All injections are used with Lidocaine or Bupivacaine and Depo Medrol.

## 2024-10-24 NOTE — A.OFFVIS_ITS
HPI    
Data of Consult    
Patient: new to practice    
Consult date: 10/24/24    
Requesting Physician:     
RAUL Crawford    
    
Primary Care Provider:     
Referral Provider, MD    
    
Consult Narrative    
History of present illness:     
Ms. Camacho is a 69 year old female who presents today as a new patient.  She is a  
referral from Jaret Galaviz.  Today she rates her pain a 8 out of 10.  Patient   
states she has chronic low back pain that goes into her bilateral hips and down   
her left leg.  Patient states this been going on for years and progressively   
worsened.  Patient does state that initially she had an episode where she fell   
off of a footstool and this is what started her pains.  Patient states that she   
has been with her current pain management for about 10 years and they no longer   
take her insurance.  Patient does state that she is looking for someone to take   
over her medications.  Patient has never tried injections due to having a   
history of a AAA bypass and is on Plavix.  She states that her cardiologist does  
not want her to have any injections.  Patient states that her pain is constant   
and affects activities of daily living.  Patient states that she tries to be   
very active and does silviano and quilting and cannot function without her pain   
medications.  Patient does have a history of osteoporosis.  Patient does state   
that she was tried on other medications before starting on the oxycodone.    
Patient states that Lortab made no improvement and that the Percocet ended up   
increasing her liver enzymes.Patient is currently managed with oxycodone 10 mg 3  
times a day, diazepam 2 mg twice a day and gabapentin 600 mg 3 times a day from   
an outside provider.  Her Goyo has been reviewed.    
    
CC: RAUL Crawford    
    
    
[facility]    
Disclaimer:     
The information contained in this section may have been updated after the   
patient was seen, as this information can be updated by other users.    
    
Social History    
Smoking Status:  Unknown if ever smoked     
alcohol intake:  never     
current occupational status:  other     
Travel in the last 8 weeks:  None     
    
    
    
Review of Systems    
Review of Systems    
Review of systems:: pertinent systems reviewed and negative unless documented   
below    
Review of systems (narrative):     
Review of Systems:    
General: No recent weight changes, no fever, no sleep disturbances    
Respiratory: No cough, no shortness of air, no recurring pulmonary infections    
Cardiovascular/peripheral vascular: No chest pain, no palpitations,  no edema,   
no shortness of breath    
Gastrointestinal: No new onset incontinence, normal bowel movements reported    
Genitourinary: No new onset incontinence    
Musculoskeletal: [Low back pain, bilateral hip pain, left leg pain    
Psychiatric: [Normal mood/affect]    
Neurological: [Denies weakness in extremities], [denies balance issues]    
    
Meds    
Home Medications and Allergies    
                                Home Medications    
    
    
    
?Medication ?Instructions ?Recorded ?Confirmed ?Type    
     
atorvastatin 80 mg tablet 80 mg PO DAILY 10/24/24 10/24/24 History    
     
carvedilol 6.25 mg tablet 6.25 mg PO DAILY 10/24/24 10/24/24 History    
     
clopidogrel 75 mg tablet 75 mg PO DAILY 10/24/24 10/24/24 History    
     
cyclobenzaprine 10 mg tablet 10 mg PO TID 10/24/24 10/24/24 History    
     
diazepam 2 mg tablet 2 mg PO BID 10/24/24 10/24/24 History    
     
ezetimibe 10 mg tablet 10 mg PO DAILY 10/24/24 10/24/24 History    
     
gabapentin 600 mg tablet 600 mg PO TID 10/24/24 10/24/24 History    
     
oxycodone 10 mg tablet 10 mg PO TID 10/24/24 10/24/24 History    
     
potassium chloride 20 mEq 20 meq PO AS DIRECTED 10/24/24 10/24/24 History    
    
tablet,extended release(part/cryst)        
    
    
                           New Prescriptions to Start    
    
Prescriptions:      
    
    
                                    Allergies    
    
    
    
Allergy/AdvReac Type Severity Reaction Status Date / Time    
     
ibuprofen Allergy   Verified 10/24/24 10:38    
     
latex Allergy   Verified 10/24/24 10:38    
     
lisinopril AdvReac  Cough Verified 10/24/24 10:38    
    
    
    
    
Objective    
Narrative:     
Physical Exam:    
General: Alert and oriented x3, no acute distress, pleasant and cooperative    
Lungs: Respirations even and unlabored, symmetrical chest expansion    
Eyes: PERRL    
Musculoskeletal: Flexion and extension of lumbar [spine] somewhat guarded   
secondary to pain, [antalgic gait noted]    
Neurological: Speech clear,  no gross sensory deficit    
    
Assessment and Plan    
*Assessment and plan    
(1) Low back pain:     
      Status: Acute    
      Category: Medical    
      Code(s):    
M54.50 - Low back pain, unspecified    
(2) Hip pain, bilateral:     
      Status: Acute    
      Category: Medical    
      Code(s):    
M25.551 - Pain in right hip; M25.552 - Pain in left hip    
    
Plan    
Patient was counseled that we are an interventional pain clinic and we do more   
injection therapy.  Patient was also counseled that we generally do not write   
any scheduled medications for new patients.  Patient does not have any updated   
imaging and states the most recent what have been a couple of years ago at her   
previous pain clinic.  I did  the patient that we will have to reach out   
to this facility and get a copy of the imaging and that there is no guarantee we  
would be able to do the same medications that she was on.  Patient was counseled  
that I will reach out to Dr. Sorto and review over with her specific case once I   
have imaging and that she can call at the end of the day for updated response.    
Patient and daughter acknowledge understanding.  Patient has stated that her   
daughter does have approval to speak on her behalf or hear any information   
regarding her care.    
    
We did reach out to the patient's prior provider to get a copy of their most   
recent imaging however that office would not send anything over until we had a   
release.  We have reached back out to the patient to see if they can contact   
this office and get either a copy of it or give verbal consent to send the i  
maging to our office.  I will will wait for a copy of this imaging before   
discussing with Dr. Sorto regarding taking over her medications.    
    
Patient has been instructed to contact the clinic with any concerns before the   
next appointment.  Dr. Sorto has reviewed this note and agrees with this plan of   
care.  This note was dictated using voice recognition software and make contain   
errors or omissions.    
    
All injections are used with Lidocaine or Bupivacaine and Depo Medrol.

## 2024-12-02 DIAGNOSIS — I65.23 BILATERAL CAROTID ARTERY STENOSIS: ICD-10-CM

## 2024-12-02 DIAGNOSIS — I65.02 STENOSIS OF LEFT VERTEBRAL ARTERY: ICD-10-CM

## 2024-12-02 DIAGNOSIS — E78.00 HYPERCHOLESTEROLEMIA: ICD-10-CM

## 2024-12-02 DIAGNOSIS — I25.10 CAD IN NATIVE ARTERY: ICD-10-CM

## 2024-12-02 RX ORDER — EZETIMIBE 10 MG/1
10 TABLET ORAL DAILY
Qty: 90 TABLET | Refills: 1 | OUTPATIENT
Start: 2024-12-02

## 2025-01-30 ENCOUNTER — TELEPHONE (OUTPATIENT)
Dept: CARDIOLOGY | Facility: CLINIC | Age: 70
End: 2025-01-30
Payer: MEDICARE

## 2025-01-30 ENCOUNTER — TELEPHONE (OUTPATIENT)
Dept: CARDIOLOGY | Facility: CLINIC | Age: 70
End: 2025-01-30

## 2025-01-30 NOTE — TELEPHONE ENCOUNTER
Received fax for clearance/surgery TODAY. The request states pt needs to hold Plavix x 1 week. Pt was last seen 8/20/24 and no showed appt for 11/5/24.       I called [facility], spoke w/ Patricia, I explained that pt hasn't been keeping her appt and that request states to hold Plavix x 1 week. She stated that pt came in yesterday for an appt and that surgery is needed ASAP due to displaced fracture , said they weren't sure a plate would hold it. I asked if pt has been holding Plavix, she stated she's unsure. She explained anesthesiology is requesting clearance. I explained that I understood the urgency, but was unsure if we could clear pt from a cardiac standpoint.

## 2025-01-30 NOTE — TELEPHONE ENCOUNTER
Received cardiac clearance request from DR GORAN SCHULTE stating pt has ORIF LEFT ANKLE POSSIBLE EXTERNAL FIXATOR scheduled for 01/30/2025 and is requiring a cardiac clearance. Placed cardiac clearance request in TENNILLE'S inbox to review and address with provider.

## 2025-01-30 NOTE — TELEPHONE ENCOUNTER
[facility] received a fax that requires your attention. The document has been indexed to the patient’s chart for your review.      Reason for sending: EXTERNAL MEDICAL RECORD NOTIFICATION     Documents Description: CARDIAC CLEARANCE REQ-[facility]-1.29.25    Name of Sender: [facility]     Date Indexed: 1.29.25

## 2025-01-30 NOTE — LETTER
January 30, 2025     [facility]    Patient: Barby Sue   YOB: 1955   Date of Visit: 1/30/2025     To Whom It May Concern:    We build our practice on integrity and patient care. The highest compliment we can receive is the referral of friends, family and patients to our office. We want to take this time to thank you for considering our group as part of your care team.    The medical records for Barby Sue 1955 were reviewed for pre-operative clearance on 1/30/25. It has been determined that this patient has:  Increased risk as patient has not had a stress test since 2021.    Barby Sue is currently on the following medications that will need to be held prior to surgery: Plavix should be held 7 days. She needs to continue taking Aspirin w/o stopping.     This pre-operative evaluation has been completed based on patient reported medical conditions at the date of this letter, this evaluation may have considered in part results of additional testing, completion of an EKG and patient reported symptoms at the time of their visit.    Barby Sue's pre-operative clearance is good for thirty(30) days from the date of this letter with no reported changes in health, symptoms or diagnosis from the patient, their primary care provider or your office.    Your office has reported the patient will under go ORIF left ankle, possible external fixator on 1/30/25 with Dr. Fernandes. If this appointment is changed or moved outside of thirty(30) days from 1/30/25 this pre-operative clearance is void.    If you have any further questions please give our office a call.    Thank you for your trust,      OSCAR Potts

## 2025-05-19 DIAGNOSIS — I10 ESSENTIAL HYPERTENSION: ICD-10-CM

## 2025-05-19 RX ORDER — LOSARTAN POTASSIUM 100 MG/1
100 TABLET ORAL DAILY
Qty: 90 TABLET | Refills: 1 | Status: SHIPPED | OUTPATIENT
Start: 2025-05-19

## 2025-07-18 DIAGNOSIS — I65.02 STENOSIS OF LEFT VERTEBRAL ARTERY: ICD-10-CM

## 2025-07-18 DIAGNOSIS — I10 PRIMARY HYPERTENSION: ICD-10-CM

## 2025-07-18 DIAGNOSIS — E78.5 DYSLIPIDEMIA: ICD-10-CM

## 2025-07-18 DIAGNOSIS — I73.9 PERIPHERAL VASCULAR DISEASE: ICD-10-CM

## 2025-07-18 DIAGNOSIS — I25.10 CAD IN NATIVE ARTERY: ICD-10-CM

## 2025-07-21 RX ORDER — ATORVASTATIN CALCIUM 80 MG/1
80 TABLET, FILM COATED ORAL DAILY
Qty: 90 TABLET | Refills: 2 | Status: SHIPPED | OUTPATIENT
Start: 2025-07-21

## 2025-07-21 RX ORDER — CLONIDINE HYDROCHLORIDE 0.1 MG/1
0.1 TABLET ORAL EVERY 12 HOURS
Qty: 180 TABLET | Refills: 2 | Status: SHIPPED | OUTPATIENT
Start: 2025-07-21

## 2025-07-21 RX ORDER — CLOPIDOGREL BISULFATE 75 MG/1
75 TABLET ORAL DAILY
Qty: 90 TABLET | Refills: 2 | Status: SHIPPED | OUTPATIENT
Start: 2025-07-21

## 2025-08-06 ENCOUNTER — OFFICE VISIT (OUTPATIENT)
Dept: CARDIOLOGY | Facility: CLINIC | Age: 70
End: 2025-08-06
Payer: MEDICARE

## 2025-08-06 VITALS
WEIGHT: 154.8 LBS | HEART RATE: 71 BPM | HEIGHT: 62 IN | BODY MASS INDEX: 28.49 KG/M2 | SYSTOLIC BLOOD PRESSURE: 179 MMHG | DIASTOLIC BLOOD PRESSURE: 88 MMHG | OXYGEN SATURATION: 97 %

## 2025-08-06 DIAGNOSIS — R01.1 CARDIAC MURMUR: ICD-10-CM

## 2025-08-06 DIAGNOSIS — I25.10 CAD IN NATIVE ARTERY: ICD-10-CM

## 2025-08-06 DIAGNOSIS — I10 ESSENTIAL HYPERTENSION: Primary | ICD-10-CM

## 2025-08-06 DIAGNOSIS — I65.23 BILATERAL CAROTID ARTERY STENOSIS: ICD-10-CM

## 2025-08-06 DIAGNOSIS — I73.9 PERIPHERAL VASCULAR DISEASE: ICD-10-CM

## 2025-08-06 PROCEDURE — 3079F DIAST BP 80-89 MM HG: CPT | Performed by: CLINICAL NURSE SPECIALIST

## 2025-08-06 PROCEDURE — 99214 OFFICE O/P EST MOD 30 MIN: CPT | Performed by: CLINICAL NURSE SPECIALIST

## 2025-08-06 PROCEDURE — 3077F SYST BP >= 140 MM HG: CPT | Performed by: CLINICAL NURSE SPECIALIST

## 2025-08-06 RX ORDER — AMLODIPINE BESYLATE 5 MG/1
5 TABLET ORAL DAILY
Qty: 30 TABLET | Refills: 6 | Status: SHIPPED | OUTPATIENT
Start: 2025-08-06